# Patient Record
Sex: MALE | Race: WHITE | NOT HISPANIC OR LATINO | Employment: FULL TIME | URBAN - METROPOLITAN AREA
[De-identification: names, ages, dates, MRNs, and addresses within clinical notes are randomized per-mention and may not be internally consistent; named-entity substitution may affect disease eponyms.]

---

## 2018-01-11 DIAGNOSIS — Z00.00 ANNUAL PHYSICAL EXAM: Primary | ICD-10-CM

## 2018-01-22 ENCOUNTER — LAB VISIT (OUTPATIENT)
Dept: LAB | Facility: HOSPITAL | Age: 53
End: 2018-01-22
Attending: INTERNAL MEDICINE
Payer: COMMERCIAL

## 2018-01-22 ENCOUNTER — CLINICAL SUPPORT (OUTPATIENT)
Dept: INTERNAL MEDICINE | Facility: CLINIC | Age: 53
End: 2018-01-22
Payer: COMMERCIAL

## 2018-01-22 ENCOUNTER — CLINICAL SUPPORT (OUTPATIENT)
Dept: CARDIOLOGY | Facility: CLINIC | Age: 53
End: 2018-01-22
Payer: COMMERCIAL

## 2018-01-22 ENCOUNTER — OFFICE VISIT (OUTPATIENT)
Dept: FAMILY MEDICINE | Facility: CLINIC | Age: 53
End: 2018-01-22
Payer: COMMERCIAL

## 2018-01-22 VITALS
TEMPERATURE: 98 F | SYSTOLIC BLOOD PRESSURE: 118 MMHG | DIASTOLIC BLOOD PRESSURE: 72 MMHG | OXYGEN SATURATION: 98 % | BODY MASS INDEX: 35.42 KG/M2 | RESPIRATION RATE: 18 BRPM | HEIGHT: 71 IN | WEIGHT: 253 LBS | HEART RATE: 84 BPM

## 2018-01-22 DIAGNOSIS — R74.01 ELEVATED ALT MEASUREMENT: ICD-10-CM

## 2018-01-22 DIAGNOSIS — Z00.00 ANNUAL PHYSICAL EXAM: Primary | ICD-10-CM

## 2018-01-22 DIAGNOSIS — Z00.00 ROUTINE PHYSICAL EXAMINATION: Primary | ICD-10-CM

## 2018-01-22 DIAGNOSIS — M25.512 ACUTE PAIN OF LEFT SHOULDER: ICD-10-CM

## 2018-01-22 DIAGNOSIS — Z12.11 SCREEN FOR COLON CANCER: ICD-10-CM

## 2018-01-22 DIAGNOSIS — Z00.00 ANNUAL PHYSICAL EXAM: ICD-10-CM

## 2018-01-22 LAB
ALBUMIN SERPL BCP-MCNC: 4 G/DL
ALP SERPL-CCNC: 72 U/L
ALT SERPL W/O P-5'-P-CCNC: 67 U/L
ANION GAP SERPL CALC-SCNC: 7 MMOL/L
AST SERPL-CCNC: 31 U/L
BILIRUB SERPL-MCNC: 0.4 MG/DL
BUN SERPL-MCNC: 15 MG/DL
CALCIUM SERPL-MCNC: 9.1 MG/DL
CHLORIDE SERPL-SCNC: 105 MMOL/L
CHOLEST SERPL-MCNC: 151 MG/DL
CHOLEST/HDLC SERPL: 4.1 {RATIO}
CO2 SERPL-SCNC: 25 MMOL/L
COMPLEXED PSA SERPL-MCNC: 1.2 NG/ML
CREAT SERPL-MCNC: 0.8 MG/DL
ERYTHROCYTE [DISTWIDTH] IN BLOOD BY AUTOMATED COUNT: 14 %
EST. GFR  (AFRICAN AMERICAN): >60 ML/MIN/1.73 M^2
EST. GFR  (NON AFRICAN AMERICAN): >60 ML/MIN/1.73 M^2
GGT SERPL-CCNC: 28 U/L
GLUCOSE SERPL-MCNC: 102 MG/DL
HCT VFR BLD AUTO: 43.1 %
HDLC SERPL-MCNC: 37 MG/DL
HDLC SERPL: 24.5 %
HGB BLD-MCNC: 14.9 G/DL
LDLC SERPL CALC-MCNC: 82.2 MG/DL
MCH RBC QN AUTO: 30.1 PG
MCHC RBC AUTO-ENTMCNC: 34.6 G/DL
MCV RBC AUTO: 87 FL
NONHDLC SERPL-MCNC: 114 MG/DL
PLATELET # BLD AUTO: 224 K/UL
PMV BLD AUTO: 11.6 FL
POTASSIUM SERPL-SCNC: 4.5 MMOL/L
PROT SERPL-MCNC: 7.3 G/DL
RBC # BLD AUTO: 4.95 M/UL
SODIUM SERPL-SCNC: 137 MMOL/L
TRIGL SERPL-MCNC: 159 MG/DL
WBC # BLD AUTO: 6.19 K/UL

## 2018-01-22 PROCEDURE — 82977 ASSAY OF GGT: CPT

## 2018-01-22 PROCEDURE — 99396 PREV VISIT EST AGE 40-64: CPT | Mod: S$GLB,,, | Performed by: INTERNAL MEDICINE

## 2018-01-22 PROCEDURE — 99999 PR PBB SHADOW E&M-EST. PATIENT-LVL IV: CPT | Mod: PBBFAC,,, | Performed by: INTERNAL MEDICINE

## 2018-01-22 PROCEDURE — 36415 COLL VENOUS BLD VENIPUNCTURE: CPT | Mod: PO

## 2018-01-22 PROCEDURE — 85027 COMPLETE CBC AUTOMATED: CPT | Mod: PO

## 2018-01-22 PROCEDURE — 84153 ASSAY OF PSA TOTAL: CPT

## 2018-01-22 PROCEDURE — 99999 PR PBB SHADOW E&M-EST. PATIENT-LVL I: CPT | Mod: PBBFAC,,,

## 2018-01-22 PROCEDURE — 80053 COMPREHEN METABOLIC PANEL: CPT | Mod: PO

## 2018-01-22 PROCEDURE — 93000 ELECTROCARDIOGRAM COMPLETE: CPT | Mod: S$GLB,,, | Performed by: INTERNAL MEDICINE

## 2018-01-22 PROCEDURE — 80061 LIPID PANEL: CPT

## 2018-01-22 NOTE — PROGRESS NOTES
January 22, 2018                                                                                                                                                                                                                                                                                      Lorenzo Cross  [address] [place] [address]                                                                                                                                                                                                                                                                                                RE: Lorenzo Cross                                                        Clinic #:[id number]                                                                                                                                   Dear  Lorenzo Cross,                                                                                                                                           Thank you for allowing me to serve you and perform your Executive Health exam on January 22, 2018.   This letter will serve a brief summary of the history, physical findings, and laboratory/studies performed and recommendations at that time.                                                                                         REASON FOR VISIT: Executive Health Preventive Physical Examination    History reviewed. No pertinent past medical history.    Past Surgical History:   Procedure Laterality Date    HAND SURGERY      LEG SURGERY      RT    THIGH BIOSPY         Family History   Problem Relation Age of Onset    Hypertension Mother     Heart disease Mother     Diabetes Mother     Heart disease Father     Cancer Father        Social History     Social History    Marital status:      Spouse name: YOBANI    Number of children: 4    Years of education: N/A     Occupational History     [employer]     Social History Main Topics    Smoking status: Former Smoker     Quit date: 3/9/2013    Smokeless tobacco: Former User    Alcohol use No    Drug use: No    Sexual activity: Not on file     Other Topics Concern    Not on file     Social History Narrative    No narrative on file       Allergies: Latex, natural rubber    No current outpatient prescriptions on file.     No current facility-administered medications for this visit.        REVIEW OF SYSTEMS:  No recent changes in weight, or complaints of fatigue. No recent changes in vision, or hearing. Denies frequent headaches.No recent changes in voice. No new or changing skin lesions. Denies abnormal bruising or bleeding.  Denies chest pain or sensation of skipped beats. No new onset of shortness of breath, or dyspnea on exertion. Denies abdominal discomfort, constipation, diarrhea,or blood in stool. Denies difficulty with urination.   No recent joint swelling or muscle discomfort. Denies pain or weakness in extremities. No recent loss of balance. Denies problems with sleep or depression.        Remainder of the review of systems without pertinent positives at this time.                                                                              PHYSICAL EXAM:   VITAL SIGNS: /72 (BP Location: Right arm, Patient Position: Sitting)   Pulse 84   Temp 98 °F (36.7 °C) (Oral)   Resp 18   Ht 5' 11" (1.803 m)   Wt 114.8 kg (253 lb)   SpO2 98%   BMI 35.29 kg/m²   GENERAL APPEARANCE:  Well nourished and normally developed,  pleasant 52 y.o. male, in good spirits.  SKIN: Without rashes or overt lesions.  HEENT: Head normacephalic. There was no scleral icterus. Mucous membranes were moist. Dentition. Neck is supple, no thyromegaly, or carotid bruits.  LUNGS: Clear to auscultation bilaterally. Normal respiratory effort.  HEART: Exam reveals regular rate and rhythm. First and second heart sounds normal. No murmurs, rubs or gallops.   ABDOMEN: " Soft, non-tender, non-distended. Exam reveals normal bowl sounds, no masses, no organomegaly and no aortic enlargement.    EXTREMITIES:  Non edematous, both femoral and pedal pulses are normal. No joint stiffness or tenderness. Full range of motion and strength, upper and lower bilaterally.    LAB DATA/STUDIES REVIEWED:  LABS:  Elevated liver enzyme (ALT)  EKG:  Normal      ASSESSMENT/RECOMMENDATIONS :    At this time,  you appear to be in good medical condition.    For your shoulder pain, we sent your to our sports medicine specialist.  For your history of mildly elevated liver enzyme, your work up was unrevealing.  That plus your liver ultrasound being normal in 2016, we can just monitor this.     I have reviewed your cholesterol profile, which is composed of your:   total cholesterol,   LDL- lousy cholesterol that causes plaques,  HDL - healthy cholesterol that removes LDL, and  Triglycerides - when elevated, a risk factor of heart disease and associated with high LDL levels)   Your triglycerides are mildly high.  Eating less processed foods(crackers, potato chips, and cookies) and/or less foods containing high amounts of simple sugars(candy, doughnuts, cakes, milk chocolate or sweets ) will lower your triglyceride levels.  Also, eating foods high in omega 3 fatty acids(fatty fish such as salmon) will help to lower your triglyceride levels.    Continue to work on regular exercise, maintenance of a healthy weight, balanced diet rich in fruits/vegetables and lean protein, and avoidance of unhealthy habits like smoking and excessive alcohol intake.  I look forward to seeing you again next year.    Please contact me should you have any questions or concerns regarding physical findings, or my recommendations.       Sincerely yours,          Clifford Sotelo M.D.  Department of Internal Medicine  [facility]     (0) independent

## 2018-01-30 ENCOUNTER — INITIAL CONSULT (OUTPATIENT)
Dept: PHYSICAL MEDICINE AND REHAB | Facility: CLINIC | Age: 53
End: 2018-01-30
Payer: COMMERCIAL

## 2018-01-30 VITALS
DIASTOLIC BLOOD PRESSURE: 91 MMHG | WEIGHT: 253 LBS | SYSTOLIC BLOOD PRESSURE: 150 MMHG | HEIGHT: 71 IN | HEART RATE: 94 BPM | BODY MASS INDEX: 35.42 KG/M2

## 2018-01-30 DIAGNOSIS — M75.122 COMPLETE RUPTURE OF LEFT ROTATOR CUFF: ICD-10-CM

## 2018-01-30 DIAGNOSIS — G89.29 CHRONIC LEFT SHOULDER PAIN: Primary | ICD-10-CM

## 2018-01-30 DIAGNOSIS — M25.512 CHRONIC LEFT SHOULDER PAIN: Primary | ICD-10-CM

## 2018-01-30 PROCEDURE — 99999 PR PBB SHADOW E&M-EST. PATIENT-LVL III: CPT | Mod: PBBFAC,,, | Performed by: PHYSICAL MEDICINE & REHABILITATION

## 2018-01-30 PROCEDURE — 76881 US COMPL JOINT R-T W/IMG: CPT | Mod: 59,S$GLB,, | Performed by: PHYSICAL MEDICINE & REHABILITATION

## 2018-01-30 PROCEDURE — 99244 OFF/OP CNSLTJ NEW/EST MOD 40: CPT | Mod: 25,S$GLB,, | Performed by: PHYSICAL MEDICINE & REHABILITATION

## 2018-01-30 PROCEDURE — 20611 DRAIN/INJ JOINT/BURSA W/US: CPT | Mod: LT,S$GLB,, | Performed by: PHYSICAL MEDICINE & REHABILITATION

## 2018-01-30 RX ORDER — BETAMETHASONE SODIUM PHOSPHATE AND BETAMETHASONE ACETATE 3; 3 MG/ML; MG/ML
6 INJECTION, SUSPENSION INTRA-ARTICULAR; INTRALESIONAL; INTRAMUSCULAR; SOFT TISSUE
Status: DISCONTINUED | OUTPATIENT
Start: 2018-01-30 | End: 2018-01-30 | Stop reason: HOSPADM

## 2018-01-30 RX ADMIN — BETAMETHASONE SODIUM PHOSPHATE AND BETAMETHASONE ACETATE 6 MG: 3; 3 INJECTION, SUSPENSION INTRA-ARTICULAR; INTRALESIONAL; INTRAMUSCULAR; SOFT TISSUE at 10:01

## 2018-01-30 NOTE — PROCEDURES
Large Joint Aspiration/Injection  Date/Time: 1/30/2018 10:47 AM  Performed by: TATUM SIDHU  Authorized by: TATUM SIDHU     Consent Done?:  Yes (Verbal)  Indications:  Pain  Procedure site marked: Yes    Timeout: Prior to procedure the correct patient, procedure, and site was verified      Location:  Shoulder  Site:  L subacromial bursa  Prep: Patient was prepped and draped in usual sterile fashion    Ultrasonic Guidance for needle placement: Yes  Images are saved and documented.  Needle size:  25 G  Approach: Needle in plane, lateral to medial.  Medications:  6 mg betamethasone acetate-betamethasone sodium phosphate 6 mg/mL  Patient tolerance:  Patient tolerated the procedure well with no immediate complications    Additional Comments: Ultrasound guidance was used for correct needle placement, the images were saved will be uploaded to EMR.

## 2018-01-30 NOTE — LETTER
January 30, 2018      Clifford Sotelo MD  [address] [facility] [address] [place] [address]           [facility]  [address] [facility] [address] [place] [address]  Phone: [phone]  Fax: [phone]          Patient: Lorenzo Cross   MR Number: [id number]   YOB: 1965   Date of Visit: 1/30/2018       Dear Dr. Clifford Sotelo:    Thank you for referring Lorenzo Cross to me for evaluation. Attached you will find relevant portions of my assessment and plan of care.    If you have questions, please do not hesitate to call me. I look forward to following Lorenzo Cross along with you.    Sincerely,    Usama Moore MD    Enclosure  CC:  No Recipients    If you would like to receive this communication electronically, please contact [email or web address] or [phone] to request more information on [employer] access.    For providers and/or their staff who would like to refer a patient to [facility], please contact us through our one-stop-shop provider referral line, [facility], at [phone].    If you feel you have received this communication in error or would no longer like to receive these types of communications, please e-mail [email or web address]

## 2018-01-30 NOTE — PROGRESS NOTES
[facility]    Consulting Provider: Clifford Sotelo MD    CHIEF COMPLAINT:   Chief Complaint   Patient presents with    Shoulder Pain     left shoulder pain     HISTORY OF PRESENT ILLNESS: Lorenzo Cross is a 52 y.o. right-handed male who presents to me for the first time for evaluation of left shoulder pain. He reports pain for several years which came on gradually without injury/trauma. He was recently reaching for something with his left arm when he felt popping and worsening left shoulder pain. He localizes pain to anterior left shoulder. He denies numbness/tingling. Pain is exacerbated with overhead activities and lying on left side at night. Pain is relieved with physical therapy, massage and rest. He attended physical therapy a few years ago which provided some relief. He has never received shoulder injections in the past.  He rates his pain as a 5 on a scale of 1-10.  He expresses some popping of the shoulder.  He denies any numbness or tingling of the left upper.    Review of Systems   Constitutional: Negative for fever.   HENT: Negative for drooling.    Eyes: Negative for discharge.   Respiratory: Negative for choking.    Cardiovascular: Negative for chest pain.   Genitourinary: Negative for flank pain.   Skin: Negative for wound.   Allergic/Immunologic: Negative for immunocompromised state.   Neurological: Negative for tremors and syncope.   Psychiatric/Behavioral: Negative for behavioral problems.     Past Medical History: History reviewed. No pertinent past medical history.    Past Surgical History:   Past Surgical History:   Procedure Laterality Date    HAND SURGERY      LEG SURGERY      RT    THIGH BIOSPY         Family History:   Family History   Problem Relation Age of Onset    Hypertension Mother     Heart disease Mother     Diabetes Mother     Heart disease Father     Cancer Father        Medications:   No current outpatient prescriptions on file prior to visit.  "    No current facility-administered medications on file prior to visit.        Allergies:   Review of patient's allergies indicates:   Allergen Reactions    Latex, natural rubber Rash     With prolonged exposure, he will develop rash - raised, red, itchy       Social History:   Social History     Social History    Marital status:      Spouse name: YOBANI    Number of children: 4    Years of education: N/A     Occupational History     [employer]     Social History Main Topics    Smoking status: Former Smoker     Quit date: 3/9/2013    Smokeless tobacco: Former User    Alcohol use No    Drug use: No    Sexual activity: Not Asked     Other Topics Concern    None     Social History Narrative    None     PHYSICAL EXAMINATION:   General    Vitals:    01/30/18 1005   BP: (!) 150/91   Pulse: 94   Weight: 114.8 kg (253 lb)   Height: 5' 11" (1.803 m)     Constitutional: Oriented to person, place, and time. No apparent distress. Appears well-developed and well-nourished. Pleasant.  HENT:   Head: Normocephalic and atraumatic.   Eyes: Right eye exhibits no discharge. Left eye exhibits no discharge. No scleral icterus.   Pulmonary/Chest: Effort normal. No respiratory distress.   Abdominal: There is no guarding.   Neurological: Alert and oriented to person, place, and time.   Psychiatric: Behavior is normal.     Right Shoulder Exam     Tenderness   The patient is experiencing no tenderness.        Range of Motion   Active Abduction: normal   Passive Abduction: normal   Extension: normal   Forward Flexion: normal   External Rotation: normal     Muscle Strength   Abduction: 5/5   Internal Rotation: 5/5   External Rotation: 5/5   Supraspinatus: 5/5   Subscapularis: 5/5   Biceps: 5/5     Tests   Apprehension: negative  Cross Arm: negative  Drop Arm: negative  Hawkin's test: negative  Impingement: negative  Sulcus: absent    Other   Erythema: absent  Scars: absent  Sensation: normal  Pulse: " present      Left Shoulder Exam     Tenderness   Left shoulder tenderness location: greater tuberosity.    Range of Motion   Active Abduction: 170   Passive Abduction: 170   Extension: 60   External Rotation: 90     Muscle Strength   Abduction: 5/5   Internal Rotation: 5/5   External Rotation: 5/5   Supraspinatus: 4/5   Subscapularis: 5/5   Biceps: 5/5     Tests   Apprehension: negative  Cross Arm: negative  Drop Arm: negative  Hawkin's test: positive  Impingement: positive  Sulcus: absent    Other   Erythema: absent  Scars: absent  Sensation: normal  Pulse: present     Comments:  +Empty can        INSPECTION: There is no swelling, ecchymoses, erythema or gross deformity about the left shoulder.    Imaging  X-ray left shoulder (9/18/16) - There is hypertrophic degenerative change of the left acromioclavicular joint.  No acute fracture, dislocation, or osseous destructive process appreciated radiographically.  No accessory os acromiale ossicle.  There is mild degenerative change of the left shoulder joint.  No rotator cuff calcific tendinitis.  The visualized left chest is clear.    Diagnostic Ultrasound Exam:  FINDINGS: Real time examination was performed. Selected static and dynamic images were obtained, and correlated with prior x-ray and other available imaging.     The left shoulder was examined and findings were as follows: the long head of the biceps tendon was observed to have a normal fibular appearance and was positioned appropriately in the bicipital groove. The biceps tendon did not reveal subluxation on dynamic imaging. There is no visible evidence for coracohumeral impingement. The acromioclavicular joint has some cortical irregularity as well as hypoechoic swelling. There is no evidence for abnormal translation of the acromioclavicular joint on cross-body adduction.     The rotator cuff was examined in detail. The subscapularis is heterogenous in echotexture.  There was a moderate hypoechoic fluid  "collection within the subacromial bursa. The supraspinatus was abnormal with a full-thickness tear at the distal anterior portion of the tendon. There was cortical irregularity of the greater tuberosity present. The infraspinatus was heterogenous in internal echotexture consistent with tendonopathy. The teres minor was not assessed. The subacromial/subdeltoid bursa was thickened and inflated with a moderate degree of hypoechoic fluid.     There does not appear to be any effusion within the posterior glenohumeral recess. The posterosuperior glenoid labrum is normal. The spinoglenoid notch is normal, without a suprascapular ganglion cyst.    Data Reviewed: X-ray, ultrasound    Supportive Actions: Independent visualization of images or test specimens    ASSESSMENT:   1. Chronic left shoulder pain      PLAN:     1. Time was spent reviewing the above diagnosis in depth with Lorenzo hughes, including acute management and rehabilitation.     2.  He has signs, symptoms, and diagnostic ultrasound exam findings all consistent with diagnosis of a full-thickness left rotator cuff tear.  We discussed that surgical revision is the only means for complete repair.  He feels that he is not ready to undergo such procedure and wishes to explore more conservative options.  We discussed injection under ultrasound of corticosteroid to the left shoulder.  He does wish to proceed with this option, see separate procedure note.    3.  I recommended we combine the injection with formal physical therapy.  He should focus on rotator cuff and scapular stabilization and transition to a home exercise program.     4. RTC in 6 weeks if not improved.    This is a consult from Dr. Clifford Sotelo. Please see the "Communications" section of Epic to see how the consulting physician received the report of today's findings and recommendations. If it's an [facility] physician, it will be forwarded to his/her "in basket".    The above note was completed, in part, " with the aid of Dragon dictation software/hardware. Translation errors may be present.

## 2018-04-18 ENCOUNTER — TELEPHONE (OUTPATIENT)
Dept: ENDOSCOPY | Facility: HOSPITAL | Age: 53
End: 2018-04-18

## 2018-04-18 NOTE — TELEPHONE ENCOUNTER
Mr. Cross needs to reschedule please. He wasn't aware his colonoscopy was scheduled for Friday and can't take off of work short notice. Thank-you.

## 2018-04-19 NOTE — TELEPHONE ENCOUNTER
Called pt to reschedule colonoscopy  Pt states he is going to have surgery on his shoulder     Advised pt to call after his shoulder surgery.  When he is able to lay on his shoulder

## 2019-07-03 ENCOUNTER — TELEPHONE (OUTPATIENT)
Dept: FAMILY MEDICINE | Facility: CLINIC | Age: 54
End: 2019-07-03

## 2019-07-03 NOTE — TELEPHONE ENCOUNTER
Called patient from call list  To schedule apt     No answer left message to return call to schedule annual apt.

## 2019-07-03 NOTE — TELEPHONE ENCOUNTER
----- Message from Linda Hook sent at 7/3/2019  3:13 PM CDT -----  Contact: self  Type:  Patient Returning Call    Who Called:  self  Who Left Message for Patient:  Aileen  Does the patient know what this is regarding?:  no  Best Call Back Number:  [phone] (home)   Additional Information:  Patient received a call but is not sure what it is regarding. Thanks!

## 2019-07-24 ENCOUNTER — PATIENT OUTREACH (OUTPATIENT)
Dept: ADMINISTRATIVE | Facility: HOSPITAL | Age: 54
End: 2019-07-24

## 2019-07-24 NOTE — LETTER
July 24, 2019    Lorenzo Cross  [address] [place] [address]             [facility]  [address]  [facility] [address]  Phone: [phone] Dear Mr. Cross:    [facility] is committed to your overall health.  To help you get the most out of each of your visits, we will review your information to make sure you are up to date on all of your recommended tests and/or procedures.      Clifford Sotelo MD  has found that your chart shows you may be due for the following:    Health Maintenance Due   Topic    Colonoscopy        If you have had any of the above done at another facility, please bring the records with you or FAX them to [phone] so that your record at [facility] will be complete.  If you have not had any of these tests or procedures done recently and would like to complete this testing, please call [phone] or send a message through your [facility] portal to your provider's office.    If you have an upcoming scheduled appointment for the above test and/or procedures, please disregard this letter.        If you have any questions or concerns, please don't hesitate to call.    Sincerely,    Marisabel VALENCIA, Panel Care Coordinator, -[phone]   [facility]  [address] [place] [address] [phone] (f)

## 2019-08-07 ENCOUNTER — OFFICE VISIT (OUTPATIENT)
Dept: FAMILY MEDICINE | Facility: CLINIC | Age: 54
End: 2019-08-07
Payer: COMMERCIAL

## 2019-08-07 VITALS
DIASTOLIC BLOOD PRESSURE: 84 MMHG | HEIGHT: 71 IN | WEIGHT: 259.69 LBS | BODY MASS INDEX: 36.35 KG/M2 | HEART RATE: 90 BPM | SYSTOLIC BLOOD PRESSURE: 130 MMHG | OXYGEN SATURATION: 96 %

## 2019-08-07 DIAGNOSIS — Z00.00 ROUTINE PHYSICAL EXAMINATION: Primary | ICD-10-CM

## 2019-08-07 DIAGNOSIS — E66.9 OBESITY (BMI 35.0-39.9 WITHOUT COMORBIDITY): ICD-10-CM

## 2019-08-07 DIAGNOSIS — Z00.00 ANNUAL PHYSICAL EXAM: Primary | ICD-10-CM

## 2019-08-07 PROCEDURE — 99999 PR PBB SHADOW E&M-EST. PATIENT-LVL III: ICD-10-PCS | Mod: PBBFAC,,, | Performed by: INTERNAL MEDICINE

## 2019-08-07 PROCEDURE — 99999 PR PBB SHADOW E&M-EST. PATIENT-LVL III: CPT | Mod: PBBFAC,,, | Performed by: INTERNAL MEDICINE

## 2019-08-07 PROCEDURE — 99396 PREV VISIT EST AGE 40-64: CPT | Mod: S$GLB,,, | Performed by: INTERNAL MEDICINE

## 2019-08-07 PROCEDURE — 99396 PR PREVENTIVE VISIT,EST,40-64: ICD-10-PCS | Mod: S$GLB,,, | Performed by: INTERNAL MEDICINE

## 2019-08-07 RX ORDER — PHENTERMINE HYDROCHLORIDE 37.5 MG/1
37.5 TABLET ORAL
Qty: 30 TABLET | Refills: 0 | Status: SHIPPED | OUTPATIENT
Start: 2019-08-07 | End: 2019-09-03 | Stop reason: SDUPTHER

## 2019-08-07 NOTE — PROGRESS NOTES
August 7, 2019                                                                                                                                                                                                                                                                                      Lorenzo Cross  [address] [place] [address]                                                                                                                                                                                                                                                                                                RE: Lorenzo Cross                                                        Clinic #:[id number]                                                                                                                                   Dear  Lorenzo Cross,                                                                                                                                           Thank you for allowing me to serve you and perform your Executive Health exam on August 7, 2019.   This letter will serve a brief summary of the history, physical findings, and laboratory/studies performed and recommendations at that time.                                                                                         REASON FOR VISIT: Executive Health Preventive Physical Examination    History reviewed. No pertinent past medical history.    Past Surgical History:   Procedure Laterality Date    HAND SURGERY      LEG SURGERY      RT    THIGH BIOSPY         Family History   Problem Relation Age of Onset    Hypertension Mother     Heart disease Mother     Diabetes Mother     Heart disease Father     Cancer Father        Social History     Socioeconomic History    Marital status:      Spouse name: YOBANI    Number of children: 4    Years of education: Not on file    Highest education level:  Not on file   Occupational History     Employer: [employer]   Social Needs    Financial resource strain: Not on file    Food insecurity:     Worry: Not on file     Inability: Not on file    Transportation needs:     Medical: Not on file     Non-medical: Not on file   Tobacco Use    Smoking status: Former Smoker     Last attempt to quit: 3/9/2013     Years since quittin.4    Smokeless tobacco: Former User   Substance and Sexual Activity    Alcohol use: No    Drug use: No    Sexual activity: Not on file   Lifestyle    Physical activity:     Days per week: Not on file     Minutes per session: Not on file    Stress: Not on file   Relationships    Social connections:     Talks on phone: Not on file     Gets together: Not on file     Attends Mandaeism service: Not on file     Active member of club or organization: Not on file     Attends meetings of clubs or organizations: Not on file     Relationship status: Not on file   Other Topics Concern    Not on file   Social History Narrative    Not on file       Allergies: Latex, natural rubber    Current Outpatient Medications   Medication Sig Dispense Refill    phentermine (ADIPEX-P) 37.5 mg tablet Take 1 tablet (37.5 mg total) by mouth before breakfast. 30 tablet 0     No current facility-administered medications for this visit.        REVIEW OF SYSTEMS:  No recent changes in weight, or complaints of fatigue. No recent changes in vision, or hearing. Denies frequent headaches.No recent changes in voice. No new or changing skin lesions. Denies abnormal bruising or bleeding.  Denies chest pain or sensation of skipped beats. No new onset of shortness of breath, or dyspnea on exertion. Denies abdominal discomfort, constipation, diarrhea,or blood in stool. Denies difficulty with urination.   No recent joint swelling or muscle discomfort. Denies pain or weakness in extremities. No recent loss of balance. Denies problems with sleep or depression.       "  Remainder of the review of systems without pertinent positives at this time.                                                                              PHYSICAL EXAM:   VITAL SIGNS: /84 (BP Location: Left arm, Patient Position: Sitting, BP Method: Medium (Manual))   Pulse 90   Ht 5' 11" (1.803 m)   Wt 117.8 kg (259 lb 11.2 oz)   SpO2 96%   BMI 36.22 kg/m²   GENERAL APPEARANCE:  Well nourished and normally developed,  pleasant 54 y.o. male, in good spirits.  SKIN: Without rashes or overt lesions.  HEENT: Head normacephalic. There was no scleral icterus. Mucous membranes were moist. Dentition. Neck is supple, no thyromegaly, or carotid bruits.  LUNGS: Clear to auscultation bilaterally. Normal respiratory effort.  HEART: Exam reveals regular rate and rhythm. First and second heart sounds normal. No murmurs, rubs or gallops.   ABDOMEN: Soft, non-tender, non-distended. Exam reveals normal bowl sounds, no masses, no organomegaly and no aortic enlargement.    EXTREMITIES:  Non edematous, both femoral and pedal pulses are normal. No joint stiffness or tenderness. Full range of motion and strength, upper and lower bilaterally.    LAB DATA/STUDIES REVIEWED:      ASSESSMENT/RECOMMENDATIONS :    At this time,  you appear to be in good medical condition.    For your weight, we are going to try Adipex   Continue to work on regular exercise, maintenance of a healthy weight, balanced diet rich in fruits/vegetables and lean protein, and avoidance of unhealthy habits like smoking and excessive alcohol intake.  I look forward to seeing you again next year.    Please contact me should you have any questions or concerns regarding physical findings, or my recommendations.       Sincerely yours,          Clifford Sotelo M.D.  Department of Internal Medicine  [facility]    "

## 2019-08-23 ENCOUNTER — CLINICAL SUPPORT (OUTPATIENT)
Dept: CARDIOLOGY | Facility: CLINIC | Age: 54
End: 2019-08-23
Payer: COMMERCIAL

## 2019-08-23 ENCOUNTER — CLINICAL SUPPORT (OUTPATIENT)
Dept: INTERNAL MEDICINE | Facility: CLINIC | Age: 54
End: 2019-08-23
Payer: COMMERCIAL

## 2019-08-23 DIAGNOSIS — Z00.00 ANNUAL PHYSICAL EXAM: ICD-10-CM

## 2019-08-23 DIAGNOSIS — Z00.00 ANNUAL PHYSICAL EXAM: Primary | ICD-10-CM

## 2019-08-23 LAB
ALBUMIN SERPL BCP-MCNC: 4.3 G/DL (ref 3.5–5.2)
ALP SERPL-CCNC: 88 U/L (ref 55–135)
ALT SERPL W/O P-5'-P-CCNC: 48 U/L (ref 10–44)
ANION GAP SERPL CALC-SCNC: 9 MMOL/L (ref 8–16)
AST SERPL-CCNC: 32 U/L (ref 10–40)
BILIRUB SERPL-MCNC: 0.5 MG/DL (ref 0.1–1)
BUN SERPL-MCNC: 18 MG/DL (ref 6–20)
CALCIUM SERPL-MCNC: 9.4 MG/DL (ref 8.7–10.5)
CHLORIDE SERPL-SCNC: 103 MMOL/L (ref 95–110)
CHOLEST SERPL-MCNC: 139 MG/DL (ref 120–199)
CHOLEST/HDLC SERPL: 4.1 {RATIO} (ref 2–5)
CO2 SERPL-SCNC: 26 MMOL/L (ref 23–29)
COMPLEXED PSA SERPL-MCNC: 1.7 NG/ML (ref 0–4)
CREAT SERPL-MCNC: 0.9 MG/DL (ref 0.5–1.4)
ERYTHROCYTE [DISTWIDTH] IN BLOOD BY AUTOMATED COUNT: 13.2 % (ref 11.5–14.5)
EST. GFR  (AFRICAN AMERICAN): >60 ML/MIN/1.73 M^2
EST. GFR  (NON AFRICAN AMERICAN): >60 ML/MIN/1.73 M^2
GLUCOSE SERPL-MCNC: 83 MG/DL (ref 70–110)
HCT VFR BLD AUTO: 45.4 % (ref 40–54)
HDLC SERPL-MCNC: 34 MG/DL (ref 40–75)
HDLC SERPL: 24.5 % (ref 20–50)
HGB BLD-MCNC: 15 G/DL (ref 14–18)
LDLC SERPL CALC-MCNC: 83.4 MG/DL (ref 63–159)
MCH RBC QN AUTO: 29.6 PG (ref 27–31)
MCHC RBC AUTO-ENTMCNC: 33 G/DL (ref 32–36)
MCV RBC AUTO: 90 FL (ref 82–98)
NONHDLC SERPL-MCNC: 105 MG/DL
PLATELET # BLD AUTO: 211 K/UL (ref 150–350)
PMV BLD AUTO: 12.5 FL (ref 9.2–12.9)
POTASSIUM SERPL-SCNC: 4.2 MMOL/L (ref 3.5–5.1)
PROT SERPL-MCNC: 7.7 G/DL (ref 6–8.4)
RBC # BLD AUTO: 5.07 M/UL (ref 4.6–6.2)
SODIUM SERPL-SCNC: 138 MMOL/L (ref 136–145)
TRIGL SERPL-MCNC: 108 MG/DL (ref 30–150)
WBC # BLD AUTO: 5.98 K/UL (ref 3.9–12.7)

## 2019-08-23 PROCEDURE — 93005 ELECTROCARDIOGRAM TRACING: CPT | Mod: PBBFAC,PO | Performed by: INTERNAL MEDICINE

## 2019-08-23 PROCEDURE — 84153 ASSAY OF PSA TOTAL: CPT

## 2019-08-23 PROCEDURE — 80053 COMPREHEN METABOLIC PANEL: CPT

## 2019-08-23 PROCEDURE — 80061 LIPID PANEL: CPT

## 2019-08-23 PROCEDURE — 85027 COMPLETE CBC AUTOMATED: CPT

## 2019-08-23 PROCEDURE — 93010 EKG 12-LEAD: ICD-10-PCS | Mod: S$PBB,,, | Performed by: INTERNAL MEDICINE

## 2019-08-23 PROCEDURE — 93010 ELECTROCARDIOGRAM REPORT: CPT | Mod: S$PBB,,, | Performed by: INTERNAL MEDICINE

## 2019-09-03 ENCOUNTER — OFFICE VISIT (OUTPATIENT)
Dept: FAMILY MEDICINE | Facility: CLINIC | Age: 54
End: 2019-09-03
Payer: COMMERCIAL

## 2019-09-03 VITALS
SYSTOLIC BLOOD PRESSURE: 126 MMHG | OXYGEN SATURATION: 95 % | WEIGHT: 251.75 LBS | HEIGHT: 71 IN | HEART RATE: 87 BPM | DIASTOLIC BLOOD PRESSURE: 82 MMHG | BODY MASS INDEX: 35.24 KG/M2

## 2019-09-03 DIAGNOSIS — E66.9 OBESITY (BMI 30.0-34.9): ICD-10-CM

## 2019-09-03 DIAGNOSIS — M76.892 LEFT KNEE TENDONITIS: Primary | ICD-10-CM

## 2019-09-03 DIAGNOSIS — E66.9 OBESITY (BMI 35.0-39.9 WITHOUT COMORBIDITY): ICD-10-CM

## 2019-09-03 DIAGNOSIS — R74.01 ELEVATED ALT MEASUREMENT: ICD-10-CM

## 2019-09-03 PROCEDURE — 99214 OFFICE O/P EST MOD 30 MIN: CPT | Mod: S$GLB,,, | Performed by: INTERNAL MEDICINE

## 2019-09-03 PROCEDURE — 99999 PR PBB SHADOW E&M-EST. PATIENT-LVL III: CPT | Mod: PBBFAC,,, | Performed by: INTERNAL MEDICINE

## 2019-09-03 PROCEDURE — 99999 PR PBB SHADOW E&M-EST. PATIENT-LVL III: ICD-10-PCS | Mod: PBBFAC,,, | Performed by: INTERNAL MEDICINE

## 2019-09-03 PROCEDURE — 99214 PR OFFICE/OUTPT VISIT, EST, LEVL IV, 30-39 MIN: ICD-10-PCS | Mod: S$GLB,,, | Performed by: INTERNAL MEDICINE

## 2019-09-03 RX ORDER — PHENTERMINE HYDROCHLORIDE 37.5 MG/1
37.5 TABLET ORAL
Qty: 30 TABLET | Refills: 0 | Status: SHIPPED | OUTPATIENT
Start: 2019-09-03 | End: 2019-10-03

## 2019-09-03 NOTE — PROGRESS NOTES
Subjective:       Patient ID: Lorenzo Cross is a 54 y.o. male.    Chief Complaint: Results    Complains of right knee pain for 1 month.  Worse after work.  Ice and otc tx help  Obesity - lost 8 lb w Adipex and has life style   Elevated ALT - mild persistant.  Neg ultrasound 2014.  Neg hepatitis panel 2013.     Review of Systems   Constitutional: Negative for appetite change and fever.   HENT: Negative for nosebleeds and trouble swallowing.    Eyes: Negative for discharge and visual disturbance.   Respiratory: Negative for choking and shortness of breath.    Cardiovascular: Negative for chest pain and palpitations.   Gastrointestinal: Negative for abdominal pain, nausea and vomiting.   Musculoskeletal: Positive for arthralgias. Negative for joint swelling.   Skin: Negative for rash and wound.   Neurological: Negative for dizziness and syncope.   Psychiatric/Behavioral: Negative for confusion and dysphoric mood.       Objective:      Vitals:    09/03/19 1733   BP: 126/82   Pulse: 87     Physical Exam   Constitutional: He appears well-nourished.   Eyes: Conjunctivae and EOM are normal.   Neck: Normal range of motion.   Cardiovascular: Normal rate and regular rhythm.   Pulmonary/Chest: Effort normal and breath sounds normal.   Musculoskeletal:   Normal ROM bilateral   Left posterior knee tendon TTP    Neurological: No cranial nerve deficit (grossly intact).   Skin: Skin is warm and dry.   Psychiatric: He has a normal mood and affect.   Alert and orientated   Vitals reviewed.        Assessment:       1. Left knee tendonitis    2. Obesity (BMI 30.0-34.9)    3. Elevated ALT measurement    4. Obesity (BMI 35.0-39.9 without comorbidity)        Plan:       Left knee tendonitis    Obesity (BMI 30.0-34.9)    Elevated ALT measurement  -     Ambulatory Referral to Hepatology    Obesity (BMI 35.0-39.9 without comorbidity)  -     phentermine (ADIPEX-P) 37.5 mg tablet; Take 1 tablet (37.5 mg total) by mouth before breakfast.   Dispense: 30 tablet; Refill: 0            Medication List with Changes/Refills   Changed and/or Refilled Medications    Modified Medication Previous Medication    PHENTERMINE (ADIPEX-P) 37.5 MG TABLET phentermine (ADIPEX-P) 37.5 mg tablet       Take 1 tablet (37.5 mg total) by mouth before breakfast.    Take 1 tablet (37.5 mg total) by mouth before breakfast.     Rest, ice  Refer to hepatology  2/3  Continue current management and monitor.    Counseled on regular exercise, maintenance of a healthy weight, balanced diet rich in fruits/vegetables and lean protein, and avoidance of unhealthy habits like smoking and excessive alcohol intake.   Also, counseled on importance of being compliant with medication, health appointments, diet and exercise.     Follow up in about 4 weeks (around 10/1/2019).

## 2019-09-04 ENCOUNTER — DOCUMENTATION ONLY (OUTPATIENT)
Dept: TRANSPLANT | Facility: CLINIC | Age: 54
End: 2019-09-04

## 2019-09-04 NOTE — LETTER
September 4, 2019    Lorenzo Cross  [address] [place] [id number]      Dear Lorenzo Cross:    Your doctor has referred you to the [facility]. We are sending this letter to remind you to make an appointment with us to complete the referral process.     Please call us at [phone] to schedule an appointment. We look forward to seeing you soon.     If you received a call and have been scheduled, please disregard this letter.       Sincerely,        [facility]   [address] [place] [address] [phone]

## 2019-09-04 NOTE — NURSING
Pt records reviewed.   Pt will be referred to Hepatology.  Elevated ALT measurement  Initial referral received  from the workque.   Referring Provider/diagnosis  Clifford Sotelo MD      Referral letter sent to patient.

## 2019-09-05 ENCOUNTER — TELEPHONE (OUTPATIENT)
Dept: HEPATOLOGY | Facility: CLINIC | Age: 54
End: 2019-09-05

## 2019-09-05 NOTE — TELEPHONE ENCOUNTER
----- Message from Maribel Morrison MA sent at 9/4/2019  3:20 PM CDT -----  Contact: Wife Christina [phone]      ----- Message -----  From: Chanelle Stubbs  Sent: 9/4/2019   1:54 PM  To: Dagoberto Jacobo Staff    Patient has been referred to hepatology by Dr Sotelo.  Please call her to schedule.  Thank you!

## 2019-09-17 ENCOUNTER — PATIENT OUTREACH (OUTPATIENT)
Dept: ADMINISTRATIVE | Facility: HOSPITAL | Age: 54
End: 2019-09-17

## 2019-10-01 ENCOUNTER — OFFICE VISIT (OUTPATIENT)
Dept: FAMILY MEDICINE | Facility: CLINIC | Age: 54
End: 2019-10-01
Payer: COMMERCIAL

## 2019-10-01 VITALS
SYSTOLIC BLOOD PRESSURE: 130 MMHG | DIASTOLIC BLOOD PRESSURE: 88 MMHG | WEIGHT: 248.69 LBS | HEART RATE: 84 BPM | OXYGEN SATURATION: 96 % | HEIGHT: 71 IN | BODY MASS INDEX: 34.81 KG/M2

## 2019-10-01 DIAGNOSIS — E66.9 OBESITY (BMI 30.0-34.9): ICD-10-CM

## 2019-10-01 DIAGNOSIS — M25.521 RIGHT ELBOW PAIN: Primary | ICD-10-CM

## 2019-10-01 DIAGNOSIS — M76.892 LEFT KNEE TENDONITIS: ICD-10-CM

## 2019-10-01 PROCEDURE — 99999 PR PBB SHADOW E&M-EST. PATIENT-LVL III: CPT | Mod: PBBFAC,,, | Performed by: INTERNAL MEDICINE

## 2019-10-01 PROCEDURE — 99214 PR OFFICE/OUTPT VISIT, EST, LEVL IV, 30-39 MIN: ICD-10-PCS | Mod: S$GLB,,, | Performed by: INTERNAL MEDICINE

## 2019-10-01 PROCEDURE — 99214 OFFICE O/P EST MOD 30 MIN: CPT | Mod: S$GLB,,, | Performed by: INTERNAL MEDICINE

## 2019-10-01 PROCEDURE — 99999 PR PBB SHADOW E&M-EST. PATIENT-LVL III: ICD-10-PCS | Mod: PBBFAC,,, | Performed by: INTERNAL MEDICINE

## 2019-10-01 RX ORDER — PHENTERMINE HYDROCHLORIDE 37.5 MG/1
37.5 TABLET ORAL
Qty: 30 TABLET | Refills: 0 | Status: SHIPPED | OUTPATIENT
Start: 2019-10-01 | End: 2019-10-31

## 2019-10-01 NOTE — PROGRESS NOTES
Subjective:       Patient ID: Lorenzo Cross is a 54 y.o. male.    Chief Complaint: Elbow Pain    Complains of right knee pain for 1 month.  Worse after work.  Ice and otc tx help; has not done home PT yet  Complains of right elbow pain after hitting it.   Obesity - lost 3 lb w Adipex and has life style   Elevated ALT - mild persistant.  Neg ultrasound 2014.  Neg hepatitis panel 2013.     Review of Systems   Constitutional: Negative for appetite change and fever.   HENT: Negative for nosebleeds and trouble swallowing.    Eyes: Negative for discharge and visual disturbance.   Respiratory: Negative for choking and shortness of breath.    Cardiovascular: Negative for chest pain and palpitations.   Gastrointestinal: Negative for abdominal pain, nausea and vomiting.   Musculoskeletal: Positive for arthralgias. Negative for joint swelling.   Skin: Negative for rash and wound.   Neurological: Negative for dizziness and syncope.   Psychiatric/Behavioral: Negative for confusion and dysphoric mood.       Objective:      Vitals:    10/01/19 1755   BP: 130/88   Pulse: 84     Physical Exam   Constitutional: He appears well-nourished.   Eyes: Conjunctivae and EOM are normal.   Neck: Normal range of motion.   Cardiovascular: Normal rate and regular rhythm.   Pulmonary/Chest: Effort normal and breath sounds normal.   Musculoskeletal:   Normal ROM bilateral   Right elbow mildly TTP; no swelling or ecchymosis  Right knee calf tendon + TTP    Neurological: No cranial nerve deficit (grossly intact).   Skin: Skin is warm and dry.   Psychiatric: He has a normal mood and affect.   Alert and orientated   Vitals reviewed.        Assessment:       1. Right elbow pain    2. Left knee tendonitis    3. Obesity (BMI 30.0-34.9)        Plan:       Right elbow pain    Left knee tendonitis    Obesity (BMI 30.0-34.9)    Other orders  -     phentermine (ADIPEX-P) 37.5 mg tablet; Take 1 tablet (37.5 mg total) by mouth before breakfast.   Dispense: 30 tablet; Refill: 0            Medication List with Changes/Refills   New Medications    PHENTERMINE (ADIPEX-P) 37.5 MG TABLET    Take 1 tablet (37.5 mg total) by mouth before breakfast.   Current Medications    PHENTERMINE (ADIPEX-P) 37.5 MG TABLET    Take 1 tablet (37.5 mg total) by mouth before breakfast.     3/3  [facility]  Home PT  Continue current management and monitor.    Counseled on regular exercise, maintenance of a healthy weight, balanced diet rich in fruits/vegetables and lean protein, and avoidance of unhealthy habits like smoking and excessive alcohol intake.   Also, counseled on importance of being compliant with medication, health appointments, diet and exercise.     Follow up in about 10 months (around 8/10/2020).

## 2019-10-16 ENCOUNTER — PATIENT OUTREACH (OUTPATIENT)
Dept: ADMINISTRATIVE | Facility: OTHER | Age: 54
End: 2019-10-16

## 2019-10-21 ENCOUNTER — PROCEDURE VISIT (OUTPATIENT)
Dept: HEPATOLOGY | Facility: CLINIC | Age: 54
End: 2019-10-21
Attending: NURSE PRACTITIONER
Payer: COMMERCIAL

## 2019-10-21 ENCOUNTER — OFFICE VISIT (OUTPATIENT)
Dept: HEPATOLOGY | Facility: CLINIC | Age: 54
End: 2019-10-21
Payer: COMMERCIAL

## 2019-10-21 VITALS
DIASTOLIC BLOOD PRESSURE: 89 MMHG | RESPIRATION RATE: 16 BRPM | HEIGHT: 71 IN | TEMPERATURE: 97 F | HEART RATE: 86 BPM | WEIGHT: 246.94 LBS | SYSTOLIC BLOOD PRESSURE: 145 MMHG | BODY MASS INDEX: 34.57 KG/M2

## 2019-10-21 DIAGNOSIS — K76.0 FATTY LIVER: ICD-10-CM

## 2019-10-21 DIAGNOSIS — R74.8 ELEVATED LIVER ENZYMES: ICD-10-CM

## 2019-10-21 DIAGNOSIS — R74.8 ELEVATED LIVER ENZYMES: Primary | ICD-10-CM

## 2019-10-21 PROCEDURE — 99204 PR OFFICE/OUTPT VISIT, NEW, LEVL IV, 45-59 MIN: ICD-10-PCS | Mod: S$GLB,,, | Performed by: NURSE PRACTITIONER

## 2019-10-21 PROCEDURE — 99999 PR PBB SHADOW E&M-EST. PATIENT-LVL IV: CPT | Mod: PBBFAC,,, | Performed by: NURSE PRACTITIONER

## 2019-10-21 PROCEDURE — 91200 PR LIVER ELASTOGRAPHY W/OUT IMAG W/INTERP & REPORT: ICD-10-PCS | Mod: S$GLB,,, | Performed by: NURSE PRACTITIONER

## 2019-10-21 PROCEDURE — 99999 PR PBB SHADOW E&M-EST. PATIENT-LVL IV: ICD-10-PCS | Mod: PBBFAC,,, | Performed by: NURSE PRACTITIONER

## 2019-10-21 PROCEDURE — 91200 LIVER ELASTOGRAPHY: CPT | Mod: S$GLB,,, | Performed by: NURSE PRACTITIONER

## 2019-10-21 PROCEDURE — 99204 OFFICE O/P NEW MOD 45 MIN: CPT | Mod: S$GLB,,, | Performed by: NURSE PRACTITIONER

## 2019-10-21 NOTE — PROGRESS NOTES
I have personally performed a face to face diagnostic evaluation on this patient. I have reviewed and agree with today's findings and the care plan outlined by Saumya Mcdonough NP      My findings are as follows:  Patient presents with mild isolated ALT elevation since 2013.  40-60. Other LFTs normal, viral serologies neg. Lipid panel normal.      Has risk factors for fatty liver, BMI 34 (higher previously). Consumed alcohol until 6 yrs ago, abstinent since.      U/s 2016: normal liver. Spleen not examined.        Recommend: low carb diet, regular exercise.     he will return to Saumya Mcdonough NP for follow-up.

## 2019-10-21 NOTE — PROGRESS NOTES
[facility] - New Patient    Referring Provider: Dr. Clifford Sotelo    CHIEF COMPLAINT: Elevated liver enzymes    HPI: This is a 54 y.o. White male referred for evaluation of elevated liver enzymes.    Review of labs:  - ALT mildly elevated since 2013 (40-60)   - ALP normal  - Synthetic liver function normal  - Platelets normal    Workup thus far:  Acute viral hepatitis panel negative    Had abd US in 2016 with normal liver. No comment on spleen size. No recent abdominal imaging.     He does have risk factors for fatty liver including:   · Obesity/overweight -- Body mass index is 34.44 kg/m². On adipex for weight loss. Has lost ~15 lb.                         · Dyslipidemia -- no                               · Insulin resistance / diabetes -- no           · Hypertension -- no  · Alcohol use -- 6 years abstinent since March. Prior to that drank more heavily (~6 pack day)  · Family history -- mother and grandmother with DM; parents with heart disease     No concerning medications (Rx, OTC) or herbal supplements.  No recent changes in meds.  Denies excessive Tylenol / NSAID use.    No family history of liver disease.     He feels well, no complaints. Denies symptoms of hepatic decompensation including jaundice, ascites, cognitive problems to suggest hepatic encephalopathy, or GI bleeding.          Review of patient's allergies indicates:   Allergen Reactions    Latex, natural rubber Rash     With prolonged exposure, he will develop rash - raised, red, itchy        Current Outpatient Medications on File Prior to Visit   Medication Sig Dispense Refill    phentermine (ADIPEX-P) 37.5 mg tablet Take 1 tablet (37.5 mg total) by mouth before breakfast. 30 tablet 0     No current facility-administered medications on file prior to visit.        PMHX:  has no past medical history on file.    PSHX:  has a past surgical history that includes Hand surgery; THIGH BIOSPY; and Leg Surgery.    FAMILY HISTORY:   Family  History   Problem Relation Age of Onset    Hypertension Mother     Heart disease Mother     Diabetes Mother     Heart disease Father     Cancer Father     Cirrhosis Neg Hx        SOCIAL HISTORY:   Social History     Tobacco Use   Smoking Status Former Smoker    Last attempt to quit: 3/9/2013    Years since quittin.6   Smokeless Tobacco Former User       Social History     Substance and Sexual Activity   Alcohol Use No    Frequency: Never    Comment: none in 6 years       Social History     Substance and Sexual Activity   Drug Use No         Review of Systems   Constitutional: Negative for appetite change, chills, fatigue, fever and unexpected weight change.   Eyes: Negative for visual disturbance.   Respiratory: Negative for cough and shortness of breath.    Cardiovascular: Negative for chest pain, palpitations and leg swelling.   Gastrointestinal: Negative for abdominal distention, abdominal pain, blood in stool, constipation, diarrhea, nausea and vomiting. No change in stool color.  Genitourinary: Negative for dysuria. Denies dark urine. Reports microscopic hematuria (on testing with PCP)  Musculoskeletal: Negative for arthralgias, gait problem, joint swelling and myalgias.   Skin: Negative for color change, rash and wound. Denies itching.   Neurological: Negative for dizziness, tremors, speech difficulty, and headaches.   Hematological: Does not bruise/bleed easily.   Psychiatric/Behavioral: Negative for confusion, decreased concentration and sleep disturbance. Denies memory loss. Denies depression. The patient is not nervous/anxious.       Physical Exam   Constitutional: Well-nourished. No distress. Alert and oriented to person, place, and time.  Eyes: No scleral icterus.   Cardiovascular: Normal rate, regular rhythm and normal heart sounds. No murmur heard.  Pulmonary/Chest: Respiratory effort and breath sounds normal. No respiratory distress.   Abdominal: Soft, non-tender. No distension; no  "ascites appreciated. There is no palpable hepatosplenomegaly. No hernia or mass.   Musculoskeletal: No edema.   Neurological: No tremor. Coordination and gait normal. No asterixis.    Skin: Skin is warm and dry. No rash or erythema. No jaundice. No telangiectasias or palmar erythema noted.  Psychiatric: Normal mood and affect. Speech, behavior, and thought content normal. No depression or anxiety noted.       BP (!) 145/89 (BP Location: Left arm, Patient Position: Sitting, BP Method: Medium (Automatic))   Pulse 86   Temp 97 °F (36.1 °C) (Oral)   Resp 16   Ht 5' 11" (1.803 m)   Wt 112 kg (246 lb 14.6 oz)   BMI 34.44 kg/m²       LABS:  Lab Results   Component Value Date    WBC 5.98 08/23/2019    HGB 15.0 08/23/2019    HCT 45.4 08/23/2019     08/23/2019     08/23/2019    K 4.2 08/23/2019    CREATININE 0.9 08/23/2019    ALT 48 (H) 08/23/2019    AST 32 08/23/2019    ALKPHOS 88 08/23/2019    BILITOT 0.5 08/23/2019    ALBUMIN 4.3 08/23/2019    CHOL 139 08/23/2019    TRIG 108 08/23/2019    LDLCALC 83.4 08/23/2019       No results found for: HEPAIGG    Hepatitis B Surface Ag   Date Value Ref Range Status   09/26/2013 Negative  Final     No results found for: HEPBCAB  No results found for: HEPBSAB  Hepatitis C Ab   Date Value Ref Range Status   09/26/2013 Negative  Final     Immunization History   Administered Date(s) Administered    Influenza - Quadrivalent 11/04/2014, 12/07/2015    Tdap 07/09/2013          DIAGNOSTIC STUDIES:  ABD. U/S   Results for orders placed during the hospital encounter of 10/07/16   US Abdomen Limited    Narrative The liver and pancreas are normal in size and appearance.  The gallbladder and bile ducts are normal.  Maximum common duct diameter is 3.5 mm.  Doppler of the main portal vein confirms a normal waveform and direction of flow.  The right kidney normal in size and appearance.  The right kidney measures 11.9 cm in length.  No ascites is identified.    Impression    Normal " right upper quadrant abdomen ultrasound.      Electronically signed by: Hossein Palacios  Date:     10/07/16  Time:    09:59          ASSESSMENT / PLAN:  54 y.o. White male with:      1. Elevated ALT - May be due to fatty liver    -- Serologic workup to r/o other disorders that could be causing elevated liver enzymes  -- Fibroscan today for fibrosis and steatosis staging  -- Abdominal US  -- Discussed that if determined to have fatty liver, the only management includes weight loss with diet/exercise and controlling risk factors (blood pressure, cholesterol, blood sugar)       Orders Placed This Encounter   Procedures    US Elastography Liver    US Abdomen Complete    ALPHA-1-ANTITRYPSIN    RADHA Screen w/Reflex    Antimitochondrial antibody    Anti-smooth muscle antibody    IgG    Ceruloplasmin    Ferritin    Iron and TIBC    Hepatic function panel    Hepatitis A antibody, IgG    Hepatitis C antibody    Hepatitis B surface antigen    Hepatitis B surface antibody    Hepatitis B core antibody, total         Return to clinic pending above results.         Thank you for allowing me to participate in the care of Lorenzo Mcdonough, FNP-C  Hepatology and Liver Transplant     Patient was seen in clinic with Dr. Maldonado ; Case discussed, plan reviewed.       Addendum: Fibroscan kPa = 5.1, F0-F1, no-mild fibrosis. CAP = 398, S3 or >67% steatosis. Results reviewed with patient in clinic. Anticipate liver enzyme normalization as he continues to have success with weight loss. Will await serologic workup. Likely plan for follow-up in 1 year.

## 2019-10-21 NOTE — PROCEDURES
Fibroscan Procedure     Name: Lorenzo Cross  Date of Procedure : 10/21/2019   :: Saumya Mcdonough NP  Diagnosis: elevated liver enzymes    Probe: XL    Findings  Median liver stiffness score: 5.1 KPa  CAP readin dB/m    IQR/med: 20 %    Interpretation  Fibrosis interpretation is based on medial liver stiffness - Kilopascal (kPa).     Fibrosis stage: F 0-1     Steatosis interpretation is based on controlled attenuation parameter - (dB/m).    Steatosis grade: S3

## 2019-10-21 NOTE — LETTER
October 21, 2019      Clifford Sotelo MD  [address] [facility] [address] [place] [address]           [facility] - Hepatology  [address]  [facility] [address]  Phone: [phone]  Fax: [phone]          Patient: Lorenzo Cross   MR Number: [id number]   YOB: 1965   Date of Visit: 10/21/2019       Dear Dr. Clifford Sotelo:    Thank you for referring Lorenzo Cross to me for evaluation. Attached you will find relevant portions of my assessment and plan of care.    If you have questions, please do not hesitate to call me. I look forward to following Lorenzo Cross along with you.    Sincerely,    Saumya Mcdonough, NIDIA    Enclosure  CC:  No Recipients    If you would like to receive this communication electronically, please contact [email or web address] or [phone] to request more information on [employer] access.    For providers and/or their staff who would like to refer a patient to [facility], please contact us through our one-stop-shop provider referral line, [facility], at [phone].    If you feel you have received this communication in error or would no longer like to receive these types of communications, please e-mail [email or web address]

## 2019-10-23 ENCOUNTER — TELEPHONE (OUTPATIENT)
Dept: HEPATOLOGY | Facility: CLINIC | Age: 54
End: 2019-10-23

## 2019-10-23 DIAGNOSIS — R74.8 ELEVATED LIVER ENZYMES: Primary | ICD-10-CM

## 2019-10-23 NOTE — TELEPHONE ENCOUNTER
----- Message from Saumya Mcdonough NP sent at 10/23/2019  2:42 PM CDT -----  Please inform patient- Lab workup was negative for causes of liver disease. No evidence of genetic or autoimmune liver disease, no hepatitis B or C, no iron/copper overload.   Liver enzyme (ALT) remains mildly elevated. Continue to work on weight loss for fatty liver. Liver enzyme should improve as he has success with this.  Labs also show no immunity/protection to hepatitis A or B. I recommend vaccination to protect the liver from this (3 shots over 6 months). Please let me know if he would like the vaccines so I can order.     Keep ultrasound apt as scheduled.   Recommend repeat labs in 6 months to monitor the liver enzymes, please schedule.

## 2019-11-01 ENCOUNTER — HOSPITAL ENCOUNTER (OUTPATIENT)
Dept: RADIOLOGY | Facility: HOSPITAL | Age: 54
Discharge: HOME | End: 2019-11-01
Attending: NURSE PRACTITIONER
Payer: COMMERCIAL

## 2019-11-01 DIAGNOSIS — R74.8 ELEVATED LIVER ENZYMES: ICD-10-CM

## 2019-11-01 PROCEDURE — 76700 US EXAM ABDOM COMPLETE: CPT | Mod: 26,,, | Performed by: RADIOLOGY

## 2019-11-01 PROCEDURE — 76700 US ABDOMEN COMPLETE: ICD-10-PCS | Mod: 26,,, | Performed by: RADIOLOGY

## 2019-11-01 PROCEDURE — 76700 US EXAM ABDOM COMPLETE: CPT | Mod: TC,PO

## 2020-02-18 ENCOUNTER — TELEPHONE (OUTPATIENT)
Dept: FAMILY MEDICINE | Facility: CLINIC | Age: 55
End: 2020-02-18

## 2020-02-18 NOTE — TELEPHONE ENCOUNTER
Call placed to patient, who would like to restart Adipex, but his last visit states to follow up in August. And he believes he needs an appointment before then. If so, please advise on appropriate slot to use?

## 2020-02-18 NOTE — TELEPHONE ENCOUNTER
----- Message from Jeovany Pierce sent at 2/18/2020  9:29 AM CST -----  Contact: Patient  Patient called in and wanted to speak with the office regarding an appointment and would like a call back from the office.  can be reached at    [phone]

## 2020-03-03 ENCOUNTER — OFFICE VISIT (OUTPATIENT)
Dept: FAMILY MEDICINE | Facility: CLINIC | Age: 55
End: 2020-03-03
Payer: COMMERCIAL

## 2020-03-03 VITALS
OXYGEN SATURATION: 96 % | WEIGHT: 254.88 LBS | HEIGHT: 71 IN | SYSTOLIC BLOOD PRESSURE: 132 MMHG | DIASTOLIC BLOOD PRESSURE: 88 MMHG | HEART RATE: 87 BPM | BODY MASS INDEX: 35.68 KG/M2

## 2020-03-03 DIAGNOSIS — E66.9 OBESITY (BMI 35.0-39.9 WITHOUT COMORBIDITY): ICD-10-CM

## 2020-03-03 DIAGNOSIS — R09.82 PND (POST-NASAL DRIP): Primary | ICD-10-CM

## 2020-03-03 PROCEDURE — 99999 PR PBB SHADOW E&M-EST. PATIENT-LVL III: CPT | Mod: PBBFAC,,, | Performed by: INTERNAL MEDICINE

## 2020-03-03 PROCEDURE — 99999 PR PBB SHADOW E&M-EST. PATIENT-LVL III: ICD-10-PCS | Mod: PBBFAC,,, | Performed by: INTERNAL MEDICINE

## 2020-03-03 PROCEDURE — 99213 PR OFFICE/OUTPT VISIT, EST, LEVL III, 20-29 MIN: ICD-10-PCS | Mod: S$GLB,,, | Performed by: INTERNAL MEDICINE

## 2020-03-03 PROCEDURE — 99213 OFFICE O/P EST LOW 20 MIN: CPT | Mod: S$GLB,,, | Performed by: INTERNAL MEDICINE

## 2020-03-03 RX ORDER — PHENTERMINE HYDROCHLORIDE 37.5 MG/1
37.5 TABLET ORAL
Qty: 30 TABLET | Refills: 0 | Status: SHIPPED | OUTPATIENT
Start: 2020-03-03 | End: 2020-03-31 | Stop reason: SDUPTHER

## 2020-03-03 RX ORDER — FLUTICASONE PROPIONATE 50 MCG
2 SPRAY, SUSPENSION (ML) NASAL DAILY
Qty: 16 G | Refills: 11 | Status: SHIPPED | OUTPATIENT
Start: 2020-03-03 | End: 2021-03-17 | Stop reason: SDUPTHER

## 2020-03-03 RX ORDER — AZELASTINE 1 MG/ML
SPRAY, METERED NASAL
COMMUNITY
Start: 2020-01-20 | End: 2021-03-17

## 2020-03-04 NOTE — PROGRESS NOTES
Subjective:       Patient ID: Lorenzo Cross is a 54 y.o. male.    Chief Complaint: Allergies    Complains of uncontrolled allergies.  Only takes benadryl occasionally  Obesity - would like help to lose weight     Review of Systems   Constitutional: Negative for appetite change and fever.   HENT: Positive for postnasal drip and rhinorrhea. Negative for nosebleeds and trouble swallowing.    Eyes: Negative for discharge and visual disturbance.   Respiratory: Negative for choking and shortness of breath.    Cardiovascular: Negative for chest pain and palpitations.   Gastrointestinal: Negative for abdominal pain, nausea and vomiting.   Musculoskeletal: Negative for arthralgias and joint swelling.   Skin: Negative for rash and wound.   Neurological: Negative for dizziness and syncope.   Psychiatric/Behavioral: Negative for confusion and dysphoric mood.       Objective:      Vitals:    03/03/20 1801   BP: 132/88   Pulse: 87     Physical Exam   Constitutional: He appears well-nourished.   Eyes: Conjunctivae and EOM are normal.   Neck: Normal range of motion.   Cardiovascular: Normal rate and regular rhythm.   Pulmonary/Chest: Effort normal and breath sounds normal.   Musculoskeletal:   Normal ROM bilateral    Neurological: No cranial nerve deficit (grossly intact).   Skin: Skin is warm and dry.   Psychiatric: He has a normal mood and affect.   Alert and orientated   Vitals reviewed.        Assessment:       1. PND (post-nasal drip)    2. Obesity (BMI 35.0-39.9 without comorbidity)        Plan:       PND (post-nasal drip)  -     fluticasone propionate (FLONASE) 50 mcg/actuation nasal spray; 2 sprays (100 mcg total) by Each Nostril route once daily.  Dispense: 16 g; Refill: 11    Obesity (BMI 35.0-39.9 without comorbidity)  -     phentermine (ADIPEX-P) 37.5 mg tablet; Take 1 tablet (37.5 mg total) by mouth before breakfast.  Dispense: 30 tablet; Refill: 0            Medication List with Changes/Refills   New Medications     FLUTICASONE PROPIONATE (FLONASE) 50 MCG/ACTUATION NASAL SPRAY    2 sprays (100 mcg total) by Each Nostril route once daily.    PHENTERMINE (ADIPEX-P) 37.5 MG TABLET    Take 1 tablet (37.5 mg total) by mouth before breakfast.   Current Medications    AZELASTINE (ASTELIN) 137 MCG (0.1 %) NASAL SPRAY           Continue current management and monitor.    Counseled on regular exercise, maintenance of a healthy weight, balanced diet rich in fruits/vegetables and lean protein, and avoidance of unhealthy habits like smoking and excessive alcohol intake.   Also, counseled on importance of being compliant with medication, health appointments, diet and exercise.     Follow up in about 4 weeks (around 3/31/2020).

## 2020-03-04 NOTE — PATIENT INSTRUCTIONS
Patient advised to use:  - intranasal steroid - flonase   - Over the counter 24h Allegra (no drowsiness); generic ok.   - Benadryl at night

## 2020-03-27 ENCOUNTER — PATIENT OUTREACH (OUTPATIENT)
Dept: ADMINISTRATIVE | Facility: HOSPITAL | Age: 55
End: 2020-03-27

## 2020-03-27 NOTE — PROGRESS NOTES
Chart review completed 03/27/2020.  Care Everywhere updates requested and reviewed.  Immunizations reconciled. Media reviewed.     Health Maintenance Due   Topic Date Due    HIV Screening  04/28/1982    Pneumococcal Vaccine (Medium Risk) (1 of 1 - PPSV23) 04/28/1986    Shingles Vaccine (1 of 2) 04/28/2017    Colonoscopy  04/28/2017    Influenza Vaccine (1) 09/01/2019

## 2020-03-31 ENCOUNTER — OFFICE VISIT (OUTPATIENT)
Dept: FAMILY MEDICINE | Facility: CLINIC | Age: 55
End: 2020-03-31
Payer: COMMERCIAL

## 2020-03-31 DIAGNOSIS — E66.9 OBESITY (BMI 35.0-39.9 WITHOUT COMORBIDITY): ICD-10-CM

## 2020-03-31 PROCEDURE — 99213 OFFICE O/P EST LOW 20 MIN: CPT | Mod: 95,,, | Performed by: FAMILY MEDICINE

## 2020-03-31 PROCEDURE — 99213 PR OFFICE/OUTPT VISIT, EST, LEVL III, 20-29 MIN: ICD-10-PCS | Mod: 95,,, | Performed by: FAMILY MEDICINE

## 2020-03-31 RX ORDER — PHENTERMINE HYDROCHLORIDE 37.5 MG/1
37.5 TABLET ORAL
Qty: 30 TABLET | Refills: 0 | Status: SHIPPED | OUTPATIENT
Start: 2020-03-31 | End: 2020-04-30

## 2020-03-31 NOTE — PROGRESS NOTES
Subjective:       Patient ID: Lorenzo Cross is a 54 y.o. male.    Chief Complaint: No chief complaint on file.    HPI     The patient location is: home  The chief complaint leading to consultation is: obesity  Visit type: Virtual visit with synchronous audio and video  Total time spent with patient: 15 minutes     Each patient to whom he or she provides medical services by telemedicine is:  (1) informed of the relationship between the physician and patient and the respective role of any other health care provider with respect to management of the patient; and (2) notified that he or she may decline to receive medical services by telemedicine and may withdraw from such care at any time.      Started taking adipex 1 month ago. Has lost approx 10 lbs over the past 1 month. Weight at home is 244 lbs.     Review of Systems    Objective:      Physical Exam    Assessment:       1. Obesity (BMI 35.0-39.9 without comorbidity)        Plan:       Obesity (BMI 35.0-39.9 without comorbidity)  -     phentermine (ADIPEX-P) 37.5 mg tablet; Take 1 tablet (37.5 mg total) by mouth before breakfast.  Dispense: 30 tablet; Refill: 0            Plan:  Refill adipex  Video visit in 1 month    Medication List with Changes/Refills   Current Medications    AZELASTINE (ASTELIN) 137 MCG (0.1 %) NASAL SPRAY        FLUTICASONE PROPIONATE (FLONASE) 50 MCG/ACTUATION NASAL SPRAY    2 sprays (100 mcg total) by Each Nostril route once daily.   Changed and/or Refilled Medications    Modified Medication Previous Medication    PHENTERMINE (ADIPEX-P) 37.5 MG TABLET phentermine (ADIPEX-P) 37.5 mg tablet       Take 1 tablet (37.5 mg total) by mouth before breakfast.    Take 1 tablet (37.5 mg total) by mouth before breakfast.

## 2020-04-30 DIAGNOSIS — Z12.11 COLON CANCER SCREENING: ICD-10-CM

## 2020-07-01 ENCOUNTER — LAB VISIT (OUTPATIENT)
Dept: LAB | Facility: HOSPITAL | Age: 55
End: 2020-07-01
Attending: NURSE PRACTITIONER
Payer: COMMERCIAL

## 2020-07-01 DIAGNOSIS — R74.8 ELEVATED LIVER ENZYMES: ICD-10-CM

## 2020-07-01 LAB
ALBUMIN SERPL BCP-MCNC: 4 G/DL (ref 3.5–5.2)
ALP SERPL-CCNC: 82 U/L (ref 55–135)
ALT SERPL W/O P-5'-P-CCNC: 50 U/L (ref 10–44)
AST SERPL-CCNC: 31 U/L (ref 10–40)
BILIRUB DIRECT SERPL-MCNC: 0.2 MG/DL (ref 0.1–0.3)
BILIRUB SERPL-MCNC: 0.5 MG/DL (ref 0.1–1)
PROT SERPL-MCNC: 7.6 G/DL (ref 6–8.4)

## 2020-07-01 PROCEDURE — 36415 COLL VENOUS BLD VENIPUNCTURE: CPT | Mod: PO

## 2020-07-01 PROCEDURE — 80076 HEPATIC FUNCTION PANEL: CPT

## 2020-07-06 ENCOUNTER — TELEPHONE (OUTPATIENT)
Dept: HEPATOLOGY | Facility: CLINIC | Age: 55
End: 2020-07-06

## 2020-07-06 DIAGNOSIS — R74.8 ELEVATED LIVER ENZYMES: Primary | ICD-10-CM

## 2020-07-06 NOTE — TELEPHONE ENCOUNTER
----- Message from Saumya Mcdonough NP sent at 7/6/2020  1:56 PM CDT -----  Please notify patient:  Liver enzymes remain mildly elevated but stable.   Recommend continuing weight loss efforts and maintaining good control of blood pressure, cholesterol, and blood sugar for management of fatty liver.     Please schedule hepatic panel and f/u visit in 6 months. Thanks!

## 2020-07-21 DIAGNOSIS — Z00.00 ANNUAL PHYSICAL EXAM: Primary | ICD-10-CM

## 2020-07-23 ENCOUNTER — PATIENT OUTREACH (OUTPATIENT)
Dept: ADMINISTRATIVE | Facility: HOSPITAL | Age: 55
End: 2020-07-23

## 2020-08-03 ENCOUNTER — OFFICE VISIT (OUTPATIENT)
Dept: FAMILY MEDICINE | Facility: CLINIC | Age: 55
End: 2020-08-03
Payer: COMMERCIAL

## 2020-08-03 VITALS
BODY MASS INDEX: 34.68 KG/M2 | SYSTOLIC BLOOD PRESSURE: 152 MMHG | OXYGEN SATURATION: 98 % | WEIGHT: 248.69 LBS | DIASTOLIC BLOOD PRESSURE: 90 MMHG | TEMPERATURE: 99 F | HEART RATE: 86 BPM

## 2020-08-03 DIAGNOSIS — G44.209 TENSION HEADACHE: ICD-10-CM

## 2020-08-03 DIAGNOSIS — I10 ESSENTIAL HYPERTENSION: Primary | ICD-10-CM

## 2020-08-03 PROCEDURE — 99214 PR OFFICE/OUTPT VISIT, EST, LEVL IV, 30-39 MIN: ICD-10-PCS | Mod: S$GLB,,, | Performed by: INTERNAL MEDICINE

## 2020-08-03 PROCEDURE — 99999 PR PBB SHADOW E&M-EST. PATIENT-LVL III: ICD-10-PCS | Mod: PBBFAC,,, | Performed by: INTERNAL MEDICINE

## 2020-08-03 PROCEDURE — 99214 OFFICE O/P EST MOD 30 MIN: CPT | Mod: S$GLB,,, | Performed by: INTERNAL MEDICINE

## 2020-08-03 PROCEDURE — 99999 PR PBB SHADOW E&M-EST. PATIENT-LVL III: CPT | Mod: PBBFAC,,, | Performed by: INTERNAL MEDICINE

## 2020-08-03 RX ORDER — NAPROXEN 500 MG/1
500 TABLET ORAL 2 TIMES DAILY PRN
Qty: 45 TABLET | Refills: 1 | Status: SHIPPED | OUTPATIENT
Start: 2020-08-03 | End: 2021-03-17

## 2020-08-03 RX ORDER — VALSARTAN 40 MG/1
40 TABLET ORAL DAILY
Qty: 30 TABLET | Refills: 6 | Status: SHIPPED | OUTPATIENT
Start: 2020-08-03 | End: 2020-08-25

## 2020-08-03 NOTE — PROGRESS NOTES
Subjective:       Patient ID: Lorenzo Cross is a 55 y.o. male.    Chief Complaint: Headache (onset 5 days ago) and Dizziness (BP checked thursday morning 148 94, HR 99, Sunday night 131/89)    HTN- new diagnoses.  Multiple     Review of Systems   Constitutional: Negative for appetite change and fever.   HENT: Negative for nosebleeds and trouble swallowing.    Eyes: Negative for discharge and visual disturbance.   Respiratory: Negative for choking and shortness of breath.    Cardiovascular: Negative for chest pain and palpitations.   Gastrointestinal: Negative for abdominal pain, nausea and vomiting.   Musculoskeletal: Negative for arthralgias and joint swelling.   Skin: Negative for rash and wound.   Neurological: Negative for dizziness and syncope.   Psychiatric/Behavioral: Negative for confusion and dysphoric mood.       Objective:      Vitals:    08/03/20 0850   BP: (!) 152/90   Pulse: 86   Temp: 98.6 °F (37 °C)     Physical Exam  Vitals signs reviewed.   Eyes:      Conjunctiva/sclera: Conjunctivae normal.   Neck:      Musculoskeletal: Normal range of motion.   Cardiovascular:      Rate and Rhythm: Normal rate and regular rhythm.   Pulmonary:      Effort: Pulmonary effort is normal.      Breath sounds: Normal breath sounds.   Musculoskeletal:      Comments: Normal ROM bilateral    Skin:     General: Skin is warm and dry.   Neurological:      Cranial Nerves: No cranial nerve deficit (grossly intact).   Psychiatric:      Comments: Alert and orientated           Assessment:       1. Essential hypertension    2. Tension headache        Plan:       Essential hypertension  -     Hypertension Digital Medicine (HDMP) Enrollment Order  -     Hypertension Digital Medicine (HDMP): Assign Onboarding Questionnaires  -     valsartan (DIOVAN) 40 MG tablet; Take 1 tablet (40 mg total) by mouth once daily.  Dispense: 30 tablet; Refill: 6    Tension headache  -     naproxen (NAPROSYN) 500 MG tablet; Take 1 tablet (500 mg total)  by mouth 2 (two) times daily as needed.  Dispense: 45 tablet; Refill: 1            Medication List with Changes/Refills   New Medications    NAPROXEN (NAPROSYN) 500 MG TABLET    Take 1 tablet (500 mg total) by mouth 2 (two) times daily as needed.    VALSARTAN (DIOVAN) 40 MG TABLET    Take 1 tablet (40 mg total) by mouth once daily.   Current Medications    AZELASTINE (ASTELIN) 137 MCG (0.1 %) NASAL SPRAY        FLUTICASONE PROPIONATE (FLONASE) 50 MCG/ACTUATION NASAL SPRAY    2 sprays (100 mcg total) by Each Nostril route once daily.       Continue current management and monitor.    Counseled on regular exercise, maintenance of a healthy weight, balanced diet rich in fruits/vegetables and lean protein, and avoidance of unhealthy habits like smoking and excessive alcohol intake.   Also, counseled on importance of being compliant with medication, health appointments, diet and exercise.     September

## 2020-08-04 ENCOUNTER — TELEPHONE (OUTPATIENT)
Dept: FAMILY MEDICINE | Facility: CLINIC | Age: 55
End: 2020-08-04

## 2020-08-04 ENCOUNTER — PATIENT OUTREACH (OUTPATIENT)
Dept: OTHER | Facility: OTHER | Age: 55
End: 2020-08-04

## 2020-08-04 NOTE — PROGRESS NOTES
Digital Medicine: Health  Introduction    Introduced Lorenzo Cross to Digital Medicine. Discussed health  role and recommended lifestyle modifications.    Called patient and introduced myself as his health .  Patient was at work and had to go,  so I did not complete lifestyle assessment.     The history is provided by the patient.               HYPERTENSION  Explained hypertension digital medicine goals including BP goal less than or equal to 130/80mmHg, improved convenience of BP management and reduced risk of heart attack, kidney failure, stroke, eye disease, dementia, and death.      Explained non-pharmacologic therapies like low salt diet and physical activity can reduce blood pressure. .      Explained that we expect patient to submit several blood pressure readings per week at random times of the day, but at least 30 minutes after taking blood pressure medications. Instructed patient not to allow anyone else to use their blood pressure monitor and phone as data submitted is directly entered into medical record. Reviewed and confirmed appropriate blood pressure monitoring technique.         Patient's BP goal is less than or equal to 130/80.Patient's BP average is 139/93 mmHg, which is above goal, per 2017 ACC/AHA Hypertension Guidelines.            Diet-Assessed    Patient reports eating or drinking the following: Patient states he and his wife just started talking about improving diet and reducing sodium.    Additional diet details:    Physical Activity-Assessed      Additional physical activity details: Patient states he and his wife have just started talking about increasing physical activitiy.      Medication Adherence-Medication adherence was assessed.  Patient continue taking medication as prescribed.        Substance, Sleep, Stress-Not assessed    PLAN  Reviewed Device Techniques  Patient verbalizes understanding.         There are no preventive care reminders to display for this  patient.    Last 5 Patient Entered Readings                                      Current 30 Day Average: 139/93     Recent Readings 8/4/2020 8/3/2020 8/3/2020    SBP (mmHg) 140 138 136    DBP (mmHg) 99 87 93    Pulse 88 97 79

## 2020-08-04 NOTE — LETTER
August 4, 2020     Lorenzo Cross  [address] [place] [id number]       Dear Lorenzo,    Welcome to [facility]! Our goal is to make care effective, proactive and convenient by using data you send us from home to better treat your chronic conditions.              My name is Kimberly Baca, and I am your dedicated Digital Medicine clinician. As an expert in medication management, I will help ensure that the medications you are taking continue to provide the intended benefits and help you reach your goals. You can reach me directly at [phone] or by sending me a message directly through your [facility] account.      I am Priti Myers and I will be your health . My job is to help you identify lifestyle changes to improve your disease control. We will talk about nutrition, exercise, and other ways you may be able to adjust your current habits to better your health. Additionally, we will help ensure you are completing the tests and screenings that are necessary to help manage your conditions. You can reach me directly at [phone] or by sending me a message directly through your [facility] account.    Most importantly, YOU are at the center of this team. Together, we will work to improve your overall health and encourage you to meet your goals for a healthier lifestyle.     What we expect from YOU:  · Please take frequent home blood pressure measurements. We ask that you take at least 1 blood pressure reading per week, but more information will better help us get you know you. Be sure you rest for a few minutes before taking the reading in a quiet, comfortable place.     Be available to receive phone calls or [employer] messages, when appropriate, from your care team. Please let us know if there are any specific days or times that work best for us to reach you via phone.     Complete routine tests and screenings. Dont worry, we will help keep you on track!           What you should  expect from your Digital Medicine Care Team:   We will work with you to create a personalized plan of care and provide you with encouragement and education, including regarding lifestyle changes, that could help you manage your disease states.     We will adjust your current medications, if needed, and continue to monitor your long-term progress.     We will provide you and your physician with monthly progress reports after you have been in the program for more than 30 days.     We will send you reminders through [email or web address] and text messages to help ensure you do not miss any testing deadlines to help manage your disease states.    You will be able to reach us by phone or through your [employer] account by clicking our names under Care Team on the right side of the home screen.    I look forward to working with you to achieve your blood pressure goals!    We look forward to working with you to help manage your health,    Sincerely,    Your Digital Medicine Team    Please visit our websites to learn more:   · Hypertension: [email or web address]      Remember, we are not available for emergencies. If you have an emergency, please contact your doctors office directly or call [facility] on-call ([phone] or [phone]) or [phone].

## 2020-08-04 NOTE — TELEPHONE ENCOUNTER
----- Message from Colette Vázquez sent at 8/4/2020  7:53 AM CDT -----  Contact: call pt [phone]   Type: Needs Medical Advice  Who Called:  pt  Best Call Back Numbercall pt [phone]  Additional Information:  ot  has  question about his  back to  work  note  // please call  to discuss

## 2020-08-04 NOTE — TELEPHONE ENCOUNTER
----- Message from Mary Silverio sent at 8/4/2020  8:05 AM CDT -----  Contact: Sandra Laughlin with [employer] [phone]  Wanting to speak with Nurse to Validate the day and time patient was under doctor care

## 2020-08-05 ENCOUNTER — PATIENT OUTREACH (OUTPATIENT)
Dept: OTHER | Facility: OTHER | Age: 55
End: 2020-08-05

## 2020-08-05 DIAGNOSIS — I10 ESSENTIAL HYPERTENSION: Primary | ICD-10-CM

## 2020-08-05 NOTE — PROGRESS NOTES
"Digital Medicine: Clinician Introduction    Lorenzo Cross is a 55 y.o. male who is newly enrolled in the Digital Medicine Clinic.    Called patient regarding HDMP (Hypertension Digital Medicine Program)    HPI  Patients BP average is currently 132/90 mmHg.    Patient denies s/s of hypotension (lightheadedness, dizziness, nausea, fatigue) associated with low readings. Instructed patient to inform me if this occurs, patient confirms understanding.    Patient denies s/s of hypertension (SOB, CP, severe headaches, changes in vision) associated with high readings. Instructed patient to go to the ED if BP >180/110 and accompanied by hypertensive s/s, patient confirms understanding.    Patient states he was originally checking his BP inaccurately. He has recently watched the [facility] video and states it was extremely helpful. He has checked once accurately and his BP was 126/82.     Mr. Cross states that he eats out at restaurants often. His wife cooks at night for dinner and they are working on her cooking lunch for him to take to work. He will generally have an oatmeal and a granola bar in the morning, eat out at lunch, and then have dinner at home. He states his wife does not add "a lot" of additional salt to her meals.     The history is provided by the patient.      Review of patient's allergies indicates:   -- Latex, natural rubber -- Rash    --  With prolonged exposure, he will develop rash -             raised, red, itchy  Completed Medication Reconciliation  Verified pharmacy information.    HYPERTENSION  Explained hypertension digital medicine goals including BP goal less than or equal to 130/80mmHg, improved convenience of BP management and reduced risk of heart attack, kidney failure, stroke, eye disease, dementia, and death.     Explained non-pharmacologic therapies like low salt diet and physical activity can reduce blood pressure.       Explained that we expect patient to submit several blood pressure " readings per week at random times of the day, but at least 30 minutes after taking blood pressure medications. Instructed patient not to allow anyone else to use their blood pressure monitor and phone as data submitted is directly entered into medical record. Reviewed and confirmed appropriate blood pressure monitoring technique.         Reviewed signs/symptoms of hypertension (headache, changes in vision, chest pain, shortness of breath)   Reviewed signs/symptoms of hypotension (lightheaded, dizziness, weakness)     Patient's BP goal is less than or equal to 130/80. Patients BP average is 132/90 mmHg, which is above goal, per 2017 ACC/AHA Hypertension Guidelines. Patient attributes current blood pressure to: inaccurate technique        Last 5 Patient Entered Readings                                      Current 30 Day Average: 132/90     Recent Readings 8/4/2020 8/4/2020 8/3/2020 8/3/2020    SBP (mmHg) 126 140 138 136    DBP (mmHg) 82 99 87 93    Pulse 91 88 97 79              Depression Screening  Lorenzo Cross did not answer the depression screening. He is not in treatment for depression and is not interested in a referral. Patient feels that depression symptoms are currently controlled.     Sleep Apnea Screening  Patient not previously diagnosed with TOYIN       Medication Affordability Screening  Patient did not answer the medication affordability questionnaires. Patient is currently not having problems affording medications    Medication Adherence-Medication adherence was assessed.  Patient continue taking medication as prescribed.            ASSESSMENT(S)  Patients BP average is 132/90 mmHg, which is above goal. Patient's BP goal is less than or equal to 130/80 per 2017 ACC/AHA Hypertension Guidelines.       Hypertension Plan  Labs ordered. Scheduled BMP Expected Lab Date: 9/1/2020  Continue current therapy. Continue Valsartan 40 mg every morning  Provided patient education. Counseled patient regarding  sodium intake goal of < 1500 mg/day  Will follow up in a month, sooner if needed. If BP is still elevated, will increase Valsartan dose to 80 or 160 mg. Patient verbalized understanding.      Addressed any questions or concerns and patient has my contact information if needed prior to next outreach. Patient verbalizes understanding.      Explained the importance of self-monitoring and medication adherence. Encouraged the patient to communicate with their health  for lifestyle modifications to help improve or maintain a healthy lifestyle.        Sent link to [facility] webpages and my contact information via [employer] for future questions.        Explained to the patient that the Digital Medicine team is not available for emergencies. Advised patient call [facility] ([phone] or [phone]) or [phone] if needed.         There are no preventive care reminders to display for this patient.    Current Medication Regimen:  Hypertension Medications             valsartan (DIOVAN) 40 MG tablet Take 1 tablet (40 mg total) by mouth once daily.      Kimberly Baca, PharmD  Digital Medicine Clinical Pharmacist  [phone]

## 2020-08-13 ENCOUNTER — LAB VISIT (OUTPATIENT)
Dept: LAB | Facility: HOSPITAL | Age: 55
End: 2020-08-13
Attending: INTERNAL MEDICINE
Payer: COMMERCIAL

## 2020-08-13 ENCOUNTER — TELEPHONE (OUTPATIENT)
Dept: FAMILY MEDICINE | Facility: CLINIC | Age: 55
End: 2020-08-13

## 2020-08-13 DIAGNOSIS — Z12.11 COLON CANCER SCREENING: ICD-10-CM

## 2020-08-13 PROCEDURE — 82274 ASSAY TEST FOR BLOOD FECAL: CPT

## 2020-08-13 NOTE — LETTER
Endocrine/General Surgery  [address] [place] [address]  Phone: [phone]  Fax: [phone] August 13, 2020     Patient: Lorenzo Cross   YOB: 1965   Date of Visit: 8/13/2020       To whom it may concern,    I saw Lorenzo Cross today in clinic. Lorenzo Barreto has been under my care since 7/29 and 7/30.    He are clear to return to school/work on 8/4/2020.  He can perform all duties without restriction.     If you have any questions or concerns, please don't hesitate to contact my office. Thank you for accomodating Lorenzo Barreto during this time.   Sincerely,        Clifford Sotelo MD

## 2020-08-13 NOTE — TELEPHONE ENCOUNTER
----- Message from Kaiden Fairchild sent at 8/13/2020 11:52 AM CDT -----  Type: Needs Medical Advice    Who Called:  Patient  Best Call Back Number: [phone]  Additional Information: Patient would like to request a copy of previous letter for work. Please call to advise. Thanks!

## 2020-08-17 ENCOUNTER — PATIENT MESSAGE (OUTPATIENT)
Dept: FAMILY MEDICINE | Facility: CLINIC | Age: 55
End: 2020-08-17

## 2020-08-19 LAB — HEMOCCULT STL QL IA: POSITIVE

## 2020-08-20 ENCOUNTER — PATIENT MESSAGE (OUTPATIENT)
Dept: FAMILY MEDICINE | Facility: CLINIC | Age: 55
End: 2020-08-20

## 2020-08-20 DIAGNOSIS — R19.5 POSITIVE FIT (FECAL IMMUNOCHEMICAL TEST): Primary | ICD-10-CM

## 2020-08-24 ENCOUNTER — TELEPHONE (OUTPATIENT)
Dept: GASTROENTEROLOGY | Facility: CLINIC | Age: 55
End: 2020-08-24

## 2020-08-24 DIAGNOSIS — Z01.818 PREOP TESTING: ICD-10-CM

## 2020-08-25 ENCOUNTER — OFFICE VISIT (OUTPATIENT)
Dept: FAMILY MEDICINE | Facility: CLINIC | Age: 55
End: 2020-08-25
Payer: COMMERCIAL

## 2020-08-25 VITALS — RESPIRATION RATE: 14 BRPM | DIASTOLIC BLOOD PRESSURE: 89 MMHG | SYSTOLIC BLOOD PRESSURE: 139 MMHG | HEART RATE: 80 BPM

## 2020-08-25 DIAGNOSIS — I10 ESSENTIAL HYPERTENSION: Primary | ICD-10-CM

## 2020-08-25 DIAGNOSIS — G44.209 TENSION HEADACHE: ICD-10-CM

## 2020-08-25 DIAGNOSIS — R19.5 FECAL OCCULT BLOOD TEST POSITIVE: ICD-10-CM

## 2020-08-25 PROCEDURE — 99214 OFFICE O/P EST MOD 30 MIN: CPT | Mod: 95,,, | Performed by: INTERNAL MEDICINE

## 2020-08-25 PROCEDURE — 99214 PR OFFICE/OUTPT VISIT, EST, LEVL IV, 30-39 MIN: ICD-10-PCS | Mod: 95,,, | Performed by: INTERNAL MEDICINE

## 2020-08-25 RX ORDER — VALSARTAN 80 MG/1
80 TABLET ORAL DAILY
Qty: 30 TABLET | Refills: 6 | Status: SHIPPED | OUTPATIENT
Start: 2020-08-25 | End: 2020-09-22

## 2020-08-25 NOTE — PROGRESS NOTES
The patient location is: [place]  The chief complaint leading to consultation is: HTN  Visit type: Virtual visit with synchronous audio and video  Total time spent with patient: 18 min  Each patient to whom he or she provides medical services by telemedicine is:  (1) informed of the relationship between the physician and patient and the respective role of any other health care provider with respect to management of the patient; and (2) notified that he or she may decline to receive medical services by telemedicine and may withdraw from such care at any time.    Notes:     HTN - home log = 140s  Headache - improved  + FIT kit - wanted to discuss this.  Colonoscopy ordered.      Review of Systems   Constitutional: Negative for fatigue and fever.   Respiratory: Negative for cough and shortness of breath.    Cardiovascular: Negative for chest pain and palpitations.   Musculoskeletal: Negative for neck pain.   Neurological: Positive for headaches.         Physical Exam  Constitutional:       General: He is not in acute distress.     Appearance: He is well-developed.   Neurological:      Mental Status: He is alert.   Psychiatric:         Speech: Speech normal.         Behavior: Behavior normal. Behavior is cooperative.         Thought Content: Thought content normal.           Assessment:       1. Essential hypertension    2. Fecal occult blood test positive    3. Tension headache        Plan:       Essential hypertension  -     valsartan (DIOVAN) 80 MG tablet; Take 1 tablet (80 mg total) by mouth once daily.  Dispense: 30 tablet; Refill: 6    Fecal occult blood test positive    Tension headache            Medication List with Changes/Refills   Current Medications    AZELASTINE (ASTELIN) 137 MCG (0.1 %) NASAL SPRAY        FLUTICASONE PROPIONATE (FLONASE) 50 MCG/ACTUATION NASAL SPRAY    2 sprays (100 mcg total) by Each Nostril route once daily.    NAPROXEN (NAPROSYN) 500 MG TABLET    Take 1 tablet (500 mg total) by mouth 2  (two) times daily as needed.   Changed and/or Refilled Medications    Modified Medication Previous Medication    VALSARTAN (DIOVAN) 80 MG TABLET valsartan (DIOVAN) 40 MG tablet       Take 1 tablet (80 mg total) by mouth once daily.    Take 1 tablet (40 mg total) by mouth once daily.         Questions answered regarding abnormal FIT kit.     Counseled on regular exercise, maintenance of a healthy weight, balanced diet rich in fruits/vegetables and lean protein, and avoidance of unhealthy habits like smoking and excessive alcohol intake.   Also, counseled on importance of being compliant with medication, health appointments, diet and exercise.     Keep September

## 2020-08-27 ENCOUNTER — PATIENT OUTREACH (OUTPATIENT)
Dept: OTHER | Facility: OTHER | Age: 55
End: 2020-08-27

## 2020-08-27 NOTE — PROGRESS NOTES
Digital Medicine: Health  Follow-Up    The history is provided by the patient.             Reason for review: Blood pressure not at goal        Topics Covered on Call: physical activity and Diet    Additional Follow-up details: Patient states he is feeling well.  He reports that Dr. Sotelo increased BP medication a couple of days ago and so far he is feeling fine.          Diet-no change to diet    No change to diet.  Patient reports eating or drinking the following: Patient states he has not made any dietary changes yet. I offered my assistance when he is ready to make dietary changes.      Physical Activity-no change to routine  No change to exercise routine.       Intervention(s): provided exercise ideas         Additional physical activity details: Patient states he has not started exercising yet. I educated patient on how exercise can lower BP.      Medication Adherence-Medication adherence was assessed.          Provided patient education. Benefits of physical activity       Addressed any questions or concerns and patient has my contact information if needed prior to next outreach. Patient verbalizes understanding.      Explained the importance of self-monitoring and medication adherence. Encouraged the patient to communicate with their health  for lifestyle modifications to help improve or maintain a healthy lifestyle.            There are no preventive care reminders to display for this patient.    Last 5 Patient Entered Readings                                      Current 30 Day Average: 132/88     Recent Readings 8/25/2020 8/24/2020 8/24/2020 8/23/2020 8/21/2020    SBP (mmHg) 121 143 144 140 142    DBP (mmHg) 85 94 94 92 91    Pulse 88 83 87 86 82

## 2020-09-06 ENCOUNTER — LAB VISIT (OUTPATIENT)
Dept: FAMILY MEDICINE | Facility: CLINIC | Age: 55
End: 2020-09-06
Payer: COMMERCIAL

## 2020-09-06 DIAGNOSIS — Z01.818 PREOP TESTING: ICD-10-CM

## 2020-09-06 PROCEDURE — U0003 INFECTIOUS AGENT DETECTION BY NUCLEIC ACID (DNA OR RNA); SEVERE ACUTE RESPIRATORY SYNDROME CORONAVIRUS 2 (SARS-COV-2) (CORONAVIRUS DISEASE [COVID-19]), AMPLIFIED PROBE TECHNIQUE, MAKING USE OF HIGH THROUGHPUT TECHNOLOGIES AS DESCRIBED BY CMS-2020-01-R: HCPCS

## 2020-09-07 LAB — SARS-COV-2 RNA RESP QL NAA+PROBE: NOT DETECTED

## 2020-09-08 ENCOUNTER — TELEPHONE (OUTPATIENT)
Dept: GASTROENTEROLOGY | Facility: CLINIC | Age: 55
End: 2020-09-08

## 2020-09-08 NOTE — TELEPHONE ENCOUNTER
----- Message from Nancy Blanco MA sent at 9/8/2020 12:52 PM CDT -----  Type: Needs Medical Advice  Who Called:  Lorenzo Oglesby Call Back Number: [phone]  Additional Information: patient is calling about his time for his colonoscopy for tomorrow.

## 2020-09-09 ENCOUNTER — ANESTHESIA (OUTPATIENT)
Dept: ENDOSCOPY | Facility: HOSPITAL | Age: 55
End: 2020-09-09
Payer: COMMERCIAL

## 2020-09-09 ENCOUNTER — HOSPITAL ENCOUNTER (OUTPATIENT)
Facility: HOSPITAL | Age: 55
Discharge: HOME | End: 2020-09-09
Attending: INTERNAL MEDICINE | Admitting: INTERNAL MEDICINE
Payer: COMMERCIAL

## 2020-09-09 ENCOUNTER — ANESTHESIA EVENT (OUTPATIENT)
Dept: ENDOSCOPY | Facility: HOSPITAL | Age: 55
End: 2020-09-09
Payer: COMMERCIAL

## 2020-09-09 DIAGNOSIS — R19.5 POSITIVE FIT (FECAL IMMUNOCHEMICAL TEST): ICD-10-CM

## 2020-09-09 PROCEDURE — 45378 PR COLONOSCOPY,DIAGNOSTIC: ICD-10-PCS | Mod: ,,, | Performed by: INTERNAL MEDICINE

## 2020-09-09 PROCEDURE — 37000009 HC ANESTHESIA EA ADD 15 MINS: Mod: PO | Performed by: INTERNAL MEDICINE

## 2020-09-09 PROCEDURE — 45378 DIAGNOSTIC COLONOSCOPY: CPT | Mod: ,,, | Performed by: INTERNAL MEDICINE

## 2020-09-09 PROCEDURE — 63600175 PHARM REV CODE 636 W HCPCS: Mod: PO | Performed by: INTERNAL MEDICINE

## 2020-09-09 PROCEDURE — D9220A PRA ANESTHESIA: ICD-10-PCS | Mod: CRNA,,, | Performed by: NURSE ANESTHETIST, CERTIFIED REGISTERED

## 2020-09-09 PROCEDURE — D9220A PRA ANESTHESIA: Mod: CRNA,,, | Performed by: NURSE ANESTHETIST, CERTIFIED REGISTERED

## 2020-09-09 PROCEDURE — D9220A PRA ANESTHESIA: ICD-10-PCS | Mod: ANES,,, | Performed by: ANESTHESIOLOGY

## 2020-09-09 PROCEDURE — 63600175 PHARM REV CODE 636 W HCPCS: Mod: PO | Performed by: NURSE ANESTHETIST, CERTIFIED REGISTERED

## 2020-09-09 PROCEDURE — 25000003 PHARM REV CODE 250: Mod: PO | Performed by: NURSE ANESTHETIST, CERTIFIED REGISTERED

## 2020-09-09 PROCEDURE — 45378 DIAGNOSTIC COLONOSCOPY: CPT | Mod: PO | Performed by: INTERNAL MEDICINE

## 2020-09-09 PROCEDURE — 37000008 HC ANESTHESIA 1ST 15 MINUTES: Mod: PO | Performed by: INTERNAL MEDICINE

## 2020-09-09 PROCEDURE — D9220A PRA ANESTHESIA: Mod: ANES,,, | Performed by: ANESTHESIOLOGY

## 2020-09-09 RX ORDER — LIDOCAINE HCL/PF 100 MG/5ML
SYRINGE (ML) INTRAVENOUS
Status: DISCONTINUED | OUTPATIENT
Start: 2020-09-09 | End: 2020-09-09

## 2020-09-09 RX ORDER — SODIUM CHLORIDE 0.9 % (FLUSH) 0.9 %
10 SYRINGE (ML) INJECTION
Status: DISCONTINUED | OUTPATIENT
Start: 2020-09-09 | End: 2020-09-09 | Stop reason: HOSPADM

## 2020-09-09 RX ORDER — PROPOFOL 10 MG/ML
VIAL (ML) INTRAVENOUS
Status: DISCONTINUED | OUTPATIENT
Start: 2020-09-09 | End: 2020-09-09

## 2020-09-09 RX ORDER — SODIUM CHLORIDE, SODIUM LACTATE, POTASSIUM CHLORIDE, CALCIUM CHLORIDE 600; 310; 30; 20 MG/100ML; MG/100ML; MG/100ML; MG/100ML
INJECTION, SOLUTION INTRAVENOUS CONTINUOUS
Status: DISCONTINUED | OUTPATIENT
Start: 2020-09-09 | End: 2020-09-09 | Stop reason: HOSPADM

## 2020-09-09 RX ADMIN — LIDOCAINE HYDROCHLORIDE 100 MG: 20 INJECTION, SOLUTION INTRAVENOUS at 10:09

## 2020-09-09 RX ADMIN — PROPOFOL 50 MG: 10 INJECTION, EMULSION INTRAVENOUS at 10:09

## 2020-09-09 RX ADMIN — PROPOFOL 100 MG: 10 INJECTION, EMULSION INTRAVENOUS at 10:09

## 2020-09-09 RX ADMIN — SODIUM CHLORIDE, SODIUM LACTATE, POTASSIUM CHLORIDE, AND CALCIUM CHLORIDE: .6; .31; .03; .02 INJECTION, SOLUTION INTRAVENOUS at 09:09

## 2020-09-09 NOTE — DISCHARGE SUMMARY
Discharge Note  Short Stay      SUMMARY     Admit Date: 9/9/2020    Attending Physician: Perry Rush MD     Discharge Physician: Perry Rush MD    Discharge Date: 9/9/2020 10:37 AM    Final Diagnosis: Positive FIT (fecal immunochemical test) [R19.5]    Disposition: HOME OR SELF CARE    Patient Instructions:   Current Discharge Medication List      CONTINUE these medications which have NOT CHANGED    Details   fluticasone propionate (FLONASE) 50 mcg/actuation nasal spray 2 sprays (100 mcg total) by Each Nostril route once daily.  Qty: 16 g, Refills: 11    Associated Diagnoses: PND (post-nasal drip)      naproxen (NAPROSYN) 500 MG tablet Take 1 tablet (500 mg total) by mouth 2 (two) times daily as needed.  Qty: 45 tablet, Refills: 1    Associated Diagnoses: Tension headache      valsartan (DIOVAN) 80 MG tablet Take 1 tablet (80 mg total) by mouth once daily.  Qty: 30 tablet, Refills: 6    Comments: .  Associated Diagnoses: Essential hypertension      azelastine (ASTELIN) 137 mcg (0.1 %) nasal spray              Discharge Procedure Orders (must include Diet, Follow-up, Activity)    Follow Up:  Follow up with PCP as previously scheduled  Resume routine diet.  Activity as tolerated.    No driving day of procedure.

## 2020-09-09 NOTE — TRANSFER OF CARE
"Anesthesia Transfer of Care Note    Patient: Lorenzo Cross    Procedure(s) Performed: Procedure(s) (LRB):  COLONOSCOPY (N/A)    Patient location: PACU    Anesthesia Type: general    Transport from OR: Transported from OR on 2-3 L/min O2 by NC with adequate spontaneous ventilation    Post pain: adequate analgesia    Post assessment: no apparent anesthetic complications and tolerated procedure well    Post vital signs: stable    Level of consciousness: sedated    Nausea/Vomiting: no nausea/vomiting    Complications: none    Transfer of care protocol was followed      Last vitals:   Visit Vitals  /78 (BP Location: Right arm, Patient Position: Lying)   Pulse (P) 73   Temp 36.5 °C (97.7 °F) (Skin)   Resp (P) 16   Ht 5' 11" (1.803 m)   Wt 108.9 kg (240 lb)   SpO2 (P) 99%   BMI 33.47 kg/m²     "

## 2020-09-09 NOTE — ANESTHESIA POSTPROCEDURE EVALUATION
Anesthesia Post Evaluation    Patient: Lorenzo Cross    Procedure(s) Performed: Procedure(s) (LRB):  COLONOSCOPY (N/A)    Final Anesthesia Type: general    Patient location during evaluation: PACU  Patient participation: Yes- Able to Participate  Level of consciousness: awake and alert  Post-procedure vital signs: reviewed and stable  Pain management: adequate  Airway patency: patent    PONV status at discharge: No PONV  Anesthetic complications: no      Cardiovascular status: hemodynamically stable  Respiratory status: unassisted and room air  Hydration status: euvolemic  Follow-up not needed.          Vitals Value Taken Time   /74 09/09/20 1054   Temp 36.6 °C (97.8 °F) 09/09/20 1042   Pulse 68 09/09/20 1054   Resp 16 09/09/20 1054   SpO2 98 % 09/09/20 1054         Event Time   Out of Recovery 11:03:01         Pain/Guicho Score: Guicho Score: 10 (9/9/2020 10:42 AM)

## 2020-09-09 NOTE — PROVATION PATIENT INSTRUCTIONS
Discharge Summary/Instructions after an Endoscopic Procedure  Patient Name: Lorenzo Cross  Patient MRN: [id number]  Patient YOB: 1965 Wednesday, September 9, 2020  Perry Rush MD  RESTRICTIONS:  During your procedure today, you received medications for sedation.  These   medications may affect your judgment, balance and coordination.  Therefore,   for 24 hours, you have the following restrictions:   - DO NOT drive a car, operate machinery, make legal/financial decisions,   sign important papers or drink alcohol.    ACTIVITY:  Today: no heavy lifting, straining or running due to procedural   sedation/anesthesia.  The following day: return to full activity including work.  DIET:  Eat and drink normally unless instructed otherwise.     TREATMENT FOR COMMON SIDE EFFECTS:  - Mild abdominal pain, nausea, belching, bloating or excessive gas:  rest,   eat lightly and use a heating pad.  - Sore Throat: treat with throat lozenges and/or gargle with warm salt   water.  - Because air was used during the procedure, expelling large amounts of air   from your rectum or belching is normal.  - If a bowel prep was taken, you may not have a bowel movement for 1-3 days.    This is normal.  SYMPTOMS TO WATCH FOR AND REPORT TO YOUR PHYSICIAN:  1. Abdominal pain or bloating, other than gas cramps.  2. Chest pain.  3. Back pain.  4. Signs of infection such as: chills or fever occurring within 24 hours   after the procedure.  5. Rectal bleeding, which would show as bright red, maroon, or black stools.   (A tablespoon of blood from the rectum is not serious, especially if   hemorrhoids are present.)  6. Vomiting.  7. Weakness or dizziness.  GO DIRECTLY TO THE NEAREST EMERGENCY ROOM IF YOU HAVE ANY OF THE FOLLOWING:      Difficulty breathing              Chills and/or fever over 101 F   Persistent vomiting and/or vomiting blood   Severe abdominal pain   Severe chest pain   Black, tarry stools   Bleeding- more than one  tablespoon   Any other symptom or condition that you feel may need urgent attention  Your doctor recommends these additional instructions:  If any biopsies were taken, your doctors clinic will contact you in 1 to 2   weeks with any results.  Your physician has recommended a repeat colonoscopy in 10 years for   screening purposes.   You are being discharged to home.  For questions, problems or results please call your physician - Perry Rush MD at Work:  [phone].  EMERGENCY PHONE NUMBER: [phone], LAB RESULTS: [phone]  IF A COMPLICATION OR EMERGENCY SITUATION ARISES AND YOU ARE UNABLE TO REACH   YOUR PHYSICIAN - GO DIRECTLY TO THE EMERGENCY ROOM.  ___________________________________________  Nurse Signature  ___________________________________________  Patient/Designated Responsible Party Signature  Perry Rush MD  9/9/2020 10:36:52 AM  This report has been verified and signed electronically.  [employer]

## 2020-09-09 NOTE — ANESTHESIA PREPROCEDURE EVALUATION
09/09/2020  Lorenzo Cross is a 55 y.o., male.    Anesthesia Evaluation    I have reviewed the Patient Summary Reports.    I have reviewed the Nursing Notes. I have reviewed the NPO Status.   I have reviewed the Medications.     Review of Systems  Anesthesia Hx:  No problems with previous Anesthesia    Social:  Former Smoker    Hematology/Oncology:  Hematology Normal   Oncology Normal     EENT/Dental:EENT/Dental Normal   Cardiovascular:   Hypertension    Pulmonary:  Pulmonary Normal    Renal/:  Renal/ Normal     Hepatic/GI:   Liver Disease,    Musculoskeletal:  Musculoskeletal Normal    Neurological:  Neurology Normal    Endocrine:  Endocrine Normal    Dermatological:  Skin Normal    Psych:  Psychiatric Normal           Physical Exam  General:  Obesity    Airway/Jaw/Neck:  Airway Findings: Mouth Opening: Normal Tongue: Normal  General Airway Assessment: Adult  Mallampati: II        Eyes/Ears/Nose:  EYES/EARS/NOSE FINDINGS: Normal   Dental:  DENTAL FINDINGS: Normal   Chest/Lungs:  Chest/Lungs Findings: Clear to auscultation, Normal Respiratory Rate     Heart/Vascular:  Heart Findings: Rate: Normal  Rhythm: Regular Rhythm        Mental Status:  Mental Status Findings:  Cooperative, Alert and Oriented         Anesthesia Plan  Type of Anesthesia, risks & benefits discussed:  Anesthesia Type:  general  Patient's Preference:   Intra-op Monitoring Plan: standard ASA monitors  Intra-op Monitoring Plan Comments:   Post Op Pain Control Plan:   Post Op Pain Control Plan Comments:   Induction:   IV  Beta Blocker:  Patient is not currently on a Beta-Blocker (No further documentation required).       Informed Consent: Patient understands risks and agrees with Anesthesia plan.  Questions answered. Anesthesia consent signed with patient.  ASA Score: 2     Day of Surgery Review of History & Physical: I have interviewed  and examined the patient. I have reviewed the patient's H&P dated:    H&P update referred to the provider.         Ready For Surgery From Anesthesia Perspective.

## 2020-09-09 NOTE — H&P
History & Physical - Short Stay  Gastroenterology      SUBJECTIVE:     Procedure: Colonoscopy    Chief Complaint/Indication for Procedure: Rectal Bleeding    PTA Medications   Medication Sig    fluticasone propionate (FLONASE) 50 mcg/actuation nasal spray 2 sprays (100 mcg total) by Each Nostril route once daily.    naproxen (NAPROSYN) 500 MG tablet Take 1 tablet (500 mg total) by mouth 2 (two) times daily as needed.    valsartan (DIOVAN) 80 MG tablet Take 1 tablet (80 mg total) by mouth once daily.    azelastine (ASTELIN) 137 mcg (0.1 %) nasal spray        Review of patient's allergies indicates:   Allergen Reactions    Latex, natural rubber Rash     With prolonged exposure, he will develop rash - raised, red, itchy        Past Medical History:   Diagnosis Date    Hypertension      Past Surgical History:   Procedure Laterality Date    HAND SURGERY      LEG SURGERY      RT    THIGH BIOSPY       Family History   Problem Relation Age of Onset    Hypertension Mother     Heart disease Mother     Diabetes Mother     Heart disease Father     Cancer Father     Cirrhosis Neg Hx      Social History     Tobacco Use    Smoking status: Former Smoker     Quit date: 3/9/2013     Years since quittin.5    Smokeless tobacco: Former User   Substance Use Topics    Alcohol use: No     Frequency: Never     Binge frequency: Never     Comment: none in 6 years    Drug use: No         OBJECTIVE:     Vital Signs (Most Recent)  Temp: 97.7 °F (36.5 °C) (20)  Pulse: 70 (20)  Resp: 17 (20)  BP: 129/78 (20)  SpO2: 99 % (20)    Physical Exam:                                                       GENERAL:  Comfortable, in no acute distress.                                 HEENT EXAM:  Nonicteric.  No adenopathy.  Oropharynx is clear.               NECK:  Supple.                                                               LUNGS:  Clear.                                                                CARDIAC:  Regular rate and rhythm.  S1, S2.  No murmur.                      ABDOMEN:  Soft, positive bowel sounds, nontender.  No hepatosplenomegaly or masses.  No rebound or guarding.                                             EXTREMITIES:  No edema.     MENTAL STATUS:  Normal, alert and oriented.      ASSESSMENT/PLAN:     Assessment: Rectal Bleeding    Plan: Colonoscopy    Anesthesia Plan: General    ASA Grade: ASA 2 - Patient with mild systemic disease with no functional limitations    MALLAMPATI SCORE:  I (soft palate, uvula, fauces, and tonsillar pillars visible)     In my medical opinion and judgment, this medical or surgical procedure was not able to be safely postponed in accordance with [place] Department of Health, Healthcare Facility Notice #2020-COVID19-ALL-007.

## 2020-09-10 VITALS
TEMPERATURE: 98 F | BODY MASS INDEX: 33.6 KG/M2 | OXYGEN SATURATION: 98 % | RESPIRATION RATE: 16 BRPM | HEIGHT: 71 IN | HEART RATE: 68 BPM | WEIGHT: 240 LBS | SYSTOLIC BLOOD PRESSURE: 140 MMHG | DIASTOLIC BLOOD PRESSURE: 74 MMHG

## 2020-09-16 ENCOUNTER — OFFICE VISIT (OUTPATIENT)
Dept: FAMILY MEDICINE | Facility: CLINIC | Age: 55
End: 2020-09-16
Payer: COMMERCIAL

## 2020-09-16 ENCOUNTER — CLINICAL SUPPORT (OUTPATIENT)
Dept: CARDIOLOGY | Facility: CLINIC | Age: 55
End: 2020-09-16
Payer: COMMERCIAL

## 2020-09-16 ENCOUNTER — CLINICAL SUPPORT (OUTPATIENT)
Dept: INTERNAL MEDICINE | Facility: CLINIC | Age: 55
End: 2020-09-16
Payer: COMMERCIAL

## 2020-09-16 VITALS
BODY MASS INDEX: 34.6 KG/M2 | OXYGEN SATURATION: 97 % | SYSTOLIC BLOOD PRESSURE: 124 MMHG | WEIGHT: 247.13 LBS | HEART RATE: 80 BPM | HEIGHT: 71 IN | DIASTOLIC BLOOD PRESSURE: 76 MMHG

## 2020-09-16 DIAGNOSIS — Z00.00 ANNUAL PHYSICAL EXAM: Primary | ICD-10-CM

## 2020-09-16 DIAGNOSIS — Z00.00 ANNUAL PHYSICAL EXAM: ICD-10-CM

## 2020-09-16 DIAGNOSIS — Z00.00 WELLNESS EXAMINATION: Primary | ICD-10-CM

## 2020-09-16 LAB
ALBUMIN SERPL BCP-MCNC: 4.2 G/DL (ref 3.5–5.2)
ALP SERPL-CCNC: 83 U/L (ref 55–135)
ALT SERPL W/O P-5'-P-CCNC: 44 U/L (ref 10–44)
ANION GAP SERPL CALC-SCNC: 10 MMOL/L (ref 8–16)
AST SERPL-CCNC: 27 U/L (ref 10–40)
BILIRUB SERPL-MCNC: 0.5 MG/DL (ref 0.1–1)
BUN SERPL-MCNC: 14 MG/DL (ref 6–20)
CALCIUM SERPL-MCNC: 8.6 MG/DL (ref 8.7–10.5)
CHLORIDE SERPL-SCNC: 105 MMOL/L (ref 95–110)
CHOLEST SERPL-MCNC: 138 MG/DL (ref 120–199)
CHOLEST/HDLC SERPL: 4.1 {RATIO} (ref 2–5)
CO2 SERPL-SCNC: 25 MMOL/L (ref 23–29)
COMPLEXED PSA SERPL-MCNC: 1.2 NG/ML (ref 0–4)
CREAT SERPL-MCNC: 0.8 MG/DL (ref 0.5–1.4)
ERYTHROCYTE [DISTWIDTH] IN BLOOD BY AUTOMATED COUNT: 13.2 % (ref 11.5–14.5)
EST. GFR  (AFRICAN AMERICAN): >60 ML/MIN/1.73 M^2
EST. GFR  (NON AFRICAN AMERICAN): >60 ML/MIN/1.73 M^2
GLUCOSE SERPL-MCNC: 103 MG/DL (ref 70–110)
HCT VFR BLD AUTO: 42.3 % (ref 40–54)
HDLC SERPL-MCNC: 34 MG/DL (ref 40–75)
HDLC SERPL: 24.6 % (ref 20–50)
HGB BLD-MCNC: 14.2 G/DL (ref 14–18)
LDLC SERPL CALC-MCNC: 75.4 MG/DL (ref 63–159)
MCH RBC QN AUTO: 30.3 PG (ref 27–31)
MCHC RBC AUTO-ENTMCNC: 33.6 G/DL (ref 32–36)
MCV RBC AUTO: 90 FL (ref 82–98)
NONHDLC SERPL-MCNC: 104 MG/DL
PLATELET # BLD AUTO: 196 K/UL (ref 150–350)
PMV BLD AUTO: 11.2 FL (ref 9.2–12.9)
POTASSIUM SERPL-SCNC: 4.1 MMOL/L (ref 3.5–5.1)
PROT SERPL-MCNC: 7 G/DL (ref 6–8.4)
RBC # BLD AUTO: 4.69 M/UL (ref 4.6–6.2)
SODIUM SERPL-SCNC: 140 MMOL/L (ref 136–145)
TRIGL SERPL-MCNC: 143 MG/DL (ref 30–150)
WBC # BLD AUTO: 6.14 K/UL (ref 3.9–12.7)

## 2020-09-16 PROCEDURE — 93000 ELECTROCARDIOGRAM COMPLETE: CPT | Mod: S$GLB,,, | Performed by: INTERNAL MEDICINE

## 2020-09-16 PROCEDURE — 99396 PREV VISIT EST AGE 40-64: CPT | Mod: 25,S$GLB,, | Performed by: INTERNAL MEDICINE

## 2020-09-16 PROCEDURE — 99999 PR PBB SHADOW E&M-EST. PATIENT-LVL III: CPT | Mod: PBBFAC,,, | Performed by: INTERNAL MEDICINE

## 2020-09-16 PROCEDURE — 84153 ASSAY OF PSA TOTAL: CPT

## 2020-09-16 PROCEDURE — 99999 PR PBB SHADOW E&M-EST. PATIENT-LVL I: ICD-10-PCS | Mod: PBBFAC,,,

## 2020-09-16 PROCEDURE — 99999 PR PBB SHADOW E&M-EST. PATIENT-LVL III: ICD-10-PCS | Mod: PBBFAC,,, | Performed by: INTERNAL MEDICINE

## 2020-09-16 PROCEDURE — 85027 COMPLETE CBC AUTOMATED: CPT | Mod: PO

## 2020-09-16 PROCEDURE — 90471 IMMUNIZATION ADMIN: CPT | Mod: S$GLB,,, | Performed by: INTERNAL MEDICINE

## 2020-09-16 PROCEDURE — 90686 FLU VACCINE (QUAD) GREATER THAN OR EQUAL TO 3YO PRESERVATIVE FREE IM: ICD-10-PCS | Mod: S$GLB,,, | Performed by: INTERNAL MEDICINE

## 2020-09-16 PROCEDURE — 99999 PR PBB SHADOW E&M-EST. PATIENT-LVL I: CPT | Mod: PBBFAC,,,

## 2020-09-16 PROCEDURE — 93000 EKG 12-LEAD: ICD-10-PCS | Mod: S$GLB,,, | Performed by: INTERNAL MEDICINE

## 2020-09-16 PROCEDURE — 86703 HIV-1/HIV-2 1 RESULT ANTBDY: CPT

## 2020-09-16 PROCEDURE — 90471 FLU VACCINE (QUAD) GREATER THAN OR EQUAL TO 3YO PRESERVATIVE FREE IM: ICD-10-PCS | Mod: S$GLB,,, | Performed by: INTERNAL MEDICINE

## 2020-09-16 PROCEDURE — 80061 LIPID PANEL: CPT

## 2020-09-16 PROCEDURE — 80053 COMPREHEN METABOLIC PANEL: CPT | Mod: PO

## 2020-09-16 PROCEDURE — 99396 PR PREVENTIVE VISIT,EST,40-64: ICD-10-PCS | Mod: 25,S$GLB,, | Performed by: INTERNAL MEDICINE

## 2020-09-16 PROCEDURE — 90686 IIV4 VACC NO PRSV 0.5 ML IM: CPT | Mod: S$GLB,,, | Performed by: INTERNAL MEDICINE

## 2020-09-16 NOTE — PROGRESS NOTES
September 16, 2020                                                                                                                                                                                                                                                                                      Lorenzo Cross  [address] [place] [id number]                                                                                                                                                                                                                                                                                                RE: Lorenzo Cross                                                        Clinic #:[id number]                                                                                                                                   Dear  Lorenzo Cross,                                                                                                                                           Thank you for allowing me to serve you and perform your Executive Health exam on September 16, 2020.   This letter will serve a brief summary of the history, physical findings, and laboratory/studies performed and recommendations at that time.                                                                                         REASON FOR VISIT: Executive Health Preventive Physical Examination    Past Medical History:   Diagnosis Date    Hypertension        Past Surgical History:   Procedure Laterality Date    COLONOSCOPY N/A 9/9/2020    Procedure: COLONOSCOPY;  Surgeon: Perry Rush MD;  Location: [facility];  Service: Endoscopy;  Laterality: N/A;    HAND SURGERY      LEG SURGERY      RT    THIGH BIOSPY         Family History   Problem Relation Age of Onset    Hypertension Mother     Heart disease Mother     Diabetes Mother     Heart disease Father     Cancer Father     Cirrhosis Neg Hx         Social History     Socioeconomic History    Marital status:      Spouse name: YOBANI    Number of children: 4    Years of education: Not on file    Highest education level: Not on file   Occupational History     Employer: [employer]   Social Needs    Financial resource strain: Not hard at all    Food insecurity     Worry: Never true     Inability: Never true    Transportation needs     Medical: No     Non-medical: No   Tobacco Use    Smoking status: Former Smoker     Quit date: 3/9/2013     Years since quittin.5    Smokeless tobacco: Former User   Substance and Sexual Activity    Alcohol use: No     Frequency: Never     Binge frequency: Never     Comment: none in 6 years    Drug use: No    Sexual activity: Not on file   Lifestyle    Physical activity     Days per week: 5 days     Minutes per session: 20 min    Stress: Not at all   Relationships    Social connections     Talks on phone: More than three times a week     Gets together: Once a week     Attends Quaker service: Not on file     Active member of club or organization: No     Attends meetings of clubs or organizations: Never     Relationship status:    Other Topics Concern    Not on file   Social History Narrative    Not on file       Allergies: Latex, natural rubber    Current Outpatient Medications   Medication Sig Dispense Refill    fluticasone propionate (FLONASE) 50 mcg/actuation nasal spray 2 sprays (100 mcg total) by Each Nostril route once daily. 16 g 11    naproxen (NAPROSYN) 500 MG tablet Take 1 tablet (500 mg total) by mouth 2 (two) times daily as needed. 45 tablet 1    valsartan (DIOVAN) 80 MG tablet Take 1 tablet (80 mg total) by mouth once daily. 30 tablet 6    azelastine (ASTELIN) 137 mcg (0.1 %) nasal spray        No current facility-administered medications for this visit.        REVIEW OF SYSTEMS:  No recent changes in weight, or complaints of fatigue. No recent changes in vision, or  "hearing. Denies frequent headaches.No recent changes in voice. No new or changing skin lesions. Denies abnormal bruising or bleeding.  Denies chest pain or sensation of skipped beats. No new onset of shortness of breath, or dyspnea on exertion. Denies abdominal discomfort, constipation, diarrhea,or blood in stool. Denies difficulty with urination.   No recent joint swelling or muscle discomfort. Denies pain or weakness in extremities. No recent loss of balance. Denies problems with sleep or depression.        Remainder of the review of systems without pertinent positives at this time.                                                                              PHYSICAL EXAM:   VITAL SIGNS: /76   Pulse 80   Ht 5' 11" (1.803 m)   Wt 112.1 kg (247 lb 2.2 oz)   SpO2 97%   BMI 34.47 kg/m²   GENERAL APPEARANCE:  Well nourished and normally developed,  pleasant 55 y.o. male, in good spirits.  SKIN: Without rashes or overt lesions.  HEENT: Head normacephalic. There was no scleral icterus. Mucous membranes were moist. Dentition. Neck is supple, no thyromegaly, or carotid bruits.  LUNGS: Clear to auscultation bilaterally. Normal respiratory effort.  HEART: Exam reveals regular rate and rhythm. First and second heart sounds normal. No murmurs, rubs or gallops.   ABDOMEN: Soft, non-tender, non-distended. Exam reveals normal bowl sounds, no masses, no organomegaly and no aortic enlargement.    EXTREMITIES:  Non edematous, both femoral and pedal pulses are normal. No joint stiffness or tenderness. Full range of motion and strength, upper and lower bilaterally.    LAB DATA/STUDIES REVIEWED:  LABS:  Acceptable   EKG:  Normal       ASSESSMENT/RECOMMENDATIONS :    At this time,  you appear to be in good medical condition.    Continue to work on regular exercise, maintenance of a healthy weight, balanced diet rich in fruits/vegetables and lean protein, and avoidance of unhealthy habits like smoking and excessive alcohol " intake.  I look forward to seeing you again next year.    Please contact me should you have any questions or concerns regarding physical findings, or my recommendations.       Sincerely yours,          Clifford Sotelo M.D.  Department of Internal Medicine  [facility]

## 2020-09-17 ENCOUNTER — TELEPHONE (OUTPATIENT)
Dept: FAMILY MEDICINE | Facility: CLINIC | Age: 55
End: 2020-09-17

## 2020-09-17 LAB — HIV 1+2 AB+HIV1 P24 AG SERPL QL IA: NEGATIVE

## 2020-09-17 NOTE — TELEPHONE ENCOUNTER
BP cuff compare: his runs high and controlled (his sbp 145-155), start adipex w f/u; his accurate, inc valsartan to 80 bid, then f/u for adipex; telem

## 2020-09-22 ENCOUNTER — CLINICAL SUPPORT (OUTPATIENT)
Dept: FAMILY MEDICINE | Facility: CLINIC | Age: 55
End: 2020-09-22
Payer: COMMERCIAL

## 2020-09-22 ENCOUNTER — TELEPHONE (OUTPATIENT)
Dept: FAMILY MEDICINE | Facility: CLINIC | Age: 55
End: 2020-09-22

## 2020-09-22 VITALS — DIASTOLIC BLOOD PRESSURE: 84 MMHG | SYSTOLIC BLOOD PRESSURE: 138 MMHG | HEART RATE: 81 BPM

## 2020-09-22 DIAGNOSIS — I10 ESSENTIAL HYPERTENSION: Primary | ICD-10-CM

## 2020-09-22 DIAGNOSIS — Z01.30 BP CHECK: Primary | ICD-10-CM

## 2020-09-22 DIAGNOSIS — E66.9 OBESITY (BMI 30-39.9): ICD-10-CM

## 2020-09-22 PROCEDURE — 99499 NO LOS: ICD-10-PCS | Mod: S$GLB,,, | Performed by: INTERNAL MEDICINE

## 2020-09-22 PROCEDURE — 99999 PR PBB SHADOW E&M-EST. PATIENT-LVL III: CPT | Mod: PBBFAC,,,

## 2020-09-22 PROCEDURE — 99999 PR PBB SHADOW E&M-EST. PATIENT-LVL III: ICD-10-PCS | Mod: PBBFAC,,,

## 2020-09-22 PROCEDURE — 99499 UNLISTED E&M SERVICE: CPT | Mod: S$GLB,,, | Performed by: INTERNAL MEDICINE

## 2020-09-22 RX ORDER — VALSARTAN 80 MG/1
80 TABLET ORAL 2 TIMES DAILY
Qty: 60 TABLET | Refills: 6 | Status: SHIPPED | OUTPATIENT
Start: 2020-09-22 | End: 2020-10-13

## 2020-09-22 RX ORDER — PHENTERMINE HYDROCHLORIDE 37.5 MG/1
37.5 TABLET ORAL
Qty: 30 TABLET | Refills: 0 | Status: SHIPPED | OUTPATIENT
Start: 2020-09-22 | End: 2020-10-21 | Stop reason: SDUPTHER

## 2020-09-22 NOTE — TELEPHONE ENCOUNTER
Compared cuff:  His runs sbp 8 higher and 6 higher dbp.  High normal today here.    Increase Valsartan  Send in Adipex with 1 mo f/u.

## 2020-09-22 NOTE — PROGRESS NOTES
Lorenzo Cross 55 y.o. male is here today for Blood Pressure check.   History of HTN yes.    Review of patient's allergies indicates:   Allergen Reactions    Latex, natural rubber Rash     With prolonged exposure, he will develop rash - raised, red, itchy     Creatinine   Date Value Ref Range Status   09/16/2020 0.8 0.5 - 1.4 mg/dL Final     Sodium   Date Value Ref Range Status   09/16/2020 140 136 - 145 mmol/L Final     Potassium   Date Value Ref Range Status   09/16/2020 4.1 3.5 - 5.1 mmol/L Final   ]  Patient verifies taking blood pressure medications on a regular basis at the same time of the day.     Current Outpatient Medications:     azelastine (ASTELIN) 137 mcg (0.1 %) nasal spray, , Disp: , Rfl:     fluticasone propionate (FLONASE) 50 mcg/actuation nasal spray, 2 sprays (100 mcg total) by Each Nostril route once daily., Disp: 16 g, Rfl: 11    naproxen (NAPROSYN) 500 MG tablet, Take 1 tablet (500 mg total) by mouth 2 (two) times daily as needed., Disp: 45 tablet, Rfl: 1    phentermine (ADIPEX-P) 37.5 mg tablet, Take 1 tablet (37.5 mg total) by mouth before breakfast., Disp: 30 tablet, Rfl: 0    valsartan (DIOVAN) 80 MG tablet, Take 1 tablet (80 mg total) by mouth 2 (two) times daily., Disp: 60 tablet, Rfl: 6  Does patient have record of home blood pressure readings yes. Readings have been averaging 140/88.   Last dose of blood pressure medication was taken at today.  Patient is asymptomatic.   Complains of nothing.    BP: 138/84 , Pulse: 81 .    Blood pressure reading after 15 minutes was n/a, Pulse n/a.  Dr. Sotelo notified.

## 2020-09-23 DIAGNOSIS — M25.562 LEFT KNEE PAIN, UNSPECIFIED CHRONICITY: Primary | ICD-10-CM

## 2020-09-24 ENCOUNTER — PATIENT OUTREACH (OUTPATIENT)
Dept: ADMINISTRATIVE | Facility: OTHER | Age: 55
End: 2020-09-24

## 2020-09-24 NOTE — PROGRESS NOTES
LINKS immunization registry not responding  Care Everywhere updated  Health Maintenance updated  Chart reviewed for overdue Proactive [facility] (PREM) health maintenance testing (CRS, Breast Ca, Diabetic Eye Exam)   Orders entered:N/A

## 2020-09-25 ENCOUNTER — OFFICE VISIT (OUTPATIENT)
Dept: ORTHOPEDICS | Facility: CLINIC | Age: 55
End: 2020-09-25
Payer: COMMERCIAL

## 2020-09-25 ENCOUNTER — HOSPITAL ENCOUNTER (OUTPATIENT)
Dept: RADIOLOGY | Facility: HOSPITAL | Age: 55
Discharge: HOME | End: 2020-09-25
Attending: ORTHOPAEDIC SURGERY
Payer: COMMERCIAL

## 2020-09-25 VITALS — BODY MASS INDEX: 34.6 KG/M2 | HEIGHT: 71 IN | WEIGHT: 247.13 LBS

## 2020-09-25 DIAGNOSIS — M25.561 CHRONIC PAIN OF RIGHT KNEE: Primary | ICD-10-CM

## 2020-09-25 DIAGNOSIS — G89.29 CHRONIC PAIN OF RIGHT KNEE: Primary | ICD-10-CM

## 2020-09-25 DIAGNOSIS — M25.561 RIGHT KNEE PAIN, UNSPECIFIED CHRONICITY: ICD-10-CM

## 2020-09-25 DIAGNOSIS — M25.562 LEFT KNEE PAIN, UNSPECIFIED CHRONICITY: ICD-10-CM

## 2020-09-25 PROCEDURE — 73560 X-RAY EXAM OF KNEE 1 OR 2: CPT | Mod: TC,PO,LT

## 2020-09-25 PROCEDURE — 99243 PR OFFICE CONSULTATION,LEVEL III: ICD-10-PCS | Mod: S$GLB,,, | Performed by: ORTHOPAEDIC SURGERY

## 2020-09-25 PROCEDURE — 99243 OFF/OP CNSLTJ NEW/EST LOW 30: CPT | Mod: S$GLB,,, | Performed by: ORTHOPAEDIC SURGERY

## 2020-09-25 PROCEDURE — 99999 PR PBB SHADOW E&M-EST. PATIENT-LVL III: CPT | Mod: PBBFAC,,, | Performed by: ORTHOPAEDIC SURGERY

## 2020-09-25 PROCEDURE — 73562 X-RAY EXAM OF KNEE 3: CPT | Mod: TC,PO,RT

## 2020-09-25 PROCEDURE — 73562 XR KNEE ORTHO RIGHT: ICD-10-PCS | Mod: 26,RT,, | Performed by: RADIOLOGY

## 2020-09-25 PROCEDURE — 73560 XR KNEE ORTHO RIGHT: ICD-10-PCS | Mod: 26,LT,, | Performed by: RADIOLOGY

## 2020-09-25 PROCEDURE — 73560 X-RAY EXAM OF KNEE 1 OR 2: CPT | Mod: 26,LT,, | Performed by: RADIOLOGY

## 2020-09-25 PROCEDURE — 99999 PR PBB SHADOW E&M-EST. PATIENT-LVL III: ICD-10-PCS | Mod: PBBFAC,,, | Performed by: ORTHOPAEDIC SURGERY

## 2020-09-25 PROCEDURE — 73562 X-RAY EXAM OF KNEE 3: CPT | Mod: 26,RT,, | Performed by: RADIOLOGY

## 2020-09-25 NOTE — PROGRESS NOTES
55 years old complaining of pain in the right knee which has had now for several years time.  Feels that all started wants playing football about 30 years ago had a valgus injury to the knee seem to gotten somewhat better and then about 6 years goes coming down a ladder awkwardly landed on his leg felt pain in his knee bothersome since then 6 on the pain scale.  Ice and stretching seems to help worse being on his feet all day seems to make it worse.  Patient reports has been in therapy with partial relief    Exam shows tenderness the joint line without signs infection or instability, positive Ana testing, skin is intact as well as extensor mechanism    X-rays show look like mild arthrosis    Assessment:  Right knee arthrosis possible meniscal tear, right knee    Plan:  We will get an MRI of his knee in follow-up after that to discuss different treatment options    Patient seen as a consult from Dr. Clifford Sotelo, communication via epic    Further History  Aching pain  Worse with activity  Relieved with rest  No other associated symptoms  No other radiation    Further Exam  Alert and oriented  Pleasant  Contralateral limb has appropriate range of motion for age and condition  Contralateral limb has appropriate strength for age and condition  Contralateral limb has appropriate stability  for age and condition  No adenopathy  Pulses are appropriate for current condition  Skin is intact        Chief Complaint    Chief Complaint   Patient presents with    Left Knee - Pain       HPI  Lorenzo Cross is a 55 y.o.  male who presents with       Past Medical History  Past Medical History:   Diagnosis Date    Hypertension        Past Surgical History  Past Surgical History:   Procedure Laterality Date    COLONOSCOPY N/A 9/9/2020    Procedure: COLONOSCOPY;  Surgeon: Perry Rush MD;  [facility];  Service: Endoscopy;  Laterality: N/A;    HAND SURGERY      LEG SURGERY      RT    THIGH BIOSPY          Medications  Current Outpatient Medications   Medication Sig    azelastine (ASTELIN) 137 mcg (0.1 %) nasal spray     fluticasone propionate (FLONASE) 50 mcg/actuation nasal spray 2 sprays (100 mcg total) by Each Nostril route once daily.    naproxen (NAPROSYN) 500 MG tablet Take 1 tablet (500 mg total) by mouth 2 (two) times daily as needed.    phentermine (ADIPEX-P) 37.5 mg tablet Take 1 tablet (37.5 mg total) by mouth before breakfast.    valsartan (DIOVAN) 80 MG tablet Take 1 tablet (80 mg total) by mouth 2 (two) times daily.     No current facility-administered medications for this visit.        Allergies  Review of patient's allergies indicates:   Allergen Reactions    Latex, natural rubber Rash     With prolonged exposure, he will develop rash - raised, red, itchy       Family History  Family History   Problem Relation Age of Onset    Hypertension Mother     Heart disease Mother     Diabetes Mother     Heart disease Father     Cancer Father     Cirrhosis Neg Hx        Social History  Social History     Socioeconomic History    Marital status:      Spouse name: YOBANI    Number of children: 4    Years of education: Not on file    Highest education level: Not on file   Occupational History     Employer: [employer]   Social Needs    Financial resource strain: Not hard at all    Food insecurity     Worry: Never true     Inability: Never true    Transportation needs     Medical: No     Non-medical: No   Tobacco Use    Smoking status: Former Smoker     Quit date: 3/9/2013     Years since quittin.5    Smokeless tobacco: Former User   Substance and Sexual Activity    Alcohol use: No     Frequency: Never     Binge frequency: Never     Comment: none in 6 years    Drug use: No    Sexual activity: Not on file   Lifestyle    Physical activity     Days per week: 5 days     Minutes per session: 20 min    Stress: Not at all   Relationships    Social connections     Talks  on phone: More than three times a week     Gets together: Once a week     Attends Sikh service: Not on file     Active member of club or organization: No     Attends meetings of clubs or organizations: Never     Relationship status:    Other Topics Concern    Not on file   Social History Narrative    Not on file               Review of Systems     Constitutional: Negative    HENT: Negative  Eyes: Negative  Respiratory: Negative  Cardiovascular: Negative  Musculoskeletal: HPI  Skin: Negative  Neurological: Negative  Hematological: Negative  Endocrine: Negative                 Physical Exam    There were no vitals filed for this visit.  Body mass index is 34.47 kg/m².  Physical Examination:     General appearance -  well appearing, and in no distress  Mental status - awake  Neck - supple  Chest -  symmetric air entry  Heart - normal rate   Abdomen - soft      Assessment     1. Chronic pain of right knee          Plan

## 2020-09-25 NOTE — LETTER
September 25, 2020      Clifford Sotelo MD  [address] [facility] [address] [place] [address]           [facility] [place]  [address] [place] [address]  Phone: [phone]          Patient: Lorenzo Cross   MR Number: [id number]   YOB: 1965   Date of Visit: 9/25/2020       Dear Dr. Clifford Sotelo:    Thank you for referring Lorenzo Cross to me for evaluation. Attached you will find relevant portions of my assessment and plan of care.    If you have questions, please do not hesitate to call me. I look forward to following Lorenzo Cross along with you.    Sincerely,    Flavio Drew MD    Enclosure  CC:  No Recipients    If you would like to receive this communication electronically, please contact [email or web address] or [phone] to request more information on [email or web address] Link access.    For providers and/or their staff who would like to refer a patient to [facility], please contact us through our one-stop-shop provider referral line, [facility] , at [phone].    If you feel you have received this communication in error or would no longer like to receive these types of communications, please e-mail [email or web address]

## 2020-09-29 ENCOUNTER — PATIENT OUTREACH (OUTPATIENT)
Dept: OTHER | Facility: OTHER | Age: 55
End: 2020-09-29

## 2020-09-29 NOTE — PROGRESS NOTES
Digital Medicine: Health  Follow-Up    The history is provided by the patient.             Reason for review: Blood pressure not at goal        Topics Covered on Call: physical activity and Diet    Additional Follow-up details: Patient states he is feeling well and denies any symptoms of hypertension.  He says he took his cuff to a dr appt and the digital cuff is reading 10 pt higher than the office cuff.          Diet-Change  No 24 hour dietary recall  Patient reports eating or drinking the following: Patient says he has been eating healthier. He states he has cut out fried foods and is watching his portions. We discussed using [employer]wan to help track calories.  Dietary Improvements:Decreased portion sizes, cut back on fried food.              Physical Activity-Change      Additional physical activity details: Patient says he has been helping his wife in her restaurant and has been doing a lot of walking.      Medication Adherence-Medication adherence was assessed.      Substance, Sleep, Stress-Not assessed      Continue current diet/physical activity routine.  Provided patient education. Diet       Addressed patient questions and patient has my contact information if needed prior to next outreach. Patient verbalizes understanding.      Explained the importance of self-monitoring and medication adherence. Encouraged the patient to communicate with their health  for lifestyle modifications to help improve or maintain a healthy lifestyle.            There are no preventive care reminders to display for this patient.    Last 5 Patient Entered Readings                                      Current 30 Day Average: 136/89     Recent Readings 9/28/2020 9/26/2020 9/22/2020 9/22/2020 9/22/2020    SBP (mmHg) 141 118 140 152 146    DBP (mmHg) 90 80 90 94 90    Pulse 88 96 75 78 80

## 2020-10-06 ENCOUNTER — HOSPITAL ENCOUNTER (OUTPATIENT)
Dept: RADIOLOGY | Facility: HOSPITAL | Age: 55
Discharge: HOME | End: 2020-10-06
Attending: ORTHOPAEDIC SURGERY
Payer: COMMERCIAL

## 2020-10-06 DIAGNOSIS — G89.29 CHRONIC PAIN OF RIGHT KNEE: ICD-10-CM

## 2020-10-06 DIAGNOSIS — M25.561 CHRONIC PAIN OF RIGHT KNEE: ICD-10-CM

## 2020-10-06 PROCEDURE — 73721 MRI KNEE WITHOUT CONTRAST RIGHT: ICD-10-PCS | Mod: 26,RT,, | Performed by: RADIOLOGY

## 2020-10-06 PROCEDURE — 73721 MRI JNT OF LWR EXTRE W/O DYE: CPT | Mod: TC,PO,RT

## 2020-10-06 PROCEDURE — 73721 MRI JNT OF LWR EXTRE W/O DYE: CPT | Mod: 26,RT,, | Performed by: RADIOLOGY

## 2020-10-07 ENCOUNTER — PATIENT OUTREACH (OUTPATIENT)
Dept: ADMINISTRATIVE | Facility: HOSPITAL | Age: 55
End: 2020-10-07

## 2020-10-07 NOTE — PROGRESS NOTES
Chart review completed 10/07/2020.  Care Everywhere updates requested and reviewed.  Immunizations reconciled. Media reports reviewed.  Duplicate HM overrides and  orders removed.  Overdue HM topic chart audit and/or requested.  Overdue lab testing linked to upcoming lab appointments if applies.    Recently outreached/reviewed  Links down 10/07/2020

## 2020-10-09 ENCOUNTER — OFFICE VISIT (OUTPATIENT)
Dept: ORTHOPEDICS | Facility: CLINIC | Age: 55
End: 2020-10-09
Payer: COMMERCIAL

## 2020-10-09 VITALS — HEIGHT: 71 IN | BODY MASS INDEX: 34.6 KG/M2 | WEIGHT: 247.13 LBS

## 2020-10-09 DIAGNOSIS — G89.29 CHRONIC PAIN OF RIGHT KNEE: Primary | ICD-10-CM

## 2020-10-09 DIAGNOSIS — M25.561 CHRONIC PAIN OF RIGHT KNEE: Primary | ICD-10-CM

## 2020-10-09 PROCEDURE — 99213 PR OFFICE/OUTPT VISIT, EST, LEVL III, 20-29 MIN: ICD-10-PCS | Mod: S$GLB,,, | Performed by: ORTHOPAEDIC SURGERY

## 2020-10-09 PROCEDURE — 99999 PR PBB SHADOW E&M-EST. PATIENT-LVL III: ICD-10-PCS | Mod: PBBFAC,,, | Performed by: ORTHOPAEDIC SURGERY

## 2020-10-09 PROCEDURE — 99213 OFFICE O/P EST LOW 20 MIN: CPT | Mod: S$GLB,,, | Performed by: ORTHOPAEDIC SURGERY

## 2020-10-09 PROCEDURE — 99999 PR PBB SHADOW E&M-EST. PATIENT-LVL III: CPT | Mod: PBBFAC,,, | Performed by: ORTHOPAEDIC SURGERY

## 2020-10-09 NOTE — PROGRESS NOTES
55 years old senior today in follow-up of the MRI of his knee which showed a posterior horn medial meniscal tear as well as what looks like an old ACL tear and some mild arthritis of all compartments    At this point he is not complaining of knee instability with knee pain diffusely throughout the knee    We are going to schedule a cortisone shot into the knee when he can rest it for 2 days and recommend strengthening and a knee brace and physical therapy

## 2020-10-13 ENCOUNTER — PATIENT OUTREACH (OUTPATIENT)
Dept: OTHER | Facility: OTHER | Age: 55
End: 2020-10-13

## 2020-10-13 DIAGNOSIS — I10 ESSENTIAL HYPERTENSION: ICD-10-CM

## 2020-10-13 RX ORDER — VALSARTAN 80 MG/1
160 TABLET ORAL DAILY
Qty: 60 TABLET | Refills: 6
Start: 2020-10-13 | End: 2020-10-21

## 2020-10-13 NOTE — PROGRESS NOTES
Digital Medicine: Clinician Follow-Up    Called patient for routine follow up regarding HDMP (Hypertension Digital Medicine Program)    HPI  Patients BP average is currently 137/90 mmHg.    Patient states he took his machine to his pcp's office to compare BP readings. He states his [employer] machine was 10 mmHg higher than his provider's BP machine.       The history is provided by the patient.      Review of patient's allergies indicates:   -- Latex, natural rubber -- Rash    --  With prolonged exposure, he will develop rash -             raised, red, itchy  Follow-up reason(s): medication change follow-up and routine follow up.     Hypertension    Patient's blood pressure is stable.   Patient is not experiencing signs/symptoms of hypotension.  Patient is not experiencing signs/symptoms of hypertension.      Additional Follow-up details: Pcp increased Valsartan to 80 mg BID on 9/17. Patient reports he often forgets to take the evening dose because he is not used to taking medications at night. He states his provider told him to do twice daily dosing because the medication might wear off by the evening time since it is a generic medication. He reports no side effects.    Patient did make medication change.    Is patient tolerating med change? yes            Last 5 Patient Entered Readings                                      Current 30 Day Average: 137/90     Recent Readings 10/12/2020 10/6/2020 10/5/2020 10/3/2020 10/2/2020    SBP (mmHg) 142 147 137 132 140    DBP (mmHg) 93 96 86 85 92    Pulse 88 96 84 96 93                 Depression Screening  Did not address depression screening.    Sleep Apnea Screening    Did not address sleep apnea screening.     Medication Affordability Screening  Did not address medication affordability screening.     Medication Adherence-Medication adherence was asssessed.    Patient reported missing medication: more than once a week.    Patient identified the following reasons for  non-adherence:  Patient forgets.            ASSESSMENT(S)  Patients BP average is 137/90 mmHg, which is above goal. Patient's BP goal is less than or equal to 130/80.     Hypertension Plan  Additional monitoring needed.  Continue current therapy. Encouraged patient to take 160 mg of valsartan in the morning to prevent nonadherence  Continue current diet/physical activity routine.  Provided patient education. Discussed with patient the acceptable variety between different BP machines  If patient's BP is elevated in a few weeks, will increase valsartan to 320 mg. Will call patient in a few weeks, sooner if needed. Patient knows to reach out at any time for any questions or concerns.        Addressed patient questions and patient has my contact information if needed prior to next outreach. Patient verbalizes understanding.             There are no preventive care reminders to display for this patient.  There are no preventive care reminders to display for this patient.      Hypertension Medications             valsartan (DIOVAN) 80 MG tablet Take 2 tablets (160 mg total) by mouth once daily.        Kimberly Baca, PharmD  Digital Medicine Clinical Pharmacist  [phone]

## 2020-10-21 ENCOUNTER — OFFICE VISIT (OUTPATIENT)
Dept: FAMILY MEDICINE | Facility: CLINIC | Age: 55
End: 2020-10-21
Payer: COMMERCIAL

## 2020-10-21 VITALS
DIASTOLIC BLOOD PRESSURE: 88 MMHG | WEIGHT: 242 LBS | BODY MASS INDEX: 33.75 KG/M2 | SYSTOLIC BLOOD PRESSURE: 138 MMHG | RESPIRATION RATE: 12 BRPM | HEART RATE: 86 BPM

## 2020-10-21 DIAGNOSIS — I10 ESSENTIAL HYPERTENSION: Primary | ICD-10-CM

## 2020-10-21 DIAGNOSIS — E66.9 OBESITY (BMI 30.0-34.9): ICD-10-CM

## 2020-10-21 DIAGNOSIS — N52.9 ERECTILE DYSFUNCTION, UNSPECIFIED ERECTILE DYSFUNCTION TYPE: ICD-10-CM

## 2020-10-21 DIAGNOSIS — E66.9 OBESITY (BMI 30-39.9): ICD-10-CM

## 2020-10-21 PROCEDURE — 99214 OFFICE O/P EST MOD 30 MIN: CPT | Mod: 95,,, | Performed by: INTERNAL MEDICINE

## 2020-10-21 PROCEDURE — 99214 PR OFFICE/OUTPT VISIT, EST, LEVL IV, 30-39 MIN: ICD-10-PCS | Mod: 95,,, | Performed by: INTERNAL MEDICINE

## 2020-10-21 RX ORDER — PHENTERMINE HYDROCHLORIDE 37.5 MG/1
37.5 TABLET ORAL
Qty: 30 TABLET | Refills: 0 | Status: SHIPPED | OUTPATIENT
Start: 2020-10-21 | End: 2020-11-17 | Stop reason: SDUPTHER

## 2020-10-21 RX ORDER — TADALAFIL 20 MG/1
TABLET ORAL
Qty: 6 TABLET | Refills: 11 | Status: SHIPPED | OUTPATIENT
Start: 2020-10-21 | End: 2020-11-17 | Stop reason: SDUPTHER

## 2020-10-21 RX ORDER — VALSARTAN 160 MG/1
160 TABLET ORAL DAILY
Qty: 90 TABLET | Refills: 3
Start: 2020-10-21 | End: 2020-11-24

## 2020-10-21 NOTE — PROGRESS NOTES
The patient location is: [place]  The chief complaint leading to consultation is: HTN  Visit type: Virtual visit with synchronous audio and video  Total time spent with patient: 20 min  Each patient to whom he or she provides medical services by telemedicine is:  (1) informed of the relationship between the physician and patient and the respective role of any other health care provider with respect to management of the patient; and (2) notified that he or she may decline to receive medical services by telemedicine and may withdraw from such care at any time.    Notes:     HTN - mildly uncontrolled; forgets night time dose.  Better if takes all in am.  ED - new complaint.  Obesity - lost 5 lb and several inches on belt with Adipex help.      Review of Systems   Constitutional: Negative for activity change, appetite change, fever and unexpected weight change.   HENT: Negative for hearing loss, nosebleeds, rhinorrhea and trouble swallowing.    Eyes: Negative for discharge and visual disturbance.   Respiratory: Negative for choking, chest tightness, shortness of breath and wheezing.    Cardiovascular: Negative for chest pain and palpitations.   Gastrointestinal: Negative for abdominal pain, blood in stool, constipation, diarrhea, nausea and vomiting.   Endocrine: Negative for polydipsia and polyuria.   Genitourinary: Negative for difficulty urinating, hematuria and urgency.   Musculoskeletal: Positive for neck pain. Negative for arthralgias and joint swelling.   Skin: Negative for rash and wound.   Neurological: Negative for dizziness, syncope, weakness and headaches.   Psychiatric/Behavioral: Negative for confusion and dysphoric mood.         Physical Exam  Constitutional:       General: He is not in acute distress.     Appearance: He is well-developed.   Neurological:      Mental Status: He is alert.   Psychiatric:         Speech: Speech normal.         Behavior: Behavior normal. Behavior is cooperative.         Thought  Content: Thought content normal.           Assessment:       1. Essential hypertension    2. Erectile dysfunction, unspecified erectile dysfunction type    3. Obesity (BMI 30.0-34.9)    4. Obesity (BMI 30-39.9)        Plan:       Essential hypertension  -     valsartan (DIOVAN) 160 MG tablet; Take 1 tablet (160 mg total) by mouth once daily.  Dispense: 90 tablet; Refill: 3    Erectile dysfunction, unspecified erectile dysfunction type  -     tadalafiL (CIALIS) 20 MG Tab; Use one tablet every 36 hours prn  Dispense: 6 tablet; Refill: 11    Obesity (BMI 30.0-34.9)    Obesity (BMI 30-39.9)  -     phentermine (ADIPEX-P) 37.5 mg tablet; Take 1 tablet (37.5 mg total) by mouth before breakfast.  Dispense: 30 tablet; Refill: 0            Medication List with Changes/Refills   New Medications    TADALAFIL (CIALIS) 20 MG TAB    Use one tablet every 36 hours prn   Current Medications    AZELASTINE (ASTELIN) 137 MCG (0.1 %) NASAL SPRAY        FLUTICASONE PROPIONATE (FLONASE) 50 MCG/ACTUATION NASAL SPRAY    2 sprays (100 mcg total) by Each Nostril route once daily.    NAPROXEN (NAPROSYN) 500 MG TABLET    Take 1 tablet (500 mg total) by mouth 2 (two) times daily as needed.   Changed and/or Refilled Medications    Modified Medication Previous Medication    PHENTERMINE (ADIPEX-P) 37.5 MG TABLET phentermine (ADIPEX-P) 37.5 mg tablet       Take 1 tablet (37.5 mg total) by mouth before breakfast.    Take 1 tablet (37.5 mg total) by mouth before breakfast.    VALSARTAN (DIOVAN) 160 MG TABLET valsartan (DIOVAN) 80 MG tablet       Take 1 tablet (160 mg total) by mouth once daily.    Take 2 tablets (160 mg total) by mouth once daily.     2/3   Continue current management and monitor.    Counseled on regular exercise, maintenance of a healthy weight, balanced diet rich in fruits/vegetables and lean protein, and avoidance of unhealthy habits like smoking and excessive alcohol intake.   Also, counseled on importance of being compliant with  medication, health appointments, diet and exercise.     Follow up in about 27 days (around 11/17/2020).

## 2020-11-03 ENCOUNTER — PATIENT OUTREACH (OUTPATIENT)
Dept: OTHER | Facility: OTHER | Age: 55
End: 2020-11-03

## 2020-11-03 NOTE — PROGRESS NOTES
Digital Medicine: Clinician Follow-Up    Called patient for routine follow up regarding HDMP (Hypertension Digital Medicine Program)    HPI  Patients BP average is currently 138/90 mmHg.    Patient states he takes 80 mg of valsartan in the morning and 80 mg a few hours later but will sometimes forget to take the second 80 mg dose.      The history is provided by the patient.   Follow-up reason(s): routine follow up.     Hypertension    Readings are trending down   Patient is not experiencing signs/symptoms of hypotension.  Patient is not experiencing signs/symptoms of hypertension.            Last 5 Patient Entered Readings                                      Current 30 Day Average: 138/90     Recent Readings 11/1/2020 10/28/2020 10/25/2020 10/24/2020 10/23/2020    SBP (mmHg) 141 115 145 125 135    DBP (mmHg) 92 75 87 81 92    Pulse 87 100 90 94 93                 Depression Screening  Did not address depression screening.    Sleep Apnea Screening    Did not address sleep apnea screening.     Medication Affordability Screening  Did not address medication affordability screening.     Medication Adherence-Medication adherence was asssessed.    Patient reported missing medication: once a week.    Patient identified the following reasons for non-adherence:  Patient forgets.            ASSESSMENT(S)  Patients BP average is 138/90 mmHg, which is above goal. Patient's BP goal is less than or equal to 130/80.     Hypertension Plan  Continue current therapy. Informed patient to take 160 mg of valsartan every morning.  Will call patient in a few weeks, sooner if needed. If BP is high, will increase valsartan to 320 mg. Patient knows to reach out at any time for any questions or concerns.        Addressed patient questions and patient has my contact information if needed prior to next outreach. Patient verbalizes understanding.             There are no preventive care reminders to display for this patient.  There are no  preventive care reminders to display for this patient.      Hypertension Medications             valsartan (DIOVAN) 160 MG tablet Take 1 tablet (160 mg total) by mouth once daily.          Kimberly Baca, PharmD  Digital Medicine Clinical Pharmacist  [phone]

## 2020-11-11 ENCOUNTER — PATIENT OUTREACH (OUTPATIENT)
Dept: OTHER | Facility: OTHER | Age: 55
End: 2020-11-11

## 2020-11-11 NOTE — PROGRESS NOTES
Digital Medicine: Health  Follow-Up    The history is provided by the patient.             Reason for review: Blood pressure not at goal        Topics Covered on Call: physical activity and Diet    Additional Follow-up details: Patient says he is feeling good and has been working on his diet to help lower his BP.  He denies any symptoms of hypertension.            Diet-Change      Dietary Improvements:Patient says he has been working on eating less fried and processed foods.            Intervention(s): Mediterranean style diet recommended and DASH diet/sodium reduction education      Physical Activity-no change to routine  No change to exercise routine.       Additional physical activity details: Patient states he still needs to start a walking regimen. He says the only activity he gets is at work.      Medication Adherence-Medication adherence was assessed.      Substance, Sleep, Stress-Not assessed      Continue current diet/physical activity routine. Increase exercise  Provided patient education. Diet and exercise       Addressed patient questions and patient has my contact information if needed prior to next outreach. Patient verbalizes understanding.      Explained the importance of self-monitoring and medication adherence. Encouraged the patient to communicate with their health  for lifestyle modifications to help improve or maintain a healthy lifestyle.               There are no preventive care reminders to display for this patient.      Last 5 Patient Entered Readings                                      Current 30 Day Average: 136/90     Recent Readings 11/10/2020 11/1/2020 10/28/2020 10/25/2020 10/24/2020    SBP (mmHg) 123 141 115 145 125    DBP (mmHg) 87 92 75 87 81    Pulse 87 87 100 90 94

## 2020-11-17 ENCOUNTER — OFFICE VISIT (OUTPATIENT)
Dept: FAMILY MEDICINE | Facility: CLINIC | Age: 55
End: 2020-11-17
Payer: COMMERCIAL

## 2020-11-17 DIAGNOSIS — E66.9 OBESITY (BMI 30-39.9): ICD-10-CM

## 2020-11-17 DIAGNOSIS — N52.9 ERECTILE DYSFUNCTION, UNSPECIFIED ERECTILE DYSFUNCTION TYPE: ICD-10-CM

## 2020-11-17 DIAGNOSIS — I10 ESSENTIAL HYPERTENSION: Primary | ICD-10-CM

## 2020-11-17 PROCEDURE — 99214 OFFICE O/P EST MOD 30 MIN: CPT | Mod: 95,,, | Performed by: INTERNAL MEDICINE

## 2020-11-17 PROCEDURE — 99214 PR OFFICE/OUTPT VISIT, EST, LEVL IV, 30-39 MIN: ICD-10-PCS | Mod: 95,,, | Performed by: INTERNAL MEDICINE

## 2020-11-17 RX ORDER — TADALAFIL 20 MG/1
TABLET ORAL
Qty: 30 TABLET | Refills: 11 | Status: SHIPPED | OUTPATIENT
Start: 2020-11-17 | End: 2021-12-23 | Stop reason: SDUPTHER

## 2020-11-17 RX ORDER — PHENTERMINE HYDROCHLORIDE 37.5 MG/1
37.5 TABLET ORAL
Qty: 30 TABLET | Refills: 0 | Status: SHIPPED | OUTPATIENT
Start: 2020-11-17 | End: 2020-12-17

## 2020-11-17 NOTE — PROGRESS NOTES
The patient location is [place]  The chief complaint leading to consultation is: HTN  Visit type: Virtual visit with synchronous audio and video  Total time spent with patient: 15 min  Each patient to whom he or she provides medical services by telemedicine is:  (1) informed of the relationship between the physician and patient and the respective role of any other health care provider with respect to management of the patient; and (2) notified that he or she may decline to receive medical services by telemedicine and may withdraw from such care at any time.    Notes:      HTN - mostly controlled  ED - controlled - [facility]  Obesity - lost 3 lb on Adipex    Review of Systems   Constitutional: Negative for activity change and unexpected weight change.   HENT: Negative for hearing loss, rhinorrhea and trouble swallowing.    Eyes: Negative for discharge and visual disturbance.   Respiratory: Negative for chest tightness and wheezing.    Cardiovascular: Negative for chest pain and palpitations.   Gastrointestinal: Negative for blood in stool, constipation, diarrhea and vomiting.   Endocrine: Negative for polydipsia and polyuria.   Genitourinary: Negative for difficulty urinating, hematuria and urgency.   Musculoskeletal: Negative for arthralgias, joint swelling and neck pain.   Neurological: Negative for weakness and headaches.   Psychiatric/Behavioral: Negative for confusion and dysphoric mood.         Physical Exam  Constitutional:       General: He is not in acute distress.     Appearance: He is well-developed.   Neurological:      Mental Status: He is alert.   Psychiatric:         Speech: Speech normal.         Behavior: Behavior normal. Behavior is cooperative.         Thought Content: Thought content normal.           Assessment:       1. Essential hypertension    2. Obesity (BMI 30-39.9)    3. Erectile dysfunction, unspecified erectile dysfunction type        Plan:       Essential hypertension    Obesity  (BMI 30-39.9)  -     phentermine (ADIPEX-P) 37.5 mg tablet; Take 1 tablet (37.5 mg total) by mouth before breakfast.  Dispense: 30 tablet; Refill: 0    Erectile dysfunction, unspecified erectile dysfunction type  -     tadalafiL (CIALIS) 20 MG Tab; Take one tablet by mouth every 36 hours as needed  Dispense: 30 tablet; Refill: 11            Medication List with Changes/Refills   Current Medications    AZELASTINE (ASTELIN) 137 MCG (0.1 %) NASAL SPRAY        FLUTICASONE PROPIONATE (FLONASE) 50 MCG/ACTUATION NASAL SPRAY    2 sprays (100 mcg total) by Each Nostril route once daily.    NAPROXEN (NAPROSYN) 500 MG TABLET    Take 1 tablet (500 mg total) by mouth 2 (two) times daily as needed.    VALSARTAN (DIOVAN) 160 MG TABLET    Take 1 tablet (160 mg total) by mouth once daily.   Changed and/or Refilled Medications    Modified Medication Previous Medication    PHENTERMINE (ADIPEX-P) 37.5 MG TABLET phentermine (ADIPEX-P) 37.5 mg tablet       Take 1 tablet (37.5 mg total) by mouth before breakfast.    Take 1 tablet (37.5 mg total) by mouth before breakfast.    TADALAFIL (CIALIS) 20 MG TAB tadalafiL (CIALIS) 20 MG Tab       Take one tablet by mouth every 36 hours as needed    Take one tablet by mouth every 36 hours as needed     3/3  Continue current management and monitor.    Counseled on regular exercise, maintenance of a healthy weight, balanced diet rich in fruits/vegetables and lean protein, and avoidance of unhealthy habits like smoking and excessive alcohol intake.   Also, counseled on importance of being compliant with medication, health appointments, diet and exercise.     Follow up in about 4 months (around 3/17/2021).

## 2020-11-24 ENCOUNTER — PATIENT OUTREACH (OUTPATIENT)
Dept: OTHER | Facility: OTHER | Age: 55
End: 2020-11-24

## 2020-11-24 DIAGNOSIS — I10 ESSENTIAL HYPERTENSION: ICD-10-CM

## 2020-11-24 RX ORDER — VALSARTAN 160 MG/1
320 TABLET ORAL DAILY
Qty: 90 TABLET | Refills: 3
Start: 2020-11-24 | End: 2020-12-15

## 2020-11-24 NOTE — PROGRESS NOTES
Digital Medicine: Clinician Follow-Up    Called patient for routine follow up regarding HDMP (Hypertension Digital Medicine Program)    HPI  Patients BP average is currently 128/83 mmHg.      The history is provided by the patient.      Review of patient's allergies indicates:   -- Latex, natural rubber -- Rash    --  With prolonged exposure, he will develop rash -             raised, red, itchy  Follow-up reason(s): routine follow up.     Hypertension    Readings are trending down due to medication adherence.       Patient is not experiencing signs/symptoms of hypotension.  Patient is not experiencing signs/symptoms of hypertension.            Last 5 Patient Entered Readings                                      Current 30 Day Average: 128/83     Recent Readings 11/18/2020 11/15/2020 11/11/2020 11/10/2020 11/1/2020    SBP (mmHg) 136 121 115 123 141    DBP (mmHg) 87 79 74 87 92    Pulse 85 86 118 87 87                 Depression Screening  Did not address depression screening.    Sleep Apnea Screening    Did not address sleep apnea screening.     Medication Affordability Screening  Did not address medication affordability screening.     Medication Adherence-Medication adherence was assessed.          ASSESSMENT(S)  Patients BP average is 128/83 mmHg, which is above goal. Patient's BP goal is less than or equal to 130/80.     Hypertension Plan  Hypertension Medication Change. Increase valsartan to 320 mg.  Additional monitoring needed.  Will call patient in a few weeks, sooner if needed. Patient knows to reach out at any time for any questions or concerns.        Addressed patient questions and patient has my contact information if needed prior to next outreach. Patient verbalizes understanding.             There are no preventive care reminders to display for this patient.  There are no preventive care reminders to display for this patient.      Hypertension Medications             valsartan (DIOVAN) 160 MG tablet  Take 2 tablets (320 mg total) by mouth once daily.        Kimberly Baca, PharmD  Digital Medicine Clinician  [phone]

## 2020-12-08 ENCOUNTER — PATIENT OUTREACH (OUTPATIENT)
Dept: OTHER | Facility: OTHER | Age: 55
End: 2020-12-08

## 2020-12-08 DIAGNOSIS — I10 ESSENTIAL HYPERTENSION: ICD-10-CM

## 2020-12-08 NOTE — PROGRESS NOTES
Attempted to reach patient for routine follow up and follow up on valsartan dose increase. Reviewed available blood pressure readings and labs. Per 2017 ACC/AHA Hypertension Guidelines, current 30-day average is 123/80 and is at goal.     Last 5 Patient Entered Readings                                      Current 30 Day Average: 123/80     Recent Readings 12/6/2020 12/2/2020 11/26/2020 11/18/2020 11/15/2020    SBP (mmHg) 121 132 111 136 121    DBP (mmHg) 81 79 76 87 79    Pulse 86 93 91 85 86          BMP  Lab Results   Component Value Date     09/16/2020    K 4.1 09/16/2020     09/16/2020    CO2 25 09/16/2020    BUN 14 09/16/2020    CREATININE 0.8 09/16/2020    CALCIUM 8.6 (L) 09/16/2020    ANIONGAP 10 09/16/2020    ESTGFRAFRICA >60 09/16/2020    EGFRNONAA >60 09/16/2020       Left a voicemail and requested patient call back.    Will continue to monitor regularly. Will follow up in 1 week, sooner if blood pressure begins to trend up.    Kimberly Baca, PharmD  Digital Medicine Clinician  [phone]

## 2020-12-15 RX ORDER — VALSARTAN 320 MG/1
320 TABLET ORAL DAILY
Qty: 90 TABLET | Refills: 3
Start: 2020-12-15 | End: 2021-03-17

## 2020-12-15 NOTE — PROGRESS NOTES
Digital Medicine: Clinician Follow-Up    Called patient for routine follow up regarding HDMP (Hypertension Digital Medicine Program) and follow up on valsartan dose increase.     HPI  Patient's BP average is currently 127/81 mmHg.      The history is provided by the patient.      Review of patient's allergies indicates:   -- Latex, natural rubber -- Rash    --  With prolonged exposure, he will develop rash -             raised, red, itchy  Follow-up reason(s): medication change follow-up.     Hypertension    Readings are trending up due to inaccurate technique.    Patient is not experiencing signs/symptoms of hypotension.  Patient is not experiencing signs/symptoms of hypertension.      Additional Follow-up details: Patient increased valsartan to 320 mg on 11/24. He takes it every morning and reports no side effects or concerns.     Patient waits more than 12 hours after taking his valsartan before he checks his BP. He has been taking the valsartan at 6 am and checks his BP usually after 8 pm, likely the reason for his elevated readings.     Patient did make medication change.    Is patient tolerating med change? yes            Last 5 Patient Entered Readings                                      Current 30 Day Average: 127/81     Recent Readings 12/13/2020 12/6/2020 12/2/2020 11/26/2020 11/18/2020    SBP (mmHg) 140 121 132 111 136    DBP (mmHg) 83 81 79 76 87    Pulse 86 86 93 91 85                 Depression Screening  Did not address depression screening.    Sleep Apnea Screening    Did not address sleep apnea screening.     Medication Affordability Screening  Did not address medication affordability screening.     Medication Adherence-Medication adherence was assessed.          ASSESSMENT(S)  Patients BP average is 127/81 mmHg, which is above goal. Patient's BP goal is less than or equal to 130/80.     Hypertension Plan  Additional monitoring needed.  Continue current therapy. Informed patient to take his  valsartan in the evening or check his BP earlier than the evening.   Will call patient in a few weeks, sooner if needed. Patient knows to reach out at any time for any questions or concerns.        Addressed patient questions and patient has my contact information if needed prior to next outreach. Patient verbalizes understanding.             There are no preventive care reminders to display for this patient.  There are no preventive care reminders to display for this patient.      Hypertension Medications             valsartan (DIOVAN) 160 MG tablet Take 2 tablets (320 mg total) by mouth once daily.          Kimberly Baca, PharmD  Digital Medicine Clinician  [phone]

## 2020-12-24 ENCOUNTER — PATIENT OUTREACH (OUTPATIENT)
Dept: OTHER | Facility: OTHER | Age: 55
End: 2020-12-24

## 2020-12-24 NOTE — PROGRESS NOTES
Digital Medicine: Health  Follow-Up    The history is provided by the patient.             Reason for review: Blood pressure not at goal        Topics Covered on Call: Diet    Additional Follow-up details: Patient says he he feels fine today and denies any symptoms of hype or hypotension.  He says a couple of times he has had brief moments of dizziness.  He says he drank water and the dizziness resolved.  I reminded patient to charge his device regularly.            Diet-Change      Dietary Indiscretions:Patient says he has been indulging [place] candy.    Intervention(s): portion control      Physical Activity-Not assessed    Medication Adherence-Medication adherence was assessed.      Substance, Sleep, Stress-Not assessed      Continue current diet/physical activity routine.  Provided patient education.       Addressed patient questions and patient has my contact information if needed prior to next outreach. Patient verbalizes understanding.      Explained the importance of self-monitoring and medication adherence. Encouraged the patient to communicate with their health  for lifestyle modifications to help improve or maintain a healthy lifestyle.               There are no preventive care reminders to display for this patient.      Last 5 Patient Entered Readings                                      Current 30 Day Average: 127/82     Recent Readings 12/22/2020 12/15/2020 12/13/2020 12/6/2020 12/2/2020    SBP (mmHg) 134 126 140 121 132    DBP (mmHg) 87 88 83 81 79    Pulse 90 87 86 86 93

## 2021-01-06 ENCOUNTER — TELEPHONE (OUTPATIENT)
Dept: HEPATOLOGY | Facility: CLINIC | Age: 56
End: 2021-01-06

## 2021-01-08 ENCOUNTER — LAB VISIT (OUTPATIENT)
Dept: LAB | Facility: HOSPITAL | Age: 56
End: 2021-01-08
Attending: NURSE PRACTITIONER
Payer: COMMERCIAL

## 2021-01-08 DIAGNOSIS — R74.8 ELEVATED LIVER ENZYMES: ICD-10-CM

## 2021-01-08 LAB
ALBUMIN SERPL BCP-MCNC: 4.2 G/DL (ref 3.5–5.2)
ALP SERPL-CCNC: 82 U/L (ref 55–135)
ALT SERPL W/O P-5'-P-CCNC: 50 U/L (ref 10–44)
AST SERPL-CCNC: 34 U/L (ref 10–40)
BILIRUB DIRECT SERPL-MCNC: 0.2 MG/DL (ref 0.1–0.3)
BILIRUB SERPL-MCNC: 0.4 MG/DL (ref 0.1–1)
PROT SERPL-MCNC: 7.5 G/DL (ref 6–8.4)

## 2021-01-08 PROCEDURE — 80076 HEPATIC FUNCTION PANEL: CPT

## 2021-01-08 PROCEDURE — 36415 COLL VENOUS BLD VENIPUNCTURE: CPT | Mod: PO

## 2021-01-10 ENCOUNTER — PATIENT OUTREACH (OUTPATIENT)
Dept: ADMINISTRATIVE | Facility: OTHER | Age: 56
End: 2021-01-10

## 2021-01-11 ENCOUNTER — OFFICE VISIT (OUTPATIENT)
Dept: HEPATOLOGY | Facility: CLINIC | Age: 56
End: 2021-01-11
Payer: COMMERCIAL

## 2021-01-11 ENCOUNTER — TELEPHONE (OUTPATIENT)
Dept: HEPATOLOGY | Facility: CLINIC | Age: 56
End: 2021-01-11

## 2021-01-11 DIAGNOSIS — I10 ESSENTIAL HYPERTENSION: ICD-10-CM

## 2021-01-11 DIAGNOSIS — R74.8 ELEVATED LIVER ENZYMES: ICD-10-CM

## 2021-01-11 DIAGNOSIS — K76.0 NAFLD (NONALCOHOLIC FATTY LIVER DISEASE): Primary | ICD-10-CM

## 2021-01-11 PROCEDURE — 99213 OFFICE O/P EST LOW 20 MIN: CPT | Mod: 95,,, | Performed by: NURSE PRACTITIONER

## 2021-01-11 PROCEDURE — 99213 PR OFFICE/OUTPT VISIT, EST, LEVL III, 20-29 MIN: ICD-10-PCS | Mod: 95,,, | Performed by: NURSE PRACTITIONER

## 2021-03-17 ENCOUNTER — OFFICE VISIT (OUTPATIENT)
Dept: FAMILY MEDICINE | Facility: CLINIC | Age: 56
End: 2021-03-17
Payer: COMMERCIAL

## 2021-03-17 VITALS
SYSTOLIC BLOOD PRESSURE: 122 MMHG | DIASTOLIC BLOOD PRESSURE: 76 MMHG | HEIGHT: 71 IN | HEART RATE: 94 BPM | WEIGHT: 253.06 LBS | OXYGEN SATURATION: 97 % | BODY MASS INDEX: 35.43 KG/M2

## 2021-03-17 DIAGNOSIS — E66.9 OBESITY (BMI 30-39.9): ICD-10-CM

## 2021-03-17 DIAGNOSIS — Z00.00 ROUTINE PHYSICAL EXAMINATION: Primary | ICD-10-CM

## 2021-03-17 DIAGNOSIS — I10 ESSENTIAL HYPERTENSION: ICD-10-CM

## 2021-03-17 DIAGNOSIS — R09.82 PND (POST-NASAL DRIP): ICD-10-CM

## 2021-03-17 DIAGNOSIS — L98.9 SKIN LESION: ICD-10-CM

## 2021-03-17 PROCEDURE — 99999 PR PBB SHADOW E&M-EST. PATIENT-LVL IV: ICD-10-PCS | Mod: PBBFAC,,, | Performed by: INTERNAL MEDICINE

## 2021-03-17 PROCEDURE — 99396 PREV VISIT EST AGE 40-64: CPT | Mod: S$GLB,,, | Performed by: INTERNAL MEDICINE

## 2021-03-17 PROCEDURE — 99396 PR PREVENTIVE VISIT,EST,40-64: ICD-10-PCS | Mod: S$GLB,,, | Performed by: INTERNAL MEDICINE

## 2021-03-17 PROCEDURE — 99999 PR PBB SHADOW E&M-EST. PATIENT-LVL IV: CPT | Mod: PBBFAC,,, | Performed by: INTERNAL MEDICINE

## 2021-03-17 RX ORDER — PHENTERMINE HYDROCHLORIDE 37.5 MG/1
37.5 TABLET ORAL
Qty: 30 TABLET | Refills: 0 | Status: SHIPPED | OUTPATIENT
Start: 2021-03-17 | End: 2021-04-16

## 2021-03-17 RX ORDER — VALSARTAN 80 MG/1
160 TABLET ORAL DAILY
COMMUNITY
Start: 2021-03-04 | End: 2021-03-17

## 2021-03-17 RX ORDER — VALSARTAN 160 MG/1
240 TABLET ORAL DAILY
Qty: 135 TABLET | Refills: 3 | Status: SHIPPED | OUTPATIENT
Start: 2021-03-17 | End: 2021-07-13

## 2021-03-17 RX ORDER — FLUTICASONE PROPIONATE 50 MCG
2 SPRAY, SUSPENSION (ML) NASAL DAILY
Qty: 16 G | Refills: 11 | Status: SHIPPED | OUTPATIENT
Start: 2021-03-17 | End: 2022-03-14 | Stop reason: SDUPTHER

## 2021-03-18 DIAGNOSIS — E66.9 OBESITY (BMI 30-39.9): ICD-10-CM

## 2021-03-18 RX ORDER — PHENTERMINE HYDROCHLORIDE 30 MG/1
30 CAPSULE ORAL
Qty: 30 CAPSULE | Refills: 0 | OUTPATIENT
Start: 2021-03-18 | End: 2021-04-17

## 2021-04-21 ENCOUNTER — OFFICE VISIT (OUTPATIENT)
Dept: FAMILY MEDICINE | Facility: CLINIC | Age: 56
End: 2021-04-21
Payer: COMMERCIAL

## 2021-04-21 VITALS — SYSTOLIC BLOOD PRESSURE: 130 MMHG | BODY MASS INDEX: 33.33 KG/M2 | DIASTOLIC BLOOD PRESSURE: 80 MMHG | WEIGHT: 239 LBS

## 2021-04-21 DIAGNOSIS — E66.9 OBESITY (BMI 30-39.9): ICD-10-CM

## 2021-04-21 DIAGNOSIS — I10 ESSENTIAL HYPERTENSION: Primary | ICD-10-CM

## 2021-04-21 PROCEDURE — 99214 OFFICE O/P EST MOD 30 MIN: CPT | Mod: 95,,, | Performed by: INTERNAL MEDICINE

## 2021-04-21 PROCEDURE — 99214 PR OFFICE/OUTPT VISIT, EST, LEVL IV, 30-39 MIN: ICD-10-PCS | Mod: 95,,, | Performed by: INTERNAL MEDICINE

## 2021-04-21 RX ORDER — PHENTERMINE HYDROCHLORIDE 37.5 MG/1
37.5 TABLET ORAL
Qty: 30 TABLET | Refills: 0 | Status: SHIPPED | OUTPATIENT
Start: 2021-04-21 | End: 2021-05-19 | Stop reason: SDUPTHER

## 2021-05-04 ENCOUNTER — PATIENT MESSAGE (OUTPATIENT)
Dept: RESEARCH | Facility: HOSPITAL | Age: 56
End: 2021-05-04

## 2021-05-19 ENCOUNTER — OFFICE VISIT (OUTPATIENT)
Dept: FAMILY MEDICINE | Facility: CLINIC | Age: 56
End: 2021-05-19
Payer: COMMERCIAL

## 2021-05-19 VITALS — SYSTOLIC BLOOD PRESSURE: 129 MMHG | WEIGHT: 224 LBS | BODY MASS INDEX: 31.24 KG/M2 | DIASTOLIC BLOOD PRESSURE: 82 MMHG

## 2021-05-19 DIAGNOSIS — I10 ESSENTIAL HYPERTENSION: Primary | ICD-10-CM

## 2021-05-19 DIAGNOSIS — E66.9 OBESITY (BMI 30-39.9): ICD-10-CM

## 2021-05-19 DIAGNOSIS — K75.81 NASH (NONALCOHOLIC STEATOHEPATITIS): ICD-10-CM

## 2021-05-19 PROCEDURE — 99214 PR OFFICE/OUTPT VISIT, EST, LEVL IV, 30-39 MIN: ICD-10-PCS | Mod: 95,,, | Performed by: INTERNAL MEDICINE

## 2021-05-19 PROCEDURE — 99214 OFFICE O/P EST MOD 30 MIN: CPT | Mod: 95,,, | Performed by: INTERNAL MEDICINE

## 2021-05-19 RX ORDER — PHENTERMINE HYDROCHLORIDE 37.5 MG/1
37.5 TABLET ORAL
Qty: 30 TABLET | Refills: 0 | Status: SHIPPED | OUTPATIENT
Start: 2021-05-19 | End: 2021-06-18

## 2021-05-28 ENCOUNTER — TELEPHONE (OUTPATIENT)
Dept: FAMILY MEDICINE | Facility: CLINIC | Age: 56
End: 2021-05-28

## 2021-05-28 ENCOUNTER — CLINICAL SUPPORT (OUTPATIENT)
Dept: FAMILY MEDICINE | Facility: CLINIC | Age: 56
End: 2021-05-28
Payer: COMMERCIAL

## 2021-05-28 VITALS — SYSTOLIC BLOOD PRESSURE: 132 MMHG | OXYGEN SATURATION: 96 % | DIASTOLIC BLOOD PRESSURE: 80 MMHG | HEART RATE: 74 BPM

## 2021-05-28 DIAGNOSIS — I10 ESSENTIAL HYPERTENSION: Primary | ICD-10-CM

## 2021-05-28 PROCEDURE — 99999 PR PBB SHADOW E&M-EST. PATIENT-LVL III: CPT | Mod: PBBFAC,,,

## 2021-05-28 PROCEDURE — 99499 UNLISTED E&M SERVICE: CPT | Mod: S$GLB,,, | Performed by: INTERNAL MEDICINE

## 2021-05-28 PROCEDURE — 99499 NO LOS: ICD-10-PCS | Mod: S$GLB,,, | Performed by: INTERNAL MEDICINE

## 2021-05-28 PROCEDURE — 99999 PR PBB SHADOW E&M-EST. PATIENT-LVL III: ICD-10-PCS | Mod: PBBFAC,,,

## 2021-05-31 ENCOUNTER — PATIENT MESSAGE (OUTPATIENT)
Dept: FAMILY MEDICINE | Facility: CLINIC | Age: 56
End: 2021-05-31

## 2021-12-23 DIAGNOSIS — N52.9 ERECTILE DYSFUNCTION, UNSPECIFIED ERECTILE DYSFUNCTION TYPE: ICD-10-CM

## 2021-12-23 RX ORDER — TADALAFIL 20 MG/1
TABLET ORAL
Qty: 30 TABLET | Refills: 11 | Status: SHIPPED | OUTPATIENT
Start: 2021-12-23 | End: 2022-10-24 | Stop reason: SDUPTHER

## 2022-02-09 ENCOUNTER — LAB VISIT (OUTPATIENT)
Dept: LAB | Facility: HOSPITAL | Age: 57
End: 2022-02-09
Attending: INTERNAL MEDICINE

## 2022-02-09 DIAGNOSIS — I10 ESSENTIAL HYPERTENSION: ICD-10-CM

## 2022-02-09 LAB
ANION GAP SERPL CALC-SCNC: 9 MMOL/L (ref 8–16)
BUN SERPL-MCNC: 14 MG/DL (ref 6–20)
CALCIUM SERPL-MCNC: 9.2 MG/DL (ref 8.7–10.5)
CHLORIDE SERPL-SCNC: 103 MMOL/L (ref 95–110)
CO2 SERPL-SCNC: 25 MMOL/L (ref 23–29)
CREAT SERPL-MCNC: 0.8 MG/DL (ref 0.5–1.4)
EST. GFR  (AFRICAN AMERICAN): >60 ML/MIN/1.73 M^2
EST. GFR  (NON AFRICAN AMERICAN): >60 ML/MIN/1.73 M^2
GLUCOSE SERPL-MCNC: 92 MG/DL (ref 70–110)
POTASSIUM SERPL-SCNC: 4.1 MMOL/L (ref 3.5–5.1)
SODIUM SERPL-SCNC: 137 MMOL/L (ref 136–145)

## 2022-02-09 PROCEDURE — 36415 COLL VENOUS BLD VENIPUNCTURE: CPT | Mod: PO | Performed by: INTERNAL MEDICINE

## 2022-02-09 PROCEDURE — 80048 BASIC METABOLIC PNL TOTAL CA: CPT | Performed by: INTERNAL MEDICINE

## 2022-02-28 ENCOUNTER — TELEPHONE (OUTPATIENT)
Dept: CARDIOLOGY | Facility: CLINIC | Age: 57
End: 2022-02-28

## 2022-02-28 NOTE — TELEPHONE ENCOUNTER
Spoke with patient and wife. Patient wanted to see Dr Menjivar. Explained Dr Menjivar next available is not until 3-28. Offered and appointment with Dr Snow 3-3. Patient said he would call back to schedule.

## 2022-02-28 NOTE — TELEPHONE ENCOUNTER
----- Message from Kacy Flip sent at 2/28/2022 10:06 AM CST -----  Contact: pt's wife Aminta at [phone]  Type:  Sooner Apoointment Request    Caller is requesting a sooner appointment.  Caller declined first available appointment listed below.  Caller will not accept being placed on the waitlist and is requesting a message be sent to doctor.    Name of Caller:  pt's wife Aminta   When is the first available appointment?  June  Symptoms:  hole in heart  Best Call Back Number: [phone]  Additional Information:  pt's wife Aminta is calling the office to have the pt seen this week as he was just released from the hospital last week and the pt would like to see Dr Leigh as the pt's mother was a pt of the  but no appts come up available until June. Please call back and advise.
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
## 2022-03-02 ENCOUNTER — TELEPHONE (OUTPATIENT)
Dept: VASCULAR SURGERY | Facility: CLINIC | Age: 57
End: 2022-03-02
Payer: COMMERCIAL

## 2022-03-02 NOTE — TELEPHONE ENCOUNTER
----- Message from Opal Harris sent at 3/2/2022  3:31 PM CST -----  Regarding: Appt  Contact: [phone] or [phone]  Aminta calling. Patient was recently d/c from [facility]. Patient has a blood clot in the leg and a hole in his heart. The hole in the heart may have caused a light stroke. Would like to see Dr. Allen. Several of the patients physician javier suggested Dr Allen. I did inform the patient that  doesn't see Dvt but would still like to speak with the office in ref to other problems. Please call patient at [phone] or [phone]      Thank You

## 2022-03-09 ENCOUNTER — TELEPHONE (OUTPATIENT)
Dept: VASCULAR SURGERY | Facility: CLINIC | Age: 57
End: 2022-03-09
Payer: COMMERCIAL

## 2022-03-09 NOTE — TELEPHONE ENCOUNTER
----- Message from Erika Rogers sent at 3/9/2022  7:22 AM CST -----  Regarding: advice  Contact: Wife/Christina/[phone]  Type: Needs Medical Advice  Who Called:  Wife/Christina/[phone]    Additional Information: Patient would like to get an appointment as soon as possible due to a [facility] DX w/clot, hole in the heart, dvt and they think that this was the reason for the stroke. He was hospitalized at [facility]. Please call to advise.

## 2022-03-16 ENCOUNTER — OFFICE VISIT (OUTPATIENT)
Dept: FAMILY MEDICINE | Facility: CLINIC | Age: 57
End: 2022-03-16
Payer: COMMERCIAL

## 2022-03-16 VITALS
DIASTOLIC BLOOD PRESSURE: 86 MMHG | HEIGHT: 71 IN | TEMPERATURE: 98 F | SYSTOLIC BLOOD PRESSURE: 122 MMHG | OXYGEN SATURATION: 98 % | HEART RATE: 79 BPM | BODY MASS INDEX: 34.19 KG/M2 | WEIGHT: 244.25 LBS

## 2022-03-16 DIAGNOSIS — Z09 HOSPITAL DISCHARGE FOLLOW-UP: Primary | ICD-10-CM

## 2022-03-16 DIAGNOSIS — R42 DIZZINESS: ICD-10-CM

## 2022-03-16 DIAGNOSIS — I82.401 ACUTE DEEP VEIN THROMBOSIS (DVT) OF RIGHT LOWER EXTREMITY, UNSPECIFIED VEIN: ICD-10-CM

## 2022-03-16 DIAGNOSIS — R93.89 ABNORMAL MRI: ICD-10-CM

## 2022-03-16 PROCEDURE — 3074F PR MOST RECENT SYSTOLIC BLOOD PRESSURE < 130 MM HG: ICD-10-PCS | Mod: CPTII,S$GLB,, | Performed by: INTERNAL MEDICINE

## 2022-03-16 PROCEDURE — 3008F PR BODY MASS INDEX (BMI) DOCUMENTED: ICD-10-PCS | Mod: CPTII,S$GLB,, | Performed by: INTERNAL MEDICINE

## 2022-03-16 PROCEDURE — 1160F RVW MEDS BY RX/DR IN RCRD: CPT | Mod: CPTII,S$GLB,, | Performed by: INTERNAL MEDICINE

## 2022-03-16 PROCEDURE — 3008F BODY MASS INDEX DOCD: CPT | Mod: CPTII,S$GLB,, | Performed by: INTERNAL MEDICINE

## 2022-03-16 PROCEDURE — 3074F SYST BP LT 130 MM HG: CPT | Mod: CPTII,S$GLB,, | Performed by: INTERNAL MEDICINE

## 2022-03-16 PROCEDURE — 1159F MED LIST DOCD IN RCRD: CPT | Mod: CPTII,S$GLB,, | Performed by: INTERNAL MEDICINE

## 2022-03-16 PROCEDURE — 99215 OFFICE O/P EST HI 40 MIN: CPT | Mod: S$GLB,,, | Performed by: INTERNAL MEDICINE

## 2022-03-16 PROCEDURE — 3079F PR MOST RECENT DIASTOLIC BLOOD PRESSURE 80-89 MM HG: ICD-10-PCS | Mod: CPTII,S$GLB,, | Performed by: INTERNAL MEDICINE

## 2022-03-16 PROCEDURE — 3079F DIAST BP 80-89 MM HG: CPT | Mod: CPTII,S$GLB,, | Performed by: INTERNAL MEDICINE

## 2022-03-16 PROCEDURE — 99999 PR PBB SHADOW E&M-EST. PATIENT-LVL IV: CPT | Mod: PBBFAC,,, | Performed by: INTERNAL MEDICINE

## 2022-03-16 PROCEDURE — 99999 PR PBB SHADOW E&M-EST. PATIENT-LVL IV: ICD-10-PCS | Mod: PBBFAC,,, | Performed by: INTERNAL MEDICINE

## 2022-03-16 PROCEDURE — 99215 PR OFFICE/OUTPT VISIT, EST, LEVL V, 40-54 MIN: ICD-10-PCS | Mod: S$GLB,,, | Performed by: INTERNAL MEDICINE

## 2022-03-16 PROCEDURE — 1160F PR REVIEW ALL MEDS BY PRESCRIBER/CLIN PHARMACIST DOCUMENTED: ICD-10-PCS | Mod: CPTII,S$GLB,, | Performed by: INTERNAL MEDICINE

## 2022-03-16 PROCEDURE — 1159F PR MEDICATION LIST DOCUMENTED IN MEDICAL RECORD: ICD-10-PCS | Mod: CPTII,S$GLB,, | Performed by: INTERNAL MEDICINE

## 2022-03-16 RX ORDER — NAPROXEN SODIUM 220 MG/1
1 TABLET, FILM COATED ORAL DAILY
COMMUNITY
Start: 2022-02-22 | End: 2023-12-12

## 2022-03-16 RX ORDER — ATORVASTATIN CALCIUM 40 MG/1
40 TABLET, FILM COATED ORAL
COMMUNITY
Start: 2022-02-22 | End: 2022-06-27

## 2022-03-16 NOTE — PROGRESS NOTES
"Subjective:       Patient ID: Lorenzo Cross is a 56 y.o. male.    Chief Complaint: Hospital Follow Up    Hospital follow up.    Patient drove to [place] in a truck moving his sister-in-law.  Right after, he developed severe vertigo. Intermittent lasting a few minutes each time one morning when waking.  "everything was spinning."  + nausea.  Improved through the day.  Next day, felt he was moving very slow and did not feel good.  Still had dizziness.  Went to urgent care, who sent to ER.  Found to have a DVT, new.  CHRISTIANA done at [facility] - Dr Gustafson told him he had a hole in his heart; MRI done showed a stroke via ER Dr.  Neurologist came in and did not feel it was a stroke; she felt it was related to artifact.    Neurology did not do Dixx-Halpike maneuver.   Meclizine helped.    Went to [facility].   Went to 2 other cardiologist.  They did not see a patent foramen oval on review.  Dr Acosta's NP and [facility] cardiologist.  Dr Acosta scheduled him to do a calcium CT score, halter monitor, and maybe another CHRISTIANA.  Want to r/o Afib.    Will see neurology on f/u at end of this month.    Patient researched on his own and started doing the Eply maneuver on his own and this has improved his dizziness.      DVT - new; on Eliquis    HTN - controlled. His cuff runs sbp 10 higher.  He wants to check again.  PND - controlled with Flonase and OTC  Obesity - poor diet  LUIS - losing weight      Review of Systems   Constitutional: Negative for appetite change and fever.   HENT: Negative for nosebleeds and trouble swallowing.    Eyes: Negative for discharge and visual disturbance.   Respiratory: Negative for choking and shortness of breath.    Cardiovascular: Negative for chest pain and palpitations.   Gastrointestinal: Negative for abdominal pain, nausea and vomiting.   Musculoskeletal: Negative for arthralgias and joint swelling.   Skin: Negative for rash and wound.   Neurological: Positive for dizziness. Negative for syncope. "   Psychiatric/Behavioral: Negative for confusion and dysphoric mood.       Objective:      Vitals:    03/16/22 0950   BP: 122/86   Pulse: 79   Temp: 98.2 °F (36.8 °C)     Physical Exam  Vitals reviewed.   Eyes:      Conjunctiva/sclera: Conjunctivae normal.   Cardiovascular:      Rate and Rhythm: Normal rate and regular rhythm.   Pulmonary:      Effort: Pulmonary effort is normal.      Breath sounds: Normal breath sounds.   Musculoskeletal:      Cervical back: Normal range of motion.      Comments: Normal ROM bilateral    Skin:     General: Skin is warm and dry.   Neurological:      Cranial Nerves: No cranial nerve deficit (grossly intact).      Comments: Dixx-Halpike produced significant dizziness on right side.  Only slight fleeting on left    Psychiatric:      Comments: Alert and orientated           Assessment:       1. Hospital discharge follow-up    2. Acute deep vein thrombosis (DVT) of right lower extremity, unspecified vein    3. Dizziness    4. Abnormal MRI        Plan:       Hospital discharge follow-up    Acute deep vein thrombosis (DVT) of right lower extremity, unspecified vein    Dizziness  -     MRI Brain Without Contrast; Future; Expected date: 03/16/2022    Abnormal MRI  -     MRI Brain Without Contrast; Future; Expected date: 03/16/2022            Medication List with Changes/Refills   Current Medications    APIXABAN (ELIQUIS) 5 MG TAB    Take 5 mg by mouth.    ASPIRIN 81 MG CHEW    Take 1 tablet by mouth once daily.    ATORVASTATIN (LIPITOR) 40 MG TABLET    Take 40 mg by mouth.    FLUTICASONE PROPIONATE (FLONASE) 50 MCG/ACTUATION NASAL SPRAY    2 sprays (100 mcg total) by Each Nostril route once daily.    HYDROCHLOROTHIAZIDE (HYDRODIURIL) 25 MG TABLET    Take 1 tablet (25 mg total) by mouth once daily.    TADALAFIL (CIALIS) 20 MG TAB    Take one tablet by mouth every 36 hours as needed.     Likely BPPV, but MRI abnormality need to be ruled out/confirmed.  He will bring disc to neurologist.  If MRI  wnl, BPPV.  Seems to be improving with Eply, so continue and this also points to BPPV.  Has apt with Dr Myers in few day, who can way in.    Continue current management and monitor.    Counseled on regular exercise, maintenance of a healthy weight, balanced diet rich in fruits/vegetables and lean protein, and avoidance of unhealthy habits like smoking and excessive alcohol intake.   Also, counseled on importance of being compliant with medication, health appointments, diet and exercise.     Follow up in about 14 weeks (around 6/22/2022).     Total time spent on patient's needs today in preparing for the visit, the actual visit including counseling, managing the patient's needs and electronic record documentation was more than 40 minutes

## 2022-03-25 ENCOUNTER — HOSPITAL ENCOUNTER (OUTPATIENT)
Dept: RADIOLOGY | Facility: HOSPITAL | Age: 57
Discharge: HOME | End: 2022-03-25
Attending: INTERNAL MEDICINE
Payer: COMMERCIAL

## 2022-03-25 DIAGNOSIS — R42 DIZZINESS: ICD-10-CM

## 2022-03-25 DIAGNOSIS — R93.89 ABNORMAL MRI: ICD-10-CM

## 2022-03-25 PROCEDURE — 70551 MRI BRAIN STEM W/O DYE: CPT | Mod: TC,PO

## 2022-03-25 PROCEDURE — 70551 MRI BRAIN STEM W/O DYE: CPT | Mod: 26,,, | Performed by: RADIOLOGY

## 2022-03-25 PROCEDURE — 70551 MRI BRAIN WITHOUT CONTRAST: ICD-10-PCS | Mod: 26,,, | Performed by: RADIOLOGY

## 2022-03-27 DIAGNOSIS — R09.82 PND (POST-NASAL DRIP): ICD-10-CM

## 2022-03-27 RX ORDER — FLUTICASONE PROPIONATE 50 MCG
2 SPRAY, SUSPENSION (ML) NASAL DAILY
Qty: 16 G | Refills: 11 | Status: CANCELLED | OUTPATIENT
Start: 2022-03-27

## 2022-03-27 NOTE — TELEPHONE ENCOUNTER
No new care gaps identified.  Powered by [employer]. Reference number: [id number].   3/27/2022 9:16:12 AM CDT

## 2022-03-28 ENCOUNTER — PATIENT MESSAGE (OUTPATIENT)
Dept: FAMILY MEDICINE | Facility: CLINIC | Age: 57
End: 2022-03-28
Payer: COMMERCIAL

## 2022-03-29 ENCOUNTER — TELEPHONE (OUTPATIENT)
Dept: FAMILY MEDICINE | Facility: CLINIC | Age: 57
End: 2022-03-29
Payer: COMMERCIAL

## 2022-03-29 ENCOUNTER — PATIENT MESSAGE (OUTPATIENT)
Dept: FAMILY MEDICINE | Facility: CLINIC | Age: 57
End: 2022-03-29
Payer: COMMERCIAL

## 2022-03-29 NOTE — TELEPHONE ENCOUNTER
----- Message from Isha Spivey sent at 3/29/2022  1:55 PM CDT -----  Contact: pt  Type:  Patient Returning Call    Who Called:  pt  Who Left Message for Patient:  nurse  Does the patient know what this is regarding? no  Best Call Back Number: [phone]   Additional Information: pt missed call and ask to be called back please

## 2022-03-29 NOTE — TELEPHONE ENCOUNTER
----- Message from Nancy Blanco MA sent at 3/29/2022  9:45 AM CDT -----  Type: Needs Medical Advice  Who Called:  Lorenzo  Mendel Call Back Number: [phone]  Additional Information: patient is requesting that his MRI results be sent to Dr Singh.

## 2022-05-09 ENCOUNTER — PATIENT MESSAGE (OUTPATIENT)
Dept: SMOKING CESSATION | Facility: CLINIC | Age: 57
End: 2022-05-09
Payer: COMMERCIAL

## 2022-06-26 DIAGNOSIS — R09.82 PND (POST-NASAL DRIP): ICD-10-CM

## 2022-06-26 RX ORDER — FLUTICASONE PROPIONATE 50 MCG
2 SPRAY, SUSPENSION (ML) NASAL DAILY
Qty: 16 G | Refills: 11 | Status: CANCELLED | OUTPATIENT
Start: 2022-06-26

## 2022-06-27 ENCOUNTER — PATIENT MESSAGE (OUTPATIENT)
Dept: FAMILY MEDICINE | Facility: CLINIC | Age: 57
End: 2022-06-27
Payer: COMMERCIAL

## 2022-06-27 ENCOUNTER — OFFICE VISIT (OUTPATIENT)
Dept: FAMILY MEDICINE | Facility: CLINIC | Age: 57
End: 2022-06-27
Payer: COMMERCIAL

## 2022-06-27 VITALS
WEIGHT: 250 LBS | BODY MASS INDEX: 35 KG/M2 | DIASTOLIC BLOOD PRESSURE: 78 MMHG | HEIGHT: 71 IN | OXYGEN SATURATION: 96 % | SYSTOLIC BLOOD PRESSURE: 118 MMHG | HEART RATE: 96 BPM

## 2022-06-27 DIAGNOSIS — E66.9 OBESITY (BMI 30-39.9): ICD-10-CM

## 2022-06-27 DIAGNOSIS — I10 ESSENTIAL HYPERTENSION: ICD-10-CM

## 2022-06-27 DIAGNOSIS — Z00.00 ROUTINE PHYSICAL EXAMINATION: Primary | ICD-10-CM

## 2022-06-27 DIAGNOSIS — R09.82 PND (POST-NASAL DRIP): ICD-10-CM

## 2022-06-27 DIAGNOSIS — M62.830 BACK SPASM: ICD-10-CM

## 2022-06-27 PROCEDURE — 1160F RVW MEDS BY RX/DR IN RCRD: CPT | Mod: CPTII,S$GLB,, | Performed by: INTERNAL MEDICINE

## 2022-06-27 PROCEDURE — 1159F MED LIST DOCD IN RCRD: CPT | Mod: CPTII,S$GLB,, | Performed by: INTERNAL MEDICINE

## 2022-06-27 PROCEDURE — 3078F DIAST BP <80 MM HG: CPT | Mod: CPTII,S$GLB,, | Performed by: INTERNAL MEDICINE

## 2022-06-27 PROCEDURE — 3074F SYST BP LT 130 MM HG: CPT | Mod: CPTII,S$GLB,, | Performed by: INTERNAL MEDICINE

## 2022-06-27 PROCEDURE — 1159F PR MEDICATION LIST DOCUMENTED IN MEDICAL RECORD: ICD-10-PCS | Mod: CPTII,S$GLB,, | Performed by: INTERNAL MEDICINE

## 2022-06-27 PROCEDURE — 99396 PREV VISIT EST AGE 40-64: CPT | Mod: S$GLB,,, | Performed by: INTERNAL MEDICINE

## 2022-06-27 PROCEDURE — 1160F PR REVIEW ALL MEDS BY PRESCRIBER/CLIN PHARMACIST DOCUMENTED: ICD-10-PCS | Mod: CPTII,S$GLB,, | Performed by: INTERNAL MEDICINE

## 2022-06-27 PROCEDURE — 99999 PR PBB SHADOW E&M-EST. PATIENT-LVL IV: CPT | Mod: PBBFAC,,, | Performed by: INTERNAL MEDICINE

## 2022-06-27 PROCEDURE — 3074F PR MOST RECENT SYSTOLIC BLOOD PRESSURE < 130 MM HG: ICD-10-PCS | Mod: CPTII,S$GLB,, | Performed by: INTERNAL MEDICINE

## 2022-06-27 PROCEDURE — 3078F PR MOST RECENT DIASTOLIC BLOOD PRESSURE < 80 MM HG: ICD-10-PCS | Mod: CPTII,S$GLB,, | Performed by: INTERNAL MEDICINE

## 2022-06-27 PROCEDURE — 3008F PR BODY MASS INDEX (BMI) DOCUMENTED: ICD-10-PCS | Mod: CPTII,S$GLB,, | Performed by: INTERNAL MEDICINE

## 2022-06-27 PROCEDURE — 3008F BODY MASS INDEX DOCD: CPT | Mod: CPTII,S$GLB,, | Performed by: INTERNAL MEDICINE

## 2022-06-27 PROCEDURE — 99999 PR PBB SHADOW E&M-EST. PATIENT-LVL IV: ICD-10-PCS | Mod: PBBFAC,,, | Performed by: INTERNAL MEDICINE

## 2022-06-27 PROCEDURE — 99396 PR PREVENTIVE VISIT,EST,40-64: ICD-10-PCS | Mod: S$GLB,,, | Performed by: INTERNAL MEDICINE

## 2022-06-27 RX ORDER — PHENTERMINE HYDROCHLORIDE 37.5 MG/1
37.5 TABLET ORAL
Qty: 30 TABLET | Refills: 0 | Status: SHIPPED | OUTPATIENT
Start: 2022-06-27 | End: 2022-07-27

## 2022-06-27 RX ORDER — CYCLOBENZAPRINE HCL 10 MG
10 TABLET ORAL NIGHTLY PRN
Qty: 30 TABLET | Refills: 5 | Status: SHIPPED | OUTPATIENT
Start: 2022-06-27 | End: 2022-07-27

## 2022-06-27 RX ORDER — METHOCARBAMOL 750 MG/1
750 TABLET, FILM COATED ORAL 4 TIMES DAILY PRN
Qty: 40 TABLET | Refills: 1 | Status: SHIPPED | OUTPATIENT
Start: 2022-06-27 | End: 2022-07-07

## 2022-06-27 RX ORDER — FLUTICASONE PROPIONATE 50 MCG
2 SPRAY, SUSPENSION (ML) NASAL DAILY
Qty: 16 G | Refills: 11 | Status: SHIPPED | OUTPATIENT
Start: 2022-06-27 | End: 2022-06-30 | Stop reason: SDUPTHER

## 2022-06-27 NOTE — PROGRESS NOTES
Subjective:       Patient ID: Lorenzo Cross is a 57 y.o. male.    Chief Complaint: Annual Exam    Here for routine health maintenance.    Saw Dr Acosta: CT calcium score 50.  Did not feel had whole in septum heart.  MRI not CVA.    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  Poor diet.  Would like help    Complains of intermittent low back spasm.  Leans forward at restaurant a lot.      DVT - on Eliquis for 3 of 6 months. Dr Acosta     HTN - controlled off medicine. His cuff runs sbp 10 higher.  He wants to check again.    PND - controlled with Flonase and OTC    LUIS - wants to lose weight      Just saw derm and had lesion frozen    Review of Systems   Constitutional: Negative for appetite change and fever.   HENT: Negative for nosebleeds and trouble swallowing.    Eyes: Negative for discharge and visual disturbance.   Respiratory: Negative for choking and shortness of breath.    Cardiovascular: Negative for chest pain and palpitations.   Gastrointestinal: Negative for abdominal pain, nausea and vomiting.   Musculoskeletal: Negative for arthralgias and joint swelling.   Skin: Negative for rash and wound.   Neurological: Negative for dizziness and syncope.   Psychiatric/Behavioral: Negative for confusion and dysphoric mood.       Objective:      Vitals:    06/27/22 1334   BP: 118/78   Pulse: 96     Physical Exam  Vitals reviewed.   Eyes:      Conjunctiva/sclera: Conjunctivae normal.   Neck:      Thyroid: No thyromegaly.      Trachea: Trachea normal.   Cardiovascular:      Heart sounds: Normal heart sounds.      Comments: Edema negative  Pulmonary:      Effort: Pulmonary effort is normal.      Breath sounds: Normal breath sounds.   Abdominal:      Palpations: Abdomen is soft. There is no hepatomegaly.   Musculoskeletal:      Cervical back: Normal range of motion.      Comments: ROM normal bilateral  Strength normal bilateral   Skin:     General: Skin is warm and dry.   Neurological:      Cranial  Nerves: No cranial nerve deficit.      Deep Tendon Reflexes: Reflexes are normal and symmetric.   Psychiatric:      Comments: Alert and Oriented            Assessment:       1. Routine physical examination    2. Essential hypertension    3. PND (post-nasal drip)    4. Obesity (BMI 30-39.9)    5. Back spasm        Plan:       Routine physical examination    Essential hypertension    PND (post-nasal drip)  -     fluticasone propionate (FLONASE) 50 mcg/actuation nasal spray; 2 sprays (100 mcg total) by Each Nostril route once daily.  Dispense: 16 g; Refill: 11    Obesity (BMI 30-39.9)  -     phentermine (ADIPEX-P) 37.5 mg tablet; Take 1 tablet (37.5 mg total) by mouth before breakfast.  Dispense: 30 tablet; Refill: 0    Back spasm  -     methocarbamoL (ROBAXIN) 750 MG Tab; Take 1 tablet (750 mg total) by mouth 4 (four) times daily as needed.  Dispense: 40 tablet; Refill: 1  -     cyclobenzaprine (FLEXERIL) 10 MG tablet; Take 1 tablet (10 mg total) by mouth nightly as needed for Muscle spasms.  Dispense: 30 tablet; Refill: 5  -     Ambulatory referral/consult to Physical/Occupational Therapy; Future; Expected date: 07/04/2022            Medication List with Changes/Refills   New Medications    CYCLOBENZAPRINE (FLEXERIL) 10 MG TABLET    Take 1 tablet (10 mg total) by mouth nightly as needed for Muscle spasms.    METHOCARBAMOL (ROBAXIN) 750 MG TAB    Take 1 tablet (750 mg total) by mouth 4 (four) times daily as needed.    PHENTERMINE (ADIPEX-P) 37.5 MG TABLET    Take 1 tablet (37.5 mg total) by mouth before breakfast.   Current Medications    APIXABAN (ELIQUIS) 5 MG TAB    Eliquis 5 mg tablet   TAKE 1 TABLET BY MOUTH TWICE DAILY    ASPIRIN 81 MG CHEW    Take 1 tablet by mouth once daily.    TADALAFIL (CIALIS) 20 MG TAB    Take one tablet by mouth every 36 hours as needed.   Changed and/or Refilled Medications    Modified Medication Previous Medication    FLUTICASONE PROPIONATE (FLONASE) 50 MCG/ACTUATION NASAL SPRAY  fluticasone propionate (FLONASE) 50 mcg/actuation nasal spray       2 sprays (100 mcg total) by Each Nostril route once daily.    2 sprays (100 mcg total) by Each Nostril route once daily.   Discontinued Medications    ATORVASTATIN (LIPITOR) 40 MG TABLET    Take 40 mg by mouth.     Wellness reviewed  1/3   Continue current management and monitor.    Counseled on regular exercise, maintenance of a healthy weight, balanced diet rich in fruits/vegetables and lean protein, and avoidance of unhealthy habits like smoking and excessive alcohol intake.   Also, counseled on importance of being compliant with medication, health appointments, diet and exercise.     Follow up in about 4 weeks (around 7/25/2022).

## 2022-06-27 NOTE — TELEPHONE ENCOUNTER
No new care gaps identified.  [facility] Embedded Care Gaps. Reference number: [id number]. 6/26/2022   9:52:31 PM CDT

## 2022-06-27 NOTE — TELEPHONE ENCOUNTER
Pt is not able to do appt on time and day provided but can do virtual on in daily change spot on 8/2 at 2:30. Would that day and time be okay to schedule pt?

## 2022-06-28 ENCOUNTER — TELEPHONE (OUTPATIENT)
Dept: FAMILY MEDICINE | Facility: CLINIC | Age: 57
End: 2022-06-28
Payer: COMMERCIAL

## 2022-06-30 ENCOUNTER — CLINICAL SUPPORT (OUTPATIENT)
Dept: REHABILITATION | Facility: HOSPITAL | Age: 57
End: 2022-06-30
Payer: COMMERCIAL

## 2022-06-30 ENCOUNTER — PATIENT MESSAGE (OUTPATIENT)
Dept: FAMILY MEDICINE | Facility: CLINIC | Age: 57
End: 2022-06-30
Payer: COMMERCIAL

## 2022-06-30 DIAGNOSIS — M62.830 BACK SPASM: ICD-10-CM

## 2022-06-30 DIAGNOSIS — R68.89 DECREASED FUNCTIONAL ACTIVITY TOLERANCE: ICD-10-CM

## 2022-06-30 DIAGNOSIS — M53.86 DECREASED ROM OF LUMBAR SPINE: ICD-10-CM

## 2022-06-30 DIAGNOSIS — R29.898 DECREASED STRENGTH OF LOWER EXTREMITY: ICD-10-CM

## 2022-06-30 PROCEDURE — 97161 PT EVAL LOW COMPLEX 20 MIN: CPT | Mod: PN

## 2022-06-30 PROCEDURE — 97110 THERAPEUTIC EXERCISES: CPT | Mod: PN

## 2022-06-30 NOTE — PLAN OF CARE
[facility]   Physical Therapy Initial Evaluation     Date: 6/30/2022   Name: Lorenzo Cross  Clinic Number: [id number]    Therapy Diagnosis:   Encounter Diagnoses   Name Primary?    Back spasm     Decreased strength of lower extremity     Decreased ROM of lumbar spine     Decreased functional activity tolerance      Physician: Clifford Sotelo MD    Physician Orders: PT Eval and Treat  Medical Diagnosis from Referral: M62.830 (ICD-10-CM) - Back spasm  Evaluation Date: 6/30/2022  Authorization Period Expiration: 6/27/23  Plan of Care Expiration: 8/30/22  Progress Note Due: 7/30/22  Visit # / Visits authorized: (1 / 1 Eval) Need Auth  FOTO: 1 / 3 (6/30/22 - IE)    Precautions: Essential HTN     Time In: 3:00 PM  Time Out: 3:45 PM  Total Appointment Time (timed & untimed codes): 45 minutes    SUBJECTIVE     Date of onset: about 1-2 months ago    History of current condition - Anthony reports: that he has been dealing with muscle tightness in the lower back for the last month or two. 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 He has been helping out a lot and there is concrete floors. He has been wearing Cowboy boots but just bought a new pair of [place]oka tennis shoes to try and help with his symptoms. The pain is better overall as compared to when it started. He has been stretching and rocking the pelvis/back which does help. He also has an inversion table and that also seems to help. The chiropractor has adjusted him several times in the past and that helps as well. The right knee has a torn MCL and ACL that was diagnosed about 2 years ago via MRI - no surgery though. No prior back surgeries in the past and no numbness or tingling present. He had a deep tissue massage a couple weeks ago and that helped a lot. He was prescribed a muscle relaxer and takes that at night. He was using a gel ice pack at night to help as well. No other medical conditions  to be concerned with at this time. No bowel or bladder concerns.    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   Prior Level of Function: able to perform all ADL's without any pain/problems  Current Level of Function: difficulty working long hours and standing for long periods    Pain:  Current 6/10, worst 8/10, best 4/10   Location: bilateral back    Description: Aching and Tight  Aggravating Factors: Standing, Walking, Night Time and Lifting  Easing Factors: ice, rest and stretching    Patients goals: improve mobility, decrease pain     Medical History:   Past Medical History:   Diagnosis Date    Hypertension        Surgical History:   Lorenzo Cross  has a past surgical history that includes Hand surgery; THIGH BIOSPY; Leg Surgery; and Colonoscopy (N/A, 9/9/2020).    Medications:   Lorenzo has a current medication list which includes the following prescription(s): apixaban, aspirin, cyclobenzaprine, fluticasone propionate, methocarbamol, phentermine, and tadalafil.    Allergies:   Review of patient's allergies indicates:   Allergen Reactions    Latex, natural rubber Rash     With prolonged exposure, he will develop rash - raised, red, itchy    Lidocaine Rash      OBJECTIVE     Observation/Posture: rounded shoulders with slight thoracic kyphosis. Very pleasant WM.      Lumbar Range of Motion:     Degrees Pain   Flexion 50% limited    + (tightness and mild discomfort)        Extension WNL    + (pain at end range)        Left Side Bending Mid thigh +         Right Side Bending Distal lateral thigh -         Left rotation    WNL -         Right Rotation    WNL -               Lower Extremity Strength  Right LE   Left LE     Knee extension: 5/5 Knee extension: 5/5   Knee flexion: 5/5 Knee flexion: 5/5    Hip flexion: 4+/5 Hip flexion: 4+/5   Hip extension:  4/5 Hip extension: 4/5   Hip abduction: 4+/5 Hip abduction: 4+/5   Hip adduction: 4+/5 Hip adduction 4+/5   Ankle dorsiflexion: 5/5 Ankle dorsiflexion: 5/5   Ankle plantarflexion: 5/5 Ankle plantarflexion: 5/5         Special Tests:  -Repeated Flexion: + (felt good, improved symptoms)  -Repeated Ext: -      Neuro Dynamic Testing:               Sciatic nerve:                                       SLR:    R = -                                      L = -     Joint Mobility: decreased into extension with springing technique, PSIS was in alignment upon examination.     Palpation: Mild TTP along the PSIS and the lumbar paraspinals B     Sensation: intact B to light touch     Flexibility:               Hamstring Test: R = 60 degrees ; L = 60 degrees    Limitation/Restriction for FOTO Lumbar Spine Survey    Therapist reviewed FOTO scores for Lorenzo Cross on 6/30/2022.   FOTO documents entered into [employer] - see Media section.    Limitation Score: 40%     TREATMENT     Total Treatment time (time-based codes) separate from Evaluation: 10 minutes      Anthony received the treatments listed below:      Therapeutic exercises to develop strength, endurance, ROM, flexibility, posture and core stabilization for 10 minutes including:    Long Sitting Hamstring Stretch 3x10s B  Seated Lumbar Flexion x5 reps  Seated Figure 4 Stretch 3x10s B  Wall Slides x10 reps Bilateral Upper Extremities  Open Books with LE drop off x5 reps B  Supine Pelvic Tilts x10 reps (3 sec holds)    Possible for Next Session: Dry Needling, Motor Control    PATIENT EDUCATION AND HOME EXERCISES     Education provided:   - Home Exercise Program Administration and Review  - Post Exercise Soreness  - Maintaining a pain free range of motion with all activities  - Anatomy/Physiology of the Lumbar Spine and the surrounding musculature    Written Home Exercises Provided: yes. Exercises were reviewed and Anthony was able  to demonstrate them prior to the end of the session.  Anthony demonstrated good  understanding of the education provided. See EMR under Patient Instructions for exercises provided during therapy sessions.    ASSESSMENT     Lorenzo is a 57 y.o. male referred to outpatient Physical Therapy with a medical diagnosis of back spasm. Patient presents with decreased lumbar mobility, limited tolerance to end range extension, poor postural awareness, decreased LE strength, and decreased endurance/tolerance to activity. Patient responded fairly well to lumbar flexion based activities as well as thoracic extension, and core based exercises. Dry needling may prove beneficial in order to address poor tissue extensibility and pain. Treatment will also focus on improving flexibility and hip, glute, core strength. Patient will benefit from skilled outpatient Physical Therapy to address the deficits stated above and in the chart below, provide patient /family education, and to maximize patientt's level of independence.      Patient prognosis is Good.     Plan of care discussed with patient: Yes  Patient's spiritual, cultural and educational needs considered and patient is agreeable to the plan of care and goals as stated below:     Anticipated Barriers for therapy: work schedule    Medical Necessity is demonstrated by the following  History  Co-morbidities and personal factors that may impact the plan of care Co-morbidities:   HTN    Personal Factors:   no deficits     low   Examination  Body Structures and Functions, activity limitations and participation restrictions that may impact the plan of care Body Regions:   back  lower extremities    Body Systems:    gross symmetry  ROM  strength  gross coordinated movement    Participation Restrictions:   Difficulty bending over and lifting  Difficulty standing for long periods   Difficulty walking long distances    Activity limitations:   Learning and applying knowledge  no  deficits    General Tasks and Commands  no deficits    Communication  no deficits    Mobility  lifting and carrying objects  walking    Self care  no deficits    Domestic Life  shopping  cooking  doing house work (cleaning house, washing dishes, laundry)  assisting others    Interactions/Relationships  family relationships    Life Areas  employment    Community and Social Life  community life  recreation and leisure         moderate   Clinical Presentation stable and uncomplicated low   Decision Making/ Complexity Score: low     Goals:  Short Term Goals: 4 weeks   - Patient will demonstrate improved hip abduction strength by at least 1/2 grade via MMT for increased stability and support with functional activities.  - Patient will demonstrate improved lumbar range of motion into side bending to the distal thigh for improved independence with ADL's.  - Patient will be able to lift at least 10# from floor to waist with proper squat mechanics and minimal to no exacerbation of symptoms for increased ability to perform restaurant related duties.  - Patient will be able to stand with proper posture for at least 1 hour with minimal to no increase in symptoms/pain for increased ability to tolerate household and community based tasks.    Long Term Goals: 8 weeks   - Patient will demonstrate improved hip extension strength by at least 1/2 grade via MMT for increased stability and support with functional activities.  - Patient will demonstrate improved lumbar range of motion into extension to the distal thigh for improved independence with ADL's.  - Patient will be able to stand with proper posture for at least 2 hours with minimal to no increase in symptoms/pain for increased ability to tolerate household and community based tasks.  - Patient will be able to lift at least 20# from floor to waist with proper squat mechanics and minimal to no exacerbation of symptoms for increased ability to perform restaurant related duties.  -  Patient will demonstrate independence with Home Exercise Program in order to continue to make improvements outside the clinical setting.    PLAN   Plan of care Certification: 6/30/2022 to 8/30/22.    Outpatient Physical Therapy 2 times weekly for 8 weeks to include the following interventions: Aquatic Therapy, Cervical/Lumbar Traction, Electrical Stimulation IFC/TENS/PREMOD, Gait Training, Manual Therapy, Moist Heat/ Ice, Neuromuscular Re-ed, Patient Education, Self Care, Therapeutic Activities, Therapeutic Exercise, Ultrasound and Dry Needling (by a certified therapist).     This patient CAN be treated by a PTA.    Possible for Next Session: Dry Needling, Motor Control    Valarie Restrepo, ALLYSON, DPT, Cert. DN      I CERTIFY THE NEED FOR THESE SERVICES FURNISHED UNDER THIS PLAN OF TREATMENT AND WHILE UNDER MY CARE   Physician's comments:     Physician's Signature: ___________________________________________________

## 2022-07-05 NOTE — PROGRESS NOTES
[facility]   Physical Therapy Treatment Note     Name: Lroenzo Cross  Clinic Number: [id number]    Therapy Diagnosis:   Encounter Diagnoses   Name Primary?    Decreased strength of lower extremity Yes    Decreased ROM of lumbar spine     Decreased functional activity tolerance      Physician: Clifford Sotelo MD    Visit Date: 7/6/2022    Physician: Clifford Sotelo MD     Physician Orders: PT Eval and Treat  Medical Diagnosis from Referral: M62.830 (ICD-10-CM) - Back spasm  Evaluation Date: 6/30/2022  Authorization Period Expiration: 12/31/22  Plan of Care Expiration: 8/30/22  Progress Note Due: 7/30/22  Visit # / Visits authorized: 1 / 20 (1 / 1 Eval)  FOTO: 1 / 3 (6/30/22 - IE)     Precautions: Essential HTN     PTA Visit #: 0 / 5     Time In: 2:15 PM  Time Out: 3:00 PM  Total Billable Time: 45 minutes    SUBJECTIVE     Pt reports: that things are feeling a little better but he did wake up hurting more this morning. Continues to stretch and use the heat/tens machine which does help. He took a muscle relaxer earlier today and is not having any pain right now. Willing to try the dry needling today.    He was compliant with home exercise program.  Response to previous treatment: too soon to tell  Functional change: no functional change at this time.    Pain: 0/10  Location: bilateral back      OBJECTIVE     Objective Measures updated at progress report unless specified.     Treatment     Anthony received the treatments listed below:      Therapeutic exercises to develop strength, endurance, ROM, flexibility, posture and core stabilization for 30 minutes including:     Recumbent Bike x5 min (Seat: 9 ; Level: 3)  Gastroc Stretch x2 min (10s holds) (Slant Board Level 3)  Soleus Stretch x1 min B (10s holds)  Seated Stability Ball Roll Outs x2 min  Seated Lumbar Flexion x10 reps   Supine Pelvic Tilts 2x10 reps (3 sec holds)  Straight Leg Raise x10 reps B + TA Activation  Side-Lying Hip  Abduction 2x10 reps B + TA Activation         Possible for Next Session: Dry Needling, Motor Control      Pt received Manual Therapy techniques in the form of Dry Needling for x15 min. This was applied to the Lumbar Paraspinals B. Dry needling was performed to decrease inflammation, increase circulation, decrease pain and restore homeostasis.      50 mm needles with 30 mm gauge were used for all insertion points. Patient prone lying with bolster under LE's and pillow under hips.    Electrical Stimulation was applied this date while needles were in situ x8 minutes. Intensity increased to patient tolerance. No adverse reactions noted.    Patient gave Written and Verbal consent to undergo dry needling. Written consent can be found in the media section in pts chart. All needles were removed and changes in signs and symptoms were assessed. No adverse reactions noted at the conclusion of the treatment.     Patient received a hot pack for 5 minutes to the lumbar spine post treatment session to assist with pain relief and improved tissue extensibility. Patient prone lying with pillow under hips and bolster under the LE's.    Patient Education and Home Exercises     Home Exercises Provided and Patient Education Provided     Education provided:   - Home Exercise Program Review  - DN Consent Form  - Post Exercise Soreness - especially after dry needling  - Maintaining a pain free range of motion with all activities  - Anatomy/Physiology of the Lumbar Spine and the surrounding musculature    Written Home Exercises Provided: Patient instructed to cont prior HEP. Exercises were reviewed and Anthony was able to demonstrate them prior to the end of the session.  Anthony demonstrated good  understanding of the education provided. See EMR under Patient Instructions for exercises provided during therapy sessions    ASSESSMENT     Patient had intermittent discomfort in the lower back throughout the treatment session, especially with more  rotational movements. Emphasis was placed on core activation as well as hip and glute strengthening. Dry needling was introduced this date in order to improve the tissue extensibility of the lumbar paraspinals and help with pain relief. Skin was cleaned pre and post needle insertion removal - no adverse reactions observed. Will continue to progress patient next session as able.    Anthony Is progressing well towards his goals.   Pt prognosis is Good.     Pt will continue to benefit from skilled outpatient physical therapy to address the deficits listed in the problem list box on initial evaluation, provide pt/family education and to maximize pt's level of independence in the home and community environment.     Pt's spiritual, cultural and educational needs considered and pt agreeable to plan of care and goals.     Anticipated barriers to physical therapy: work schedule    Goals:   Short Term Goals: 4 weeks   - Patient will demonstrate improved hip abduction strength by at least 1/2 grade via MMT for increased stability and support with functional activities. (progressing, not met)  - Patient will demonstrate improved lumbar range of motion into side bending to the distal thigh for improved independence with ADL's. 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 (progressing, not met)      Long Term Goals: 8 weeks   - Patient will demonstrate improved hip extension strength by at least 1/2 grade via MMT for increased stability and support with functional activities. (progressing, not met)  - Patient will demonstrate improved lumbar range of  motion into extension to the distal thigh for improved independence with ADL's. (progressing, not met)  - Patient will be able to stand with proper posture for at least 2 hours with minimal to no increase in symptoms/pain for increased ability to tolerate household and community based tasks. 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 (progressing, not met)    PLAN     Continue with established POC for improved functional mobility and return to PLOF.    Possible for Next Session: Dry Needling, Motor Control    Valarie Restrepo, PT, DPT, Cert. DN

## 2022-07-06 ENCOUNTER — CLINICAL SUPPORT (OUTPATIENT)
Dept: REHABILITATION | Facility: HOSPITAL | Age: 57
End: 2022-07-06
Payer: COMMERCIAL

## 2022-07-06 DIAGNOSIS — M53.86 DECREASED ROM OF LUMBAR SPINE: ICD-10-CM

## 2022-07-06 DIAGNOSIS — R68.89 DECREASED FUNCTIONAL ACTIVITY TOLERANCE: ICD-10-CM

## 2022-07-06 DIAGNOSIS — R29.898 DECREASED STRENGTH OF LOWER EXTREMITY: Primary | ICD-10-CM

## 2022-07-06 PROCEDURE — 97110 THERAPEUTIC EXERCISES: CPT | Mod: PN

## 2022-07-06 PROCEDURE — 97140 MANUAL THERAPY 1/> REGIONS: CPT | Mod: PN

## 2022-07-11 NOTE — PROGRESS NOTES
[facility]   Physical Therapy Treatment Note     Name: Lorenzo Cross  Clinic Number: [id number]    Therapy Diagnosis:   Encounter Diagnoses   Name Primary?    Decreased strength of lower extremity Yes    Decreased ROM of lumbar spine     Decreased functional activity tolerance      Physician: Clifford Sotelo MD    Visit Date: 7/13/2022    Physician: Clifford Sotelo MD     Physician Orders: PT Eval and Treat  Medical Diagnosis from Referral: M62.830 (ICD-10-CM) - Back spasm  Evaluation Date: 6/30/2022  Authorization Period Expiration: 12/31/22  Plan of Care Expiration: 8/30/22  Progress Note Due: 7/30/22  Visit # / Visits authorized: 2 / 20 (1 / 1 Eval)  FOTO: 1 / 3 (6/30/22 - IE)     Precautions: Essential HTN     PTA Visit #: 0 / 5     Time In: 3:00 PM  Time Out: 3:45 PM  Total Billable Time: 45 minutes    SUBJECTIVE     Pt reports: that he had a couple days of relief after last session. The pain has not been super bad, mainly hit or miss - all depends on how much he does during the day. Has a little tightness this afternoon but nothing too bad.    He was compliant with home exercise program.  Response to previous treatment: no adverse reactions   Functional change: decreased pain/symptoms    Pain: 0/10  Location: bilateral back      OBJECTIVE     Objective Measures updated at progress report unless specified.     Treatment     Anthony received the treatments listed below:      Therapeutic exercises to develop strength, endurance, ROM, flexibility, posture and core stabilization for 30 minutes including:     Recumbent Bike x5 min (Seat: 9 ; Level: 3)  Gastroc Stretch x2 min (10s holds) (Slant Board Level 3)  Soleus Stretch x1 min B (10s holds) (slant board level 3)  Seated Stability Ball Roll Outs x2 min (flexion only)  Seated Lumbar Flexion x10 reps   Supine Pelvic Tilts 2x10 reps (3 sec holds) + LE Marching with Green Loop around knees  Straight Leg Raise 2x10 reps B + TA  Activation  Side-Lying Hip Abduction 2x10 reps B + TA Activation         Possible for Next Session: Dry Needling, Motor Control, shuttle press      Pt received Manual Therapy techniques in the form of Dry Needling for x15 min. This was applied to the Lumbar Paraspinals B. Dry needling was performed to decrease inflammation, increase circulation, decrease pain and restore homeostasis.      50 mm needles with 30 mm gauge were used for all insertion points. Patient prone lying with bolster under LE's and pillow under hips.    Electrical Stimulation was applied this date while needles were in situ x8 minutes. Intensity increased to patient tolerance. No adverse reactions noted.    Patient gave Verbal consent to undergo dry needling. Written consent can be found in the media section in pts chart. All needles were removed and changes in signs and symptoms were assessed. No adverse reactions noted at the conclusion of the treatment.     Patient received a hot pack for 5 minutes to the lumbar spine post treatment session to assist with pain relief and improved tissue extensibility. Patient prone lying with pillow under hips and bolster under the LE's.    Patient Education and Home Exercises     Home Exercises Provided and Patient Education Provided     Education provided:   - Home Exercise Program Review  - Post Exercise Soreness - especially after dry needling  - Maintaining a pain free range of motion with all activities  - Anatomy/Physiology of the Lumbar Spine and the surrounding musculature    Written Home Exercises Provided: Patient instructed to cont prior HEP. Exercises were reviewed and Anthony was able to demonstrate them prior to the end of the session.  Anthony demonstrated good  understanding of the education provided. See EMR under Patient Instructions for exercises provided during therapy sessions    ASSESSMENT     Focus placed on proper TA activation with all activities. No exacerbation of symptoms reported.  Verbal cues for prevention of posterior rotation of hips during side-lying hip abduction exercise - improved with increased time/reps. Progressed pelvic tilts to include a LE March for increased core engagement and functional movement while protecting the lumbar spine. Dry needling was performed again this date with specific focus on improving tissue extensibility. Skin was cleaned pre and post needle insertion/removal with no adverse reactions observed. Will continue to progress as able next session. Plan to continue incorporating higher level motor control activities and LE strengthening combined with dry needling.    Anthony Is progressing well towards his goals.   Pt prognosis is Good.     Pt will continue to benefit from skilled outpatient physical therapy to address the deficits listed in the problem list box on initial evaluation, provide pt/family education and to maximize pt's level of independence in the home and community environment.     Pt's spiritual, cultural and educational needs considered and pt agreeable to plan of care and goals.     Anticipated barriers to physical therapy: work schedule    Goals:   Short Term Goals: 4 weeks   - Patient will demonstrate improved hip abduction strength by at least 1/2 grade via MMT for increased stability and support with functional activities. (progressing, not met)  - Patient will demonstrate improved lumbar range of motion into side bending to the distal thigh for improved independence with ADL's. 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 (progressing, not met)      Long Term  Goals: 8 weeks   - Patient will demonstrate improved hip extension strength by at least 1/2 grade via MMT for increased stability and support with functional activities. (progressing, not met)  - Patient will demonstrate improved lumbar range of motion into extension to the distal thigh for improved independence with ADL's. (progressing, not met)  - Patient will be able to stand with proper posture for at least 2 hours with minimal to no increase in symptoms/pain for increased ability to tolerate household and community based tasks. 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 (progressing, not met)    PLAN     Continue with established POC for improved functional mobility and return to PLOF.    Possible for Next Session: Dry Needling, Motor Control, shuttle press    Valarie Restrepo, PT, DPT, Cert. DN

## 2022-07-13 ENCOUNTER — CLINICAL SUPPORT (OUTPATIENT)
Dept: REHABILITATION | Facility: HOSPITAL | Age: 57
End: 2022-07-13
Payer: COMMERCIAL

## 2022-07-13 DIAGNOSIS — R29.898 DECREASED STRENGTH OF LOWER EXTREMITY: Primary | ICD-10-CM

## 2022-07-13 DIAGNOSIS — M53.86 DECREASED ROM OF LUMBAR SPINE: ICD-10-CM

## 2022-07-13 DIAGNOSIS — R68.89 DECREASED FUNCTIONAL ACTIVITY TOLERANCE: ICD-10-CM

## 2022-07-13 PROCEDURE — 97110 THERAPEUTIC EXERCISES: CPT | Mod: PN

## 2022-07-13 PROCEDURE — 97140 MANUAL THERAPY 1/> REGIONS: CPT | Mod: PN

## 2022-07-20 ENCOUNTER — CLINICAL SUPPORT (OUTPATIENT)
Dept: REHABILITATION | Facility: HOSPITAL | Age: 57
End: 2022-07-20
Payer: COMMERCIAL

## 2022-07-20 DIAGNOSIS — R68.89 DECREASED FUNCTIONAL ACTIVITY TOLERANCE: ICD-10-CM

## 2022-07-20 DIAGNOSIS — M53.86 DECREASED ROM OF LUMBAR SPINE: ICD-10-CM

## 2022-07-20 DIAGNOSIS — R29.898 DECREASED STRENGTH OF LOWER EXTREMITY: Primary | ICD-10-CM

## 2022-07-20 PROCEDURE — 97140 MANUAL THERAPY 1/> REGIONS: CPT | Mod: PN

## 2022-07-20 PROCEDURE — 97110 THERAPEUTIC EXERCISES: CPT | Mod: PN

## 2022-07-20 NOTE — PROGRESS NOTES
[facility]   Physical Therapy Treatment Note     Name: Lorenzo Cross  Clinic Number: [id number]    Therapy Diagnosis:   Encounter Diagnoses   Name Primary?    Decreased strength of lower extremity Yes    Decreased ROM of lumbar spine     Decreased functional activity tolerance      Physician: Clifford Sotelo MD    Visit Date: 7/20/2022    Physician: Clifford Sotelo MD     Physician Orders: PT Eval and Treat  Medical Diagnosis from Referral: M62.830 (ICD-10-CM) - Back spasm  Evaluation Date: 6/30/2022  Authorization Period Expiration: 12/31/22  Plan of Care Expiration: 8/30/22  Progress Note Due: 7/30/22  Visit # / Visits authorized: 3 / 20 (1 / 1 Eval)  FOTO: 1 / 3 (6/30/22 - IE)     Precautions: Essential HTN     PTA Visit #: 0 / 5     Time In: 3:00 PM  Time Out: 3:50 PM  Total Billable Time: 45 minutes    SUBJECTIVE     Pt reports: he lifted heavy boxes yesterday evening and has some increased soreness today.    He was compliant with home exercise program.  Response to previous treatment: no adverse reactions   Functional change: decreased pain/symptoms    Pain: 5/10  Location: bilateral back      OBJECTIVE     Objective Measures updated at progress report unless specified.     Treatment     Anthony received the treatments listed below:      Therapeutic exercises to develop strength, endurance, ROM, flexibility, posture and core stabilization for 30 minutes including:     Recumbent Bike x 5 min (Seat: 9 ; Level: 3)  Gastroc Stretch x2 min (10s holds) (Slant Board Level 3)  Soleus Stretch x1 min B (10s holds) (slant board level 3)  Seated Stability Ball Roll Outs x2 min (flexion only)  Seated Lumbar Flexion x10 reps   Supine Pelvic Tilts 2x10 reps (3 sec holds) + LE Marching with Green Loop around knees  Straight Leg Raise 2x10 reps B + TA Activation  Side-Lying Hip Abduction 2x10 reps B + TA Activation         Possible for Next Session: Dry Needling, Motor Control, shuttle  press      Pt received Manual Therapy techniques in the form of Dry Needling for x15 min. This was applied to the Lumbar Paraspinals B. Dry needling was performed to decrease inflammation, increase circulation, decrease pain and restore homeostasis.      50 mm needles with 30 mm gauge were used for all insertion points. Patient prone lying with bolster under LE's and pillow under hips.    Electrical Stimulation was applied this date while needles were in situ x8 minutes. Intensity increased to patient tolerance. No adverse reactions noted.    Patient gave Verbal consent to undergo dry needling. Written consent can be found in the media section in pts chart. All needles were removed and changes in signs and symptoms were assessed. No adverse reactions noted at the conclusion of the treatment.     Patient received a hot pack for 5 minutes to the lumbar spine post treatment session to assist with pain relief and improved tissue extensibility. Patient prone lying with pillow under hips and bolster under the LE's.    Patient Education and Home Exercises     Home Exercises Provided and Patient Education Provided     Education provided:   - Home Exercise Program Review  - Post Exercise Soreness - especially after dry needling  - Maintaining a pain free range of motion with all activities  - Anatomy/Physiology of the Lumbar Spine and the surrounding musculature    Written Home Exercises Provided: Patient instructed to cont prior HEP. Exercises were reviewed and Anthony was able to demonstrate them prior to the end of the session.  Anthony demonstrated good  understanding of the education provided. See EMR under Patient Instructions for exercises provided during therapy sessions    ASSESSMENT     Anthony continued to require frequent verbal cues to maintain core activation during exercises which prevented further progression of exercises on this date.  He continues to have tension in bilateral lumbar paraspinal musculature and  returned to therapy with increased pain due to lifting heavy boxes yesterday.  Patient will continue to focus on activating core and progress exercises as tolerated to improve tolerance to lifting activities.  Patient has appropriate response to dry needling with no adverse reactions noted.    Anthony Is progressing well towards his goals.   Pt prognosis is Good.     Pt will continue to benefit from skilled outpatient physical therapy to address the deficits listed in the problem list box on initial evaluation, provide pt/family education and to maximize pt's level of independence in the home and community environment.     Pt's spiritual, cultural and educational needs considered and pt agreeable to plan of care and goals.     Anticipated barriers to physical therapy: work schedule    Goals:   Short Term Goals: 4 weeks   - Patient will demonstrate improved hip abduction strength by at least 1/2 grade via MMT for increased stability and support with functional activities. (progressing, not met)  - Patient will demonstrate improved lumbar range of motion into side bending to the distal thigh for improved independence with ADL's. 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 (progressing, not met)      Long Term Goals: 8 weeks   - Patient will demonstrate improved hip extension strength by at least 1/2 grade via MMT for increased stability and support with functional activities. (progressing, not met)  - Patient will demonstrate improved lumbar range of motion into extension to the distal thigh for improved independence with ADL's.  (progressing, not met)  - Patient will be able to stand with proper posture for at least 2 hours with minimal to no increase in symptoms/pain for increased ability to tolerate household and community based tasks. 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 (progressing, not met)    PLAN     Continue with established POC for improved functional mobility and return to PLOF.    Possible for Next Session: Dry Needling, Motor Control, shuttle press    Jhonathan Govea, PT, DPT, Cert. DN

## 2022-07-27 ENCOUNTER — CLINICAL SUPPORT (OUTPATIENT)
Dept: REHABILITATION | Facility: HOSPITAL | Age: 57
End: 2022-07-27
Payer: COMMERCIAL

## 2022-07-27 DIAGNOSIS — R68.89 DECREASED FUNCTIONAL ACTIVITY TOLERANCE: ICD-10-CM

## 2022-07-27 DIAGNOSIS — R29.898 DECREASED STRENGTH OF LOWER EXTREMITY: Primary | ICD-10-CM

## 2022-07-27 DIAGNOSIS — M53.86 DECREASED ROM OF LUMBAR SPINE: ICD-10-CM

## 2022-07-27 PROCEDURE — 97110 THERAPEUTIC EXERCISES: CPT | Mod: PN

## 2022-07-27 PROCEDURE — 97140 MANUAL THERAPY 1/> REGIONS: CPT | Mod: PN

## 2022-07-27 NOTE — PROGRESS NOTES
[facility]   Physical Therapy Treatment Note     Name: Lorenzo Cross  Clinic Number: [id number]    Therapy Diagnosis:   Encounter Diagnoses   Name Primary?    Decreased strength of lower extremity Yes    Decreased ROM of lumbar spine     Decreased functional activity tolerance      Physician: Clifford Sotelo MD    Visit Date: 7/27/2022    Physician: Clifford Sotelo MD     Physician Orders: PT Eval and Treat  Medical Diagnosis from Referral: M62.830 (ICD-10-CM) - Back spasm  Evaluation Date: 6/30/2022  Authorization Period Expiration: 12/31/22  Plan of Care Expiration: 8/30/22  Progress Note Due: 7/30/22  Visit # / Visits authorized: 4 / 20 (1 / 1 Eval)  FOTO: 1 / 3 (6/30/22 - IE)     Precautions: Essential HTN     PTA Visit #: 0 / 5     Time In: 3:00 PM  Time Out: 3:58 PM  Total Billable Time: 53 minutes    SUBJECTIVE     Pt reports: he has been feeling better overall. He does continued to experience increased pain during prolonged standing at work.    He was compliant with home exercise program.  Response to previous treatment: no adverse reactions   Functional change: decreased pain/symptoms    Pain: 1/10  Location: bilateral back      OBJECTIVE     Objective Measures updated at progress report unless specified.     Treatment     Anthony received the treatments listed below:      Therapeutic exercises to develop strength, endurance, ROM, flexibility, posture and core stabilization for 38 minutes including:     Recumbent Bike x 5 min (Seat: 9 ; Level: 3)  Seated Stability Ball Roll Outs x2 min (flexion only)  Seated Lumbar Flexion x10 reps   Supine Pelvic Tilts 2x10 reps (3 sec holds) + LE Marching with Green Loop around knees  Dead bug 1 x 15  Bridging 2 x 10  Bird dog 1 x 10  Straight Leg Raise 2x10 reps B + TA Activation  Side-Lying Hip Abduction 2x10 reps B + TA Activation  Pallof press 1 x 10, BTB  Wall squats 2 x 10             Possible for Next Session: Dry Needling, Motor  Control, shuttle press      Pt received Manual Therapy techniques in the form of Dry Needling for x15 min. This was applied to the Lumbar Paraspinals B. Dry needling was performed to decrease inflammation, increase circulation, decrease pain and restore homeostasis.      50 mm needles with 30 mm gauge were used for all insertion points. Patient prone lying with bolster under LE's and pillow under hips.    Electrical Stimulation was applied this date while needles were in situ x8 minutes. Intensity increased to patient tolerance. No adverse reactions noted.    Patient gave Verbal consent to undergo dry needling. Written consent can be found in the media section in pts chart. All needles were removed and changes in signs and symptoms were assessed. No adverse reactions noted at the conclusion of the treatment.     Patient received a hot pack for 5 minutes to the lumbar spine post treatment session to assist with pain relief and improved tissue extensibility. Patient prone lying with pillow under hips and bolster under the LE's.    Patient Education and Home Exercises     Home Exercises Provided and Patient Education Provided     Education provided:   - Home Exercise Program Review  - Post Exercise Soreness - especially after dry needling  - Maintaining a pain free range of motion with all activities  - Anatomy/Physiology of the Lumbar Spine and the surrounding musculature    Written Home Exercises Provided: Patient instructed to cont prior HEP. Exercises were reviewed and Anthony was able to demonstrate them prior to the end of the session.  Anthony demonstrated good  understanding of the education provided. See EMR under Patient Instructions for exercises provided during therapy sessions    ASSESSMENT     Anthony is reporting overall decrease in low back pain since beginning therapy.  He is progressed with core stabilization exercises with addition of bridging, dead bug, and bird dog exercises and had no increased symptoms  following today's progression.  He will continue with progression to improve strength and stability during normal work activities at [employer].  Patient has appropriate response to dry needling with no adverse reactions noted.    Anthony Is progressing well towards his goals.   Pt prognosis is Good.     Pt will continue to benefit from skilled outpatient physical therapy to address the deficits listed in the problem list box on initial evaluation, provide pt/family education and to maximize pt's level of independence in the home and community environment.     Pt's spiritual, cultural and educational needs considered and pt agreeable to plan of care and goals.     Anticipated barriers to physical therapy: work schedule    Goals:   Short Term Goals: 4 weeks   - Patient will demonstrate improved hip abduction strength by at least 1/2 grade via MMT for increased stability and support with functional activities. (progressing, not met)  - Patient will demonstrate improved lumbar range of motion into side bending to the distal thigh for improved independence with ADL's. 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 (progressing, not met)      Long Term Goals: 8 weeks   - Patient will demonstrate improved hip extension strength by at least 1/2 grade via MMT for increased stability and support with functional activities. (progressing, not met)  - Patient will demonstrate improved lumbar range of motion into extension to the distal thigh for improved independence with ADL's. (progressing, not met)  - Patient will be able to stand with proper posture  for at least 2 hours with minimal to no increase in symptoms/pain for increased ability to tolerate household and community based tasks. 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 (progressing, not met)    PLAN     Continue with established POC for improved functional mobility and return to PLOF.    Possible for Next Session: Dry Needling, Motor Control, shuttle press    Jhonathan Govea, PT, DPT, Cert. DN

## 2022-07-30 ENCOUNTER — TELEPHONE (OUTPATIENT)
Dept: FAMILY MEDICINE | Facility: CLINIC | Age: 57
End: 2022-07-30
Payer: COMMERCIAL

## 2022-07-30 NOTE — TELEPHONE ENCOUNTER
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    Would the patient rather a call back or a response via [facility]?     Best call back number: [phone] (mobile)    Additional Information:

## 2022-08-01 NOTE — PROGRESS NOTES
[facility]   Physical Therapy Treatment Note & Re-assessment    Name: Lorenzo Cross  Clinic Number: [id number]    Therapy Diagnosis:   Encounter Diagnoses   Name Primary?    Decreased strength of lower extremity Yes    Decreased ROM of lumbar spine     Decreased functional activity tolerance      Physician: Clifford Sotelo MD    Visit Date: 8/3/2022    Physician: Clifford Sotelo MD     Physician Orders: PT Eval and Treat  Medical Diagnosis from Referral: M62.830 (ICD-10-CM) - Back spasm  Evaluation Date: 6/30/2022  Authorization Period Expiration: 12/31/22  Plan of Care Expiration: 10/3/22 = NEW POC DATE  Progress Note Due: 9/3/22  Visit # / Visits authorized: 5 / 20 (1 / 1 Eval) (re-assessed on 8/3/22)  FOTO: 2 / 3 (6/30/22 - IE, 8/3/22)     Precautions: Essential HTN     PTA Visit #: 0 / 5     Time In: 3:10 PM (pt arrived late)  Time Out: 3:48 PM  Total Billable Time: 38 minutes    SUBJECTIVE     Pt reports: that his back is better since starting therapy but it is still not 100%. He had some front hip pain on Monday and ended up taking a muscle relaxer yesterday that did help. He has tried to stretch and loosen everything back up since Monday as well. He would like to continue with therapy at this point. Felt okay after the dry needling last session.    He was compliant with home exercise program.  Response to previous treatment: no adverse reactions   Functional change: decreased pain/symptoms    Pain: 3/10  Location: bilateral back      OBJECTIVE     Objective Measures updated at progress report unless specified.     Observation/Posture: rounded shoulders with slight thoracic kyphosis. Very pleasant WM.      Lumbar Range of Motion:     Degrees Pain   Flexion 25% limited    - (tightness and mild discomfort)         Extension WNL    + (pain at end range)         Left Side Bending Mid thigh +         Right Side Bending Distal lateral thigh +         Left rotation    WNL + end  range         Right Rotation    WNL -               Lower Extremity Strength  Right LE   Left LE     Knee extension: 5/5 Knee extension: 5/5   Knee flexion: 5/5 Knee flexion: 5/5   Hip flexion: 4+/5 Hip flexion: 4+/5   Hip extension:  4+/5 Hip extension: 4+/5   Hip abduction: 4+/5 Hip abduction: 4+/5   Hip adduction: 4+/5 Hip adduction 4+/5   Ankle dorsiflexion: 5/5 Ankle dorsiflexion: 5/5   Ankle plantarflexion: 5/5 Ankle plantarflexion: 5/5         Special Tests:  -Repeated Flexion: + (felt good, improved symptoms)  -Repeated Ext: -      Neuro Dynamic Testing:               Sciatic nerve:                                       SLR:    R = -                                      L = -      Joint Mobility: decreased into extension with springing technique, PSIS was in alignment upon examination.      Palpation: Mild TTP along the PSIS and the lumbar paraspinals B     Sensation: intact B to light touch     Flexibility:               Hamstring Test: R = 60 degrees ; L = 60 degrees     Limitation/Restriction for FOTO Lumbar Spine Survey     Therapist reviewed FOTO scores for Lorenzo Cross on 8/3/2022.   FOTO documents entered into [employer] - see Media section.     Limitation Score: 6%     Treatment     Anthony received the treatments listed below:      Therapeutic exercises to develop strength, endurance, ROM, flexibility, posture and core stabilization for 23 minutes including: (billing for re-assessment and therapeutic exercise)     Recumbent Bike x5 min (Seat: 9 ; Level: 3) - NP today due to time   Seated Stability Ball Roll Outs x2 min (flexion only)  Seated Lumbar Flexion x10 reps   Supine Pelvic Tilts 2x10 reps (3 sec holds) + LE Marching with [place] around knees -NP today due to time  Dead Bugs 2x10 reps  Bridging 2x10 reps  Bird Dog x10 reps  Straight Leg Raise 2x10 reps B + TA Activation -NP today due to time  Side-Lying Hip Abduction 2x10 reps B + TA Activation -NP today due to time  Pallof press x10, BTB -NP  today due to time  Wall squats 2x10 -NP today due to time             Possible for Next Session: Dry Needling, Motor Control, shuttle press      Pt received Manual Therapy techniques in the form of Dry Needling for x15 min. This was applied to the Lumbar Paraspinals B. Dry needling was performed to decrease inflammation, increase circulation, decrease pain and restore homeostasis.      50 mm needles with 30 mm gauge were used for all insertion points. Patient prone lying with bolster under LE's and pillow under hips.    Electrical Stimulation was applied this date while needles were in situ x8 minutes. Intensity increased to patient tolerance. No adverse reactions noted.    Patient gave Verbal consent to undergo dry needling. Written consent can be found in the media section in pts chart. All needles were removed and changes in signs and symptoms were assessed. No adverse reactions noted at the conclusion of the treatment.     Patient received a hot pack for 5 minutes to the lumbar spine post treatment session to assist with pain relief and improved tissue extensibility. Patient prone lying with pillow under hips and bolster under the LE's.    Patient Education and Home Exercises     Home Exercises Provided and Patient Education Provided     Education provided:   - Home Exercise Program Review  - Post Exercise Soreness - especially after dry needling  - Maintaining a pain free range of motion with all activities  - Anatomy/Physiology of the Lumbar Spine and the surrounding musculature    Written Home Exercises Provided: Patient instructed to cont prior HEP. Exercises were reviewed and Anthony was able to demonstrate them prior to the end of the session.  Anthony demonstrated good  understanding of the education provided. See EMR under Patient Instructions for exercises provided during therapy sessions    ASSESSMENT     Upon re-assessment, noted patient to demonstrate improved FOTO score as well as improved LE strength.  Lumbar flexion range of motion has improved; however, left side bending continues to be painful. Flexion based exercises continue to feel beneficial. Pain, decreased flexibility, decreased range of motion, and LE weakness are all deficits that remain. The patient has found benefit from therapy and will continue to benefit from skilled outpatient PT services in order to address the remaining deficits. Dry needling continues to be beneficial for the patient in terms of pain relief and improving tissue extensibility. No adverse reactions from dry needling today.    Anthony Is progressing well towards his goals.   Pt prognosis is Good.     Pt will continue to benefit from skilled outpatient physical therapy to address the deficits listed in the problem list box on initial evaluation, provide pt/family education and to maximize pt's level of independence in the home and community environment.     Pt's spiritual, cultural and educational needs considered and pt agreeable to plan of care and goals.     Anticipated barriers to physical therapy: work schedule    Goals:   Short Term Goals: 4 weeks   - Patient will demonstrate improved hip abduction strength by at least 1/2 grade via MMT for increased stability and support with functional activities. (progressing, not met)  - Patient will demonstrate improved lumbar range of motion into side bending to the distal thigh for improved independence with ADL's. 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 (progressing, not met)      Long Term Goals: 8 weeks   - Patient will demonstrate improved hip  extension strength by at least 1/2 grade via MMT for increased stability and support with functional activities. (MET: 8/3/22)  - Patient will demonstrate improved lumbar range of motion into extension to the distal thigh for improved independence with ADL's. (progressing, not met)  - Patient will be able to stand with proper posture for at least 2 hours with minimal to no increase in symptoms/pain for increased ability to tolerate household and community based tasks. 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 (MET: 8/3/22)    PLAN     Plan of Care Expiration: 10/3/22 = NEW POC DATE    Continue with established POC for improved functional mobility and return to PLOF.    Possible for Next Session: Dry Needling, Motor Control, shuttle press    Valarie Restrepo PT, DPT, Cert. DN

## 2022-08-02 ENCOUNTER — OFFICE VISIT (OUTPATIENT)
Dept: FAMILY MEDICINE | Facility: CLINIC | Age: 57
End: 2022-08-02
Payer: COMMERCIAL

## 2022-08-02 VITALS
WEIGHT: 240.75 LBS | BODY MASS INDEX: 33.58 KG/M2 | HEART RATE: 88 BPM | DIASTOLIC BLOOD PRESSURE: 80 MMHG | SYSTOLIC BLOOD PRESSURE: 122 MMHG | OXYGEN SATURATION: 98 %

## 2022-08-02 DIAGNOSIS — E66.9 OBESITY (BMI 30-39.9): ICD-10-CM

## 2022-08-02 DIAGNOSIS — G89.29 CHRONIC MIDLINE LOW BACK PAIN WITHOUT SCIATICA: ICD-10-CM

## 2022-08-02 DIAGNOSIS — I10 ESSENTIAL HYPERTENSION: Primary | ICD-10-CM

## 2022-08-02 DIAGNOSIS — M25.551 HIP PAIN, ACUTE, RIGHT: ICD-10-CM

## 2022-08-02 DIAGNOSIS — M54.50 CHRONIC MIDLINE LOW BACK PAIN WITHOUT SCIATICA: ICD-10-CM

## 2022-08-02 PROCEDURE — 1160F PR REVIEW ALL MEDS BY PRESCRIBER/CLIN PHARMACIST DOCUMENTED: ICD-10-PCS | Mod: CPTII,S$GLB,, | Performed by: INTERNAL MEDICINE

## 2022-08-02 PROCEDURE — 3074F SYST BP LT 130 MM HG: CPT | Mod: CPTII,S$GLB,, | Performed by: INTERNAL MEDICINE

## 2022-08-02 PROCEDURE — 3074F PR MOST RECENT SYSTOLIC BLOOD PRESSURE < 130 MM HG: ICD-10-PCS | Mod: CPTII,S$GLB,, | Performed by: INTERNAL MEDICINE

## 2022-08-02 PROCEDURE — 99215 OFFICE O/P EST HI 40 MIN: CPT | Mod: S$GLB,,, | Performed by: INTERNAL MEDICINE

## 2022-08-02 PROCEDURE — 99999 PR PBB SHADOW E&M-EST. PATIENT-LVL III: ICD-10-PCS | Mod: PBBFAC,,, | Performed by: INTERNAL MEDICINE

## 2022-08-02 PROCEDURE — 3079F DIAST BP 80-89 MM HG: CPT | Mod: CPTII,S$GLB,, | Performed by: INTERNAL MEDICINE

## 2022-08-02 PROCEDURE — 1159F PR MEDICATION LIST DOCUMENTED IN MEDICAL RECORD: ICD-10-PCS | Mod: CPTII,S$GLB,, | Performed by: INTERNAL MEDICINE

## 2022-08-02 PROCEDURE — 99999 PR PBB SHADOW E&M-EST. PATIENT-LVL III: CPT | Mod: PBBFAC,,, | Performed by: INTERNAL MEDICINE

## 2022-08-02 PROCEDURE — 3008F PR BODY MASS INDEX (BMI) DOCUMENTED: ICD-10-PCS | Mod: CPTII,S$GLB,, | Performed by: INTERNAL MEDICINE

## 2022-08-02 PROCEDURE — 3079F PR MOST RECENT DIASTOLIC BLOOD PRESSURE 80-89 MM HG: ICD-10-PCS | Mod: CPTII,S$GLB,, | Performed by: INTERNAL MEDICINE

## 2022-08-02 PROCEDURE — 99215 PR OFFICE/OUTPT VISIT, EST, LEVL V, 40-54 MIN: ICD-10-PCS | Mod: S$GLB,,, | Performed by: INTERNAL MEDICINE

## 2022-08-02 PROCEDURE — 3008F BODY MASS INDEX DOCD: CPT | Mod: CPTII,S$GLB,, | Performed by: INTERNAL MEDICINE

## 2022-08-02 PROCEDURE — 1159F MED LIST DOCD IN RCRD: CPT | Mod: CPTII,S$GLB,, | Performed by: INTERNAL MEDICINE

## 2022-08-02 PROCEDURE — 1160F RVW MEDS BY RX/DR IN RCRD: CPT | Mod: CPTII,S$GLB,, | Performed by: INTERNAL MEDICINE

## 2022-08-02 RX ORDER — PHENTERMINE HYDROCHLORIDE 37.5 MG/1
37.5 TABLET ORAL
Qty: 30 TABLET | Refills: 0 | Status: SHIPPED | OUTPATIENT
Start: 2022-08-02 | End: 2022-09-01 | Stop reason: SDUPTHER

## 2022-08-02 NOTE — PROGRESS NOTES
Subjective:       Patient ID: Lorenzo Cross is a 57 y.o. male.    Chief Complaint: No chief complaint on file.      Saw Dr Acosta: CT calcium score 50.  Did not feel had hole in septum heart.  MRI not CVA.    Complains of right hip pain relieved with Robaxin    Low back pain improving with PT.     Obesity - lost 10 lb on Adipex.      Complains of intermittent low back spasm.  Leans forward at restaurant a lot.      DVT - on Eliquis for 3 of 6 months. Dr Acosta     HTN - controlled off medicine. His cuff runs sbp 10 higher.  He wants to check again.    PND - controlled with Flonase and OTC    LUIS - wants to lose weight      Just saw derm and had lesion frozen    Review of Systems   Constitutional: Negative for appetite change and fever.   HENT: Negative for nosebleeds and trouble swallowing.    Eyes: Negative for discharge and visual disturbance.   Respiratory: Negative for choking and shortness of breath.    Cardiovascular: Negative for chest pain and palpitations.   Gastrointestinal: Negative for abdominal pain, nausea and vomiting.   Musculoskeletal: Positive for arthralgias. Negative for joint swelling.   Skin: Negative for rash and wound.   Neurological: Negative for dizziness and syncope.   Psychiatric/Behavioral: Negative for confusion and dysphoric mood.       Objective:      Vitals:    08/02/22 1431   BP: 122/80   Pulse: 88     Physical Exam  Vitals reviewed.   Eyes:      Conjunctiva/sclera: Conjunctivae normal.   Cardiovascular:      Rate and Rhythm: Normal rate and regular rhythm.   Pulmonary:      Effort: Pulmonary effort is normal.      Breath sounds: Normal breath sounds.   Musculoskeletal:      Cervical back: Normal range of motion.      Comments: Normal ROM bilateral    Skin:     General: Skin is warm and dry.   Neurological:      Cranial Nerves: No cranial nerve deficit (grossly intact).   Psychiatric:      Comments: Alert and orientated           Assessment:       1. Essential hypertension     2. Hip pain, acute, right    3. Obesity (BMI 30-39.9)    4. Chronic midline low back pain without sciatica        Plan:       Essential hypertension    Hip pain, acute, right    Obesity (BMI 30-39.9)  -     phentermine (ADIPEX-P) 37.5 mg tablet; Take 1 tablet (37.5 mg total) by mouth before breakfast.  Dispense: 30 tablet; Refill: 0    Chronic midline low back pain without sciatica            Medication List with Changes/Refills   New Medications    PHENTERMINE (ADIPEX-P) 37.5 MG TABLET    Take 1 tablet (37.5 mg total) by mouth before breakfast.   Current Medications    APIXABAN (ELIQUIS) 5 MG TAB    Eliquis 5 mg tablet   TAKE 1 TABLET BY MOUTH TWICE DAILY    ASPIRIN 81 MG CHEW    Take 1 tablet by mouth once daily.    FLUTICASONE PROPIONATE (FLONASE) 50 MCG/ACTUATION NASAL SPRAY    2 sprays (100 mcg total) by Each Nostril route once daily.    TADALAFIL (CIALIS) 20 MG TAB    Take one tablet by mouth every 36 hours as needed.     2/3  Letter written for CDL license   Continue current management and monitor.    Counseled on regular exercise, maintenance of a healthy weight, balanced diet rich in fruits/vegetables and lean protein, and avoidance of unhealthy habits like smoking and excessive alcohol intake.   Also, counseled on importance of being compliant with medication, health appointments, diet and exercise.     Follow up in about 4 weeks (around 8/30/2022).     Total time spent on patient's needs today in preparing for the visit, the actual visit including counseling, managing the patient's needs and electronic record documentation was more than 40 minutes

## 2022-08-02 NOTE — LETTER
August 2, 2022    Lorenzo Cross  [address]  [facility] [address]             [facility] [address] [place] [address]  Phone: [phone]  Fax: [phone]   August 2, 2022     Patient: Lorenzo Cross   YOB: 1965   Date of Visit: 8/2/2022       To Whom it May Concern:    Lorenzo Cross is on a short course of Adipex to help with weight loss.  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    Also, Mr Cross has NOT had a stroke in the past.      If you have any questions or concerns, please don't hesitate to call.    Sincerely,         Clifford Sotelo MD

## 2022-08-03 ENCOUNTER — CLINICAL SUPPORT (OUTPATIENT)
Dept: REHABILITATION | Facility: HOSPITAL | Age: 57
End: 2022-08-03
Payer: COMMERCIAL

## 2022-08-03 DIAGNOSIS — M53.86 DECREASED ROM OF LUMBAR SPINE: ICD-10-CM

## 2022-08-03 DIAGNOSIS — R29.898 DECREASED STRENGTH OF LOWER EXTREMITY: Primary | ICD-10-CM

## 2022-08-03 DIAGNOSIS — R68.89 DECREASED FUNCTIONAL ACTIVITY TOLERANCE: ICD-10-CM

## 2022-08-03 PROCEDURE — 97140 MANUAL THERAPY 1/> REGIONS: CPT | Mod: PN

## 2022-08-03 PROCEDURE — 97110 THERAPEUTIC EXERCISES: CPT | Mod: PN

## 2022-08-03 NOTE — PLAN OF CARE
[facility]   Physical Therapy Treatment Note & Re-assessment    Name: Lorenzo Cross  Clinic Number: [id number]    Therapy Diagnosis:   Encounter Diagnoses   Name Primary?    Decreased strength of lower extremity Yes    Decreased ROM of lumbar spine     Decreased functional activity tolerance      Physician: Clifford Sotelo MD    Visit Date: 8/3/2022    Physician: Clifford Sotleo MD     Physician Orders: PT Eval and Treat  Medical Diagnosis from Referral: M62.830 (ICD-10-CM) - Back spasm  Evaluation Date: 6/30/2022  Authorization Period Expiration: 12/31/22  Plan of Care Expiration: 10/3/22 = NEW POC DATE  Progress Note Due: 9/3/22  Visit # / Visits authorized: 5 / 20 (1 / 1 Eval) (re-assessed on 8/3/22)  FOTO: 2 / 3 (6/30/22 - IE, 8/3/22)     Precautions: Essential HTN     PTA Visit #: 0 / 5     Time In: 3:10 PM (pt arrived late)  Time Out: 3:48 PM  Total Billable Time: 38 minutes    SUBJECTIVE     Pt reports: that his back is better since starting therapy but it is still not 100%. He had some front hip pain on Monday and ended up taking a muscle relaxer yesterday that did help. He has tried to stretch and loosen everything back up since Monday as well. He would like to continue with therapy at this point. Felt okay after the dry needling last session.    He was compliant with home exercise program.  Response to previous treatment: no adverse reactions   Functional change: decreased pain/symptoms    Pain: 3/10  Location: bilateral back      OBJECTIVE     Objective Measures updated at progress report unless specified.     Observation/Posture: rounded shoulders with slight thoracic kyphosis. Very pleasant WM.      Lumbar Range of Motion:     Degrees Pain   Flexion 25% limited    - (tightness and mild discomfort)         Extension WNL    + (pain at end range)         Left Side Bending Mid thigh +         Right Side Bending Distal lateral thigh +         Left rotation    WNL + end  range         Right Rotation    WNL -               Lower Extremity Strength  Right LE   Left LE     Knee extension: 5/5 Knee extension: 5/5   Knee flexion: 5/5 Knee flexion: 5/5   Hip flexion: 4+/5 Hip flexion: 4+/5   Hip extension:  4+/5 Hip extension: 4+/5   Hip abduction: 4+/5 Hip abduction: 4+/5   Hip adduction: 4+/5 Hip adduction 4+/5   Ankle dorsiflexion: 5/5 Ankle dorsiflexion: 5/5   Ankle plantarflexion: 5/5 Ankle plantarflexion: 5/5         Special Tests:  -Repeated Flexion: + (felt good, improved symptoms)  -Repeated Ext: -      Neuro Dynamic Testing:               Sciatic nerve:                                       SLR:    R = -                                      L = -      Joint Mobility: decreased into extension with springing technique, PSIS was in alignment upon examination.      Palpation: Mild TTP along the PSIS and the lumbar paraspinals B     Sensation: intact B to light touch     Flexibility:               Hamstring Test: R = 60 degrees ; L = 60 degrees     Limitation/Restriction for FOTO Lumbar Spine Survey     Therapist reviewed FOTO scores for Lorenzo Cross on 8/3/2022.   FOTO documents entered into [employer] - see Media section.     Limitation Score: 6%     Treatment     Anthony received the treatments listed below:      Therapeutic exercises to develop strength, endurance, ROM, flexibility, posture and core stabilization for 23 minutes including: (billing for re-assessment and therapeutic exercise)     Recumbent Bike x5 min (Seat: 9 ; Level: 3) - NP today due to time   Seated Stability Ball Roll Outs x2 min (flexion only)  Seated Lumbar Flexion x10 reps   Supine Pelvic Tilts 2x10 reps (3 sec holds) + LE Marching with [place] around knees -NP today due to time  Dead Bugs 2x10 reps  Bridging 2x10 reps  Bird Dog x10 reps  Straight Leg Raise 2x10 reps B + TA Activation -NP today due to time  Side-Lying Hip Abduction 2x10 reps B + TA Activation -NP today due to time  Pallof press x10, BTB -NP  today due to time  Wall squats 2x10 -NP today due to time             Possible for Next Session: Dry Needling, Motor Control, shuttle press      Pt received Manual Therapy techniques in the form of Dry Needling for x15 min. This was applied to the Lumbar Paraspinals B. Dry needling was performed to decrease inflammation, increase circulation, decrease pain and restore homeostasis.      50 mm needles with 30 mm gauge were used for all insertion points. Patient prone lying with bolster under LE's and pillow under hips.    Electrical Stimulation was applied this date while needles were in situ x8 minutes. Intensity increased to patient tolerance. No adverse reactions noted.    Patient gave Verbal consent to undergo dry needling. Written consent can be found in the media section in pts chart. All needles were removed and changes in signs and symptoms were assessed. No adverse reactions noted at the conclusion of the treatment.     Patient received a hot pack for 5 minutes to the lumbar spine post treatment session to assist with pain relief and improved tissue extensibility. Patient prone lying with pillow under hips and bolster under the LE's.    Patient Education and Home Exercises     Home Exercises Provided and Patient Education Provided     Education provided:   - Home Exercise Program Review  - Post Exercise Soreness - especially after dry needling  - Maintaining a pain free range of motion with all activities  - Anatomy/Physiology of the Lumbar Spine and the surrounding musculature    Written Home Exercises Provided: Patient instructed to cont prior HEP. Exercises were reviewed and Anthony was able to demonstrate them prior to the end of the session.  Anthony demonstrated good  understanding of the education provided. See EMR under Patient Instructions for exercises provided during therapy sessions    ASSESSMENT     Upon re-assessment, noted patient to demonstrate improved FOTO score as well as improved LE strength.  Lumbar flexion range of motion has improved; however, left side bending continues to be painful. Flexion based exercises continue to feel beneficial. Pain, decreased flexibility, decreased range of motion, and LE weakness are all deficits that remain. The patient has found benefit from therapy and will continue to benefit from skilled outpatient PT services in order to address the remaining deficits. Dry needling continues to be beneficial for the patient in terms of pain relief and improving tissue extensibility. No adverse reactions from dry needling today.    Anthony Is progressing well towards his goals.   Pt prognosis is Good.     Pt will continue to benefit from skilled outpatient physical therapy to address the deficits listed in the problem list box on initial evaluation, provide pt/family education and to maximize pt's level of independence in the home and community environment.     Pt's spiritual, cultural and educational needs considered and pt agreeable to plan of care and goals.     Anticipated barriers to physical therapy: work schedule    Goals:   Short Term Goals: 4 weeks   - Patient will demonstrate improved hip abduction strength by at least 1/2 grade via MMT for increased stability and support with functional activities. (progressing, not met)  - Patient will demonstrate improved lumbar range of motion into side bending to the distal thigh for improved independence with ADL's. 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 (progressing, not met)      Long Term Goals: 8 weeks   - Patient will demonstrate improved hip  extension strength by at least 1/2 grade via MMT for increased stability and support with functional activities. (MET: 8/3/22)  - Patient will demonstrate improved lumbar range of motion into extension to the distal thigh for improved independence with ADL's. (progressing, not met)  - Patient will be able to stand with proper posture for at least 2 hours with minimal to no increase in symptoms/pain for increased ability to tolerate household and community based tasks. 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 (MET: 8/3/22)    PLAN     Plan of Care Expiration: 10/3/22 = NEW POC DATE    Continue with established POC for improved functional mobility and return to PLOF.    Possible for Next Session: Dry Needling, Motor Control, shuttle press    Valarie Restrepo PT, DPT, Cert. DN

## 2022-08-08 NOTE — PROGRESS NOTES
[facility]   Physical Therapy Treatment Note    Name: Lorenzo Cross  Clinic Number: [id number]    Therapy Diagnosis:   Encounter Diagnoses   Name Primary?    Decreased strength of lower extremity Yes    Decreased ROM of lumbar spine     Decreased functional activity tolerance      Physician: Clifford Sotelo MD    Visit Date: 8/10/2022    Physician: Clifford Sotelo MD     Physician Orders: PT Eval and Treat  Medical Diagnosis from Referral: M62.830 (ICD-10-CM) - Back spasm  Evaluation Date: 6/30/2022  Authorization Period Expiration: 12/31/22  Plan of Care Expiration: 10/3/22  Progress Note Due: 9/3/22  Visit # / Visits authorized: 6 / 20 (1 / 1 Eval) (re-assessed on 8/3/22)  FOTO: 2 / 3 (6/30/22 - IE, 8/3/22)     Precautions: Essential HTN     PTA Visit #: 0 / 5     Time In: 3:11 PM (patient arrived late)   Time Out: 3:56 PM  Total Billable Time: 45 minutes    SUBJECTIVE     Pt reports: that his back is doing okay today. Still having more tightness some days in comparison to others but for the most part it is not as bad as it used to be in the beginning. Dry needling seems to be helping.    He was compliant with home exercise program.  Response to previous treatment: mild soreness that improved over time  Functional change: decreased pain/symptoms    Pain: 3/10  Location: bilateral back      OBJECTIVE     Objective Measures updated at progress report unless specified.     Treatment     Anthony received the treatments listed below:      Therapeutic exercises to develop strength, endurance, ROM, flexibility, posture and core stabilization for 30 minutes including:     Recumbent Bike x5 min (Seat: 9 ; Level: 4)  Gastroc Stretch x2 min (10s holds) (Slant Board Level 3)  Standing Shoulder Extensions 2x10 reps (CC Machine) (#20)    Standing Shoulder Rows 2x10 reps (CC Machine) (#27)  Standing Chops 2x10 reps (#10) B (single handle - one arm at a time)  Shuttle Hip Extensions 2x10 reps (1 black  cord)  Side Steps in crosswalk with RTB around ankles x1 lap  Pallof press 2x10 reps, BTB (single handle)  Seated Lumbar Flexion x10 reps                  Possible for Next Session: Dry Needling, Motor Control, shuttle press      Pt received Manual Therapy techniques in the form of Dry Needling for x15 min. This was applied to the Lumbar Paraspinals B and B superior glutes. Dry needling was performed to decrease inflammation, increase circulation, decrease pain and restore homeostasis.      50 mm needles with 30 mm gauge were used for all insertion points. Patient prone lying with bolster under LE's and pillow under hips.    Electrical Stimulation was applied this date while needles were in situ x8 minutes. Intensity increased to patient tolerance. No adverse reactions noted.    Patient gave Verbal consent to undergo dry needling. Written consent can be found in the media section in pts chart. All needles were removed and changes in signs and symptoms were assessed. No adverse reactions noted at the conclusion of the treatment.     Patient received a hot pack for 5 minutes to the lumbar spine post treatment session to assist with pain relief and improved tissue extensibility. Patient prone lying with pillow under hips and bolster under the LE's.    Patient Education and Home Exercises     Home Exercises Provided and Patient Education Provided     Education provided:   - Home Exercise Program Review  - Post Exercise Soreness - especially after dry needling  - Maintaining a pain free range of motion with all activities  - Anatomy/Physiology of the Lumbar Spine and the surrounding musculature    Written Home Exercises Provided: Patient instructed to cont prior HEP. Exercises were reviewed and Anthony was able to demonstrate them prior to the end of the session.  Anthony demonstrated good  understanding of the education provided. See EMR under Patient Instructions for exercises provided during therapy sessions    ASSESSMENT      Introduced more core and back specific strengthening activities today to help with stability - no adverse reactions observed. Emphasis placed on proper upright posture and proper TA activation with all activities. Overall, patient tolerated progression well - motor control and engagement of proper musculature without compensation continues to be challenging but in an appropriate way. Dry needling targeting the lumbar paraspinals as well as the superior glutes bilaterally. Noted trigger points just lateral the PSIS. Skin was cleaned pre and post needle insertion/removal with no adverse reactions observed. Will continue progression of activities as able.    Anthony Is progressing well towards his goals.   Pt prognosis is Good.     Pt will continue to benefit from skilled outpatient physical therapy to address the deficits listed in the problem list box on initial evaluation, provide pt/family education and to maximize pt's level of independence in the home and community environment.     Pt's spiritual, cultural and educational needs considered and pt agreeable to plan of care and goals.     Anticipated barriers to physical therapy: work schedule    Goals:   Short Term Goals: 4 weeks   - Patient will demonstrate improved hip abduction strength by at least 1/2 grade via MMT for increased stability and support with functional activities. (progressing, not met)  - Patient will demonstrate improved lumbar range of motion into side bending to the distal thigh for improved independence with ADL's. 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  (progressing, not met)      Long Term Goals: 8 weeks   - Patient will demonstrate improved hip extension strength by at least 1/2 grade via MMT for increased stability and support with functional activities. (MET: 8/3/22)  - Patient will demonstrate improved lumbar range of motion into extension to the distal thigh for improved independence with ADL's. (progressing, not met)  - Patient will be able to stand with proper posture for at least 2 hours with minimal to no increase in symptoms/pain for increased ability to tolerate household and community based tasks. 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 (MET: 8/3/22)    PLAN     Continue with established POC for improved functional mobility and return to PLOF.    Possible for Next Session: Dry Needling, Motor Control, shuttle press    Valarie Restrepo PT, DPT, Cert. DN

## 2022-08-10 ENCOUNTER — CLINICAL SUPPORT (OUTPATIENT)
Dept: REHABILITATION | Facility: HOSPITAL | Age: 57
End: 2022-08-10
Payer: COMMERCIAL

## 2022-08-10 DIAGNOSIS — M53.86 DECREASED ROM OF LUMBAR SPINE: ICD-10-CM

## 2022-08-10 DIAGNOSIS — R68.89 DECREASED FUNCTIONAL ACTIVITY TOLERANCE: ICD-10-CM

## 2022-08-10 DIAGNOSIS — R29.898 DECREASED STRENGTH OF LOWER EXTREMITY: Primary | ICD-10-CM

## 2022-08-10 PROCEDURE — 97140 MANUAL THERAPY 1/> REGIONS: CPT | Mod: PN

## 2022-08-10 PROCEDURE — 97110 THERAPEUTIC EXERCISES: CPT | Mod: PN

## 2022-08-15 NOTE — PROGRESS NOTES
[facility]   Physical Therapy Treatment Note    Name: Lorenzo Cross  Clinic Number: [id number]    Therapy Diagnosis:   Encounter Diagnoses   Name Primary?    Decreased strength of lower extremity Yes    Decreased ROM of lumbar spine     Decreased functional activity tolerance      Physician: Clifford Sotelo MD    Visit Date: 8/17/2022    Physician: Clifford Sotelo MD     Physician Orders: PT Eval and Treat  Medical Diagnosis from Referral: M62.830 (ICD-10-CM) - Back spasm  Evaluation Date: 6/30/2022  Authorization Period Expiration: 12/31/22  Plan of Care Expiration: 10/3/22  Progress Note Due: 9/3/22  Visit # / Visits authorized: 7 / 20 (1 / 1 Eval) (re-assessed on 8/3/22)  FOTO: 2 / 3 (6/30/22 - IE, 8/3/22)     Precautions: Essential HTN     PTA Visit #: 0 / 5     Time In: 3:53 PM (pt arrived late)   Time Out: 4:31 PM  Total Billable Time: 38 minutes    SUBJECTIVE     Pt reports: that he has been feeling pretty good overall this week. He felt good after the dry needling as well last week.    He was compliant with home exercise program.  Response to previous treatment: mild soreness that improved over time  Functional change: decreased pain/symptoms    Pain: 2/10  Location: bilateral back      OBJECTIVE     Objective Measures updated at progress report unless specified.     Treatment     Anthony received the treatments listed below:      Therapeutic exercises to develop strength, endurance, ROM, flexibility, posture and core stabilization for 23 minutes including:     (NP today - time)  Gastroc Stretch x2 min (10s holds) (Slant Board Level 3)  Standing Shoulder Extensions 2x10 reps (CC Machine) (#20)    Standing Shoulder Rows 2x10 reps (CC Machine) (#27)  Standing Chops 2x10 reps (#10) B (single handle - one arm at a time)  Shuttle Hip Extensions 2x10 reps (1 black cord)  Side Steps on turf with RTL around ankles x1 lap  Pallof press 2x10 reps, BTB (single handle)  Seated Lumbar Flexion  x10 reps   Kettle Bell Squats with Heels Elevated 2x10 reps (#15 KB) - half foam                 Possible for Next Session: Dry Needling, Motor Control, shuttle press      Pt received Manual Therapy techniques in the form of Dry Needling for x15 min. This was applied to the Lumbar Paraspinals B and B superior glutes. Dry needling was performed to decrease inflammation, increase circulation, decrease pain and restore homeostasis.      50 mm needles with 30 mm gauge were used for all insertion points. Patient prone lying with bolster under LE's and pillow under hips.    Electrical Stimulation was applied this date while needles were in situ x8 minutes. Intensity increased to patient tolerance. No adverse reactions noted.    Patient gave Verbal consent to undergo dry needling. Written consent can be found in the media section in pts chart. All needles were removed and changes in signs and symptoms were assessed. No adverse reactions noted at the conclusion of the treatment.     Patient received a hot pack for 5 minutes to the lumbar spine post treatment session to assist with pain relief and improved tissue extensibility. Patient prone lying with pillow under hips and bolster under the LE's.    Patient Education and Home Exercises     Home Exercises Provided and Patient Education Provided     Education provided:   - Home Exercise Program Review  - Post Exercise Soreness - especially after dry needling  - Maintaining a pain free range of motion with all activities  - Anatomy/Physiology of the Lumbar Spine and the surrounding musculature    Written Home Exercises Provided: Patient instructed to cont prior HEP. Exercises were reviewed and Anthony was able to demonstrate them prior to the end of the session.  Anthony demonstrated good  understanding of the education provided. See EMR under Patient Instructions for exercises provided during therapy sessions    ASSESSMENT     Patient with good tolerance to functional strength  training overall - no exacerbation of symptoms reported. Introduced kettle bell squats with moderate resistance - proper form was emphasized with verbal and visual cues. Dry needling continues to provide a positive result and was thus used again this date. Skin was cleaned pre and post needle insertion/removal with no adverse reactions observed. Will continue to progress functional training in terms of strength, core stability, and motor control while preventing back pain.     Anthony Is progressing well towards his goals.   Pt prognosis is Good.     Pt will continue to benefit from skilled outpatient physical therapy to address the deficits listed in the problem list box on initial evaluation, provide pt/family education and to maximize pt's level of independence in the home and community environment.     Pt's spiritual, cultural and educational needs considered and pt agreeable to plan of care and goals.     Anticipated barriers to physical therapy: work schedule    Goals:   Short Term Goals: 4 weeks   - Patient will demonstrate improved hip abduction strength by at least 1/2 grade via MMT for increased stability and support with functional activities. (progressing, not met)  - Patient will demonstrate improved lumbar range of motion into side bending to the distal thigh for improved independence with ADL's. 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 (progressing, not met)      Long Term Goals: 8 weeks   - Patient will demonstrate improved hip extension strength by at least 1/2 grade via MMT for increased stability and support with  functional activities. (MET: 8/3/22)  - Patient will demonstrate improved lumbar range of motion into extension to the distal thigh for improved independence with ADL's. (progressing, not met)  - Patient will be able to stand with proper posture for at least 2 hours with minimal to no increase in symptoms/pain for increased ability to tolerate household and community based tasks. 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 (MET: 8/3/22)    PLAN     Continue with established POC for improved functional mobility and return to PLOF.    Possible for Next Session: Dry Needling, Motor Control, shuttle press    Valarie Restrepo PT, DPT, Cert. DN

## 2022-08-17 ENCOUNTER — CLINICAL SUPPORT (OUTPATIENT)
Dept: REHABILITATION | Facility: HOSPITAL | Age: 57
End: 2022-08-17
Payer: COMMERCIAL

## 2022-08-17 DIAGNOSIS — R29.898 DECREASED STRENGTH OF LOWER EXTREMITY: Primary | ICD-10-CM

## 2022-08-17 DIAGNOSIS — M53.86 DECREASED ROM OF LUMBAR SPINE: ICD-10-CM

## 2022-08-17 DIAGNOSIS — R68.89 DECREASED FUNCTIONAL ACTIVITY TOLERANCE: ICD-10-CM

## 2022-08-17 PROCEDURE — 97140 MANUAL THERAPY 1/> REGIONS: CPT | Mod: PN

## 2022-08-17 PROCEDURE — 97110 THERAPEUTIC EXERCISES: CPT | Mod: PN

## 2022-08-23 NOTE — PROGRESS NOTES
[facility]   Physical Therapy Treatment Note    Name: Lorenzo Cross  Clinic Number: [id number]    Therapy Diagnosis:   Encounter Diagnoses   Name Primary?    Decreased strength of lower extremity Yes    Decreased ROM of lumbar spine     Decreased functional activity tolerance      Physician: Clifford Sotelo MD    Visit Date: 8/24/2022    Physician: Clifford Sotelo MD     Physician Orders: PT Eval and Treat  Medical Diagnosis from Referral: M62.830 (ICD-10-CM) - Back spasm  Evaluation Date: 6/30/2022  Authorization Period Expiration: 12/31/22  Plan of Care Expiration: 10/3/22  Progress Note Due: 9/3/22  Visit # / Visits authorized: 8 / 20 (1 / 1 Eval) (re-assessed on 8/3/22)  FOTO: 3 / 3 (6/30/22 - IE, 8/3/22, 8/24/22)     Precautions: Essential HTN     PTA Visit #: 0 / 5     Time In: 3:07 PM (pt arrived late)   Time Out: 3:45 PM  Total Billable Time: 38 minutes    SUBJECTIVE     Pt reports: that he is continuing to feel better. Only had one small instance of discomfort over the last week, otherwise he is doing well.     He was compliant with home exercise program.  Response to previous treatment: mild soreness that improved over time  Functional change: decreased pain/symptoms    Pain: 1/10  Location: bilateral back      OBJECTIVE     Objective Measures updated at progress report unless specified.     Limitation/Restriction for FOTO Lumbar Spine Survey     Therapist reviewed FOTO scores for Lorenzo Cross on 8/24/2022.   FOTO documents entered into [employer] - see Media section.     Limitation Score: 25%      Treatment     Anthony received the treatments listed below:      Therapeutic exercises to develop strength, endurance, ROM, flexibility, posture and core stabilization for 23 minutes including:     (NP today - time)  Gastroc Stretch x2 min (10s holds) (Slant Board Level 3)  Standing Shoulder Extensions 3x10 reps (CC Machine) (#20)    Standing Shoulder Rows 3x10 reps (CC Machine)  (#27)  Standing Chops 2x10 reps (#10) B (single handle - one arm at a time)  Shuttle Hip Extensions 2x10 reps (1 black cord)  Side Steps on turf with RTL around ankles x1 lap    Seated Lumbar Flexion x10 reps   Kettle Bell Squats with Heels Elevated 2x10 reps (#15 KB) - half foam                 Possible for Next Session: Dry Needling, Motor Control, shuttle press      Pt received Manual Therapy techniques in the form of Dry Needling for x15 min. This was applied to the Lumbar Paraspinals B and B superior glutes. Dry needling was performed to decrease inflammation, increase circulation, decrease pain and restore homeostasis.      50 mm needles with 30 mm gauge were used for all insertion points. Patient prone lying with bolster under LE's and pillow under hips.    Electrical Stimulation was applied this date while needles were in situ x8 minutes. Intensity increased to patient tolerance. No adverse reactions noted.    Patient gave Verbal consent to undergo dry needling. Written consent can be found in the media section in pts chart. All needles were removed and changes in signs and symptoms were assessed. No adverse reactions noted at the conclusion of the treatment.     Patient received a hot pack for 5 minutes to the lumbar spine post treatment session to assist with pain relief and improved tissue extensibility. Patient prone lying with pillow under hips and bolster under the LE's.    Patient Education and Home Exercises     Home Exercises Provided and Patient Education Provided     Education provided:   - Home Exercise Program Review  - Post Exercise Soreness - especially after dry needling  - Maintaining a pain free range of motion with all activities  - Anatomy/Physiology of the Lumbar Spine and the surrounding musculature    Written Home Exercises Provided: Patient instructed to cont prior HEP. Exercises were reviewed and Anthony was able to demonstrate them prior to the end of the session.  Anthony demonstrated  good  understanding of the education provided. See EMR under Patient Instructions for exercises provided during therapy sessions    ASSESSMENT     Patient continues to tolerate progression of functional strength training well with emphasis on proper core activation to help improve lumbar stability and support. Able to increase reps and or intensity of some exercises with good tolerance. Dry needling was utilized again this date with the specific focus of improving tissue extensibility along the superior glutes and into the lower lumbar musculature. Skin was cleaned pre and post needle insertion/removal with no adverse reactions observed.    Anthony Is progressing well towards his goals.   Pt prognosis is Good.     Pt will continue to benefit from skilled outpatient physical therapy to address the deficits listed in the problem list box on initial evaluation, provide pt/family education and to maximize pt's level of independence in the home and community environment.     Pt's spiritual, cultural and educational needs considered and pt agreeable to plan of care and goals.     Anticipated barriers to physical therapy: work schedule    Goals:   Short Term Goals: 4 weeks   - Patient will demonstrate improved hip abduction strength by at least 1/2 grade via MMT for increased stability and support with functional activities. (progressing, not met)  - Patient will demonstrate improved lumbar range of motion into side bending to the distal thigh for improved independence with ADL's. 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 (progressing, not  met)      Long Term Goals: 8 weeks   - Patient will demonstrate improved hip extension strength by at least 1/2 grade via MMT for increased stability and support with functional activities. (MET: 8/3/22)  - Patient will demonstrate improved lumbar range of motion into extension to the distal thigh for improved independence with ADL's. (progressing, not met)  - Patient will be able to stand with proper posture for at least 2 hours with minimal to no increase in symptoms/pain for increased ability to tolerate household and community based tasks. 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 (MET: 8/3/22)    PLAN     Continue with established POC for improved functional mobility and return to PLOF.    Possible for Next Session: Dry Needling, Motor Control, shuttle press    Valarie Restrepo PT, DPT, Cert. DN

## 2022-08-24 ENCOUNTER — CLINICAL SUPPORT (OUTPATIENT)
Dept: REHABILITATION | Facility: HOSPITAL | Age: 57
End: 2022-08-24
Payer: COMMERCIAL

## 2022-08-24 DIAGNOSIS — R68.89 DECREASED FUNCTIONAL ACTIVITY TOLERANCE: ICD-10-CM

## 2022-08-24 DIAGNOSIS — M53.86 DECREASED ROM OF LUMBAR SPINE: ICD-10-CM

## 2022-08-24 DIAGNOSIS — R29.898 DECREASED STRENGTH OF LOWER EXTREMITY: Primary | ICD-10-CM

## 2022-08-24 PROCEDURE — 97140 MANUAL THERAPY 1/> REGIONS: CPT | Mod: PN

## 2022-08-24 PROCEDURE — 97110 THERAPEUTIC EXERCISES: CPT | Mod: PN

## 2022-08-30 NOTE — PROGRESS NOTES
[facility]   Physical Therapy Treatment Note    Name: Lorenzo Cross  Clinic Number: [id number]    Therapy Diagnosis:   Encounter Diagnoses   Name Primary?    Decreased strength of lower extremity Yes    Decreased ROM of lumbar spine     Decreased functional activity tolerance        Physician: Clifford Sotelo MD    Visit Date: 8/31/2022    Physician: Clifford Sotelo MD     Physician Orders: PT Eval and Treat  Medical Diagnosis from Referral: M62.830 (ICD-10-CM) - Back spasm  Evaluation Date: 6/30/2022  Authorization Period Expiration: 12/31/22  Plan of Care Expiration: 10/3/22  Progress Note Due: 9/3/22  Visit # / Visits authorized: 9 / 20 (1 / 1 Eval) (re-assessed on 8/3/22)  FOTO: 3 / 3 (6/30/22 - IE, 8/3/22, 8/24/22)     Precautions: Essential HTN     PTA Visit #: 0 / 5     Time In: 3:06 PM (pt arrived late)   Time Out: 3:44 PM  Total Billable Time: 38 minutes    SUBJECTIVE     Pt reports: that his back continues to feel better - no pain today. Has been busy at work for the most part.     He was compliant with home exercise program.  Response to previous treatment: mild soreness that improved over time  Functional change: decreased pain/symptoms    Pain: 0/10  Location: bilateral back      OBJECTIVE     Objective Measures updated at progress report unless specified.     Treatment     Anthony received the treatments listed below:      Therapeutic exercises to develop strength, endurance, ROM, flexibility, posture and core stabilization for 23 minutes including:     (NP today - time)  Gastroc Stretch x2 min (10s holds) (Slant Board Level 3)  Standing Shoulder Extensions 3x10 reps (CC Machine) (#20)    Standing Shoulder Rows 3x10 reps (CC Machine) (#27)  Standing Chops 2x10 reps (#10) B (single handle - one arm at a time)  Shuttle Hip Extensions 2x10 reps (1 black cord)  Side Steps on turf with RTL around ankles x1 lap  Seated Lumbar Flexion x10 reps   Kettle Bell Squats with Heels Elevated  2x10 reps (#15 KB) - half foam         Pt received Manual Therapy techniques in the form of Dry Needling for x15 min. This was applied to the Lumbar Paraspinals B and B superior glutes. Dry needling was performed to decrease inflammation, increase circulation, decrease pain and restore homeostasis.      50 mm needles with 30 mm gauge were used for all insertion points. Patient prone lying with bolster under LE's and pillow under hips.    Electrical Stimulation was applied this date while needles were in situ x8 minutes. Intensity increased to patient tolerance. No adverse reactions noted.    Patient gave Verbal consent to undergo dry needling. Written consent can be found in the media section in pts chart. All needles were removed and changes in signs and symptoms were assessed. No adverse reactions noted at the conclusion of the treatment.     Patient received a hot pack for 5 minutes to the lumbar spine post treatment session to assist with pain relief and improved tissue extensibility. Patient prone lying with pillow under hips and bolster under the LE's.    Patient Education and Home Exercises     Home Exercises Provided and Patient Education Provided     Education provided:   - Home Exercise Program Review  - Post Exercise Soreness - especially after dry needling  - Maintaining a pain free range of motion with all activities  - Anatomy/Physiology of the Lumbar Spine and the surrounding musculature    Written Home Exercises Provided: Patient instructed to cont prior HEP. Exercises were reviewed and Anthony was able to demonstrate them prior to the end of the session.  Anthony demonstrated good  understanding of the education provided. See EMR under Patient Instructions for exercises provided during therapy sessions    ASSESSMENT     No exacerbation of symptoms reported today. Able to perform all prescribed therapeutic exercises well. Dry needling targeted the paraspinals along the lumbar spine and the superior glutes.  No adverse reactions observed. Skin was cleaned pre and post needle insertion. Will plan to discharge next session pending the patient's continued improvements.    Anthony Is progressing well towards his goals.   Pt prognosis is Good.     Pt will continue to benefit from skilled outpatient physical therapy to address the deficits listed in the problem list box on initial evaluation, provide pt/family education and to maximize pt's level of independence in the home and community environment.     Pt's spiritual, cultural and educational needs considered and pt agreeable to plan of care and goals.     Anticipated barriers to physical therapy: work schedule    Goals:   Short Term Goals: 4 weeks   - Patient will demonstrate improved hip abduction strength by at least 1/2 grade via MMT for increased stability and support with functional activities. (progressing, not met)  - Patient will demonstrate improved lumbar range of motion into side bending to the distal thigh for improved independence with ADL's. 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 (progressing, not met)      Long Term Goals: 8 weeks   - Patient will demonstrate improved hip extension strength by at least 1/2 grade via MMT for increased stability and support with functional activities. (MET: 8/3/22)  - Patient will demonstrate improved lumbar range of motion into extension to the distal thigh for improved independence with ADL's. (progressing, not met)  - Patient will be able to stand with proper posture for at least 2 hours with minimal to no increase in symptoms/pain for  increased ability to tolerate household and community based tasks. 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 (MET: 8/3/22)    PLAN     Continue with established POC for improved functional mobility and return to PLOF.    Valarie Restrepo, PT, DPT, Cert. DN

## 2022-08-31 ENCOUNTER — CLINICAL SUPPORT (OUTPATIENT)
Dept: REHABILITATION | Facility: HOSPITAL | Age: 57
End: 2022-08-31
Payer: COMMERCIAL

## 2022-08-31 ENCOUNTER — TELEPHONE (OUTPATIENT)
Dept: FAMILY MEDICINE | Facility: CLINIC | Age: 57
End: 2022-08-31
Payer: COMMERCIAL

## 2022-08-31 DIAGNOSIS — R29.898 DECREASED STRENGTH OF LOWER EXTREMITY: Primary | ICD-10-CM

## 2022-08-31 DIAGNOSIS — R68.89 DECREASED FUNCTIONAL ACTIVITY TOLERANCE: ICD-10-CM

## 2022-08-31 DIAGNOSIS — M53.86 DECREASED ROM OF LUMBAR SPINE: ICD-10-CM

## 2022-08-31 PROCEDURE — 97140 MANUAL THERAPY 1/> REGIONS: CPT | Mod: PN

## 2022-08-31 PROCEDURE — 97110 THERAPEUTIC EXERCISES: CPT | Mod: PN

## 2022-08-31 NOTE — TELEPHONE ENCOUNTER
----- Message from Anthony Beebe sent at 8/31/2022  3:52 PM CDT -----  Regarding: pt called  Name of Who is Calling: DIDIER MCKNIGHT [[id number]]      What is the request in detail: pt called wanting to know if he can get a refill on prescription phentermine (ADIPEX-P) 37.5 mg tablet,Please advise      Can the clinic reply by [facility]: No      What Number to Call Back if not in [facility]:[phone]

## 2022-08-31 NOTE — TELEPHONE ENCOUNTER
Patient states he was suppose to get a call back to scheduled the computers were having issues the date he was here.     He states he can do a virtual tomorrow afternoon if posible.    Do you want to place in 2:10 or somewhere else. He cant do the 11:10 I asked.    Needs Adipex refill.    Please advise .

## 2022-09-01 ENCOUNTER — OFFICE VISIT (OUTPATIENT)
Dept: FAMILY MEDICINE | Facility: CLINIC | Age: 57
End: 2022-09-01
Payer: COMMERCIAL

## 2022-09-01 VITALS — DIASTOLIC BLOOD PRESSURE: 80 MMHG | SYSTOLIC BLOOD PRESSURE: 122 MMHG

## 2022-09-01 DIAGNOSIS — E66.9 OBESITY (BMI 30-39.9): ICD-10-CM

## 2022-09-01 DIAGNOSIS — I10 ESSENTIAL HYPERTENSION: Primary | ICD-10-CM

## 2022-09-01 PROCEDURE — 3074F SYST BP LT 130 MM HG: CPT | Mod: CPTII,95,, | Performed by: INTERNAL MEDICINE

## 2022-09-01 PROCEDURE — 1160F RVW MEDS BY RX/DR IN RCRD: CPT | Mod: CPTII,95,, | Performed by: INTERNAL MEDICINE

## 2022-09-01 PROCEDURE — 1159F MED LIST DOCD IN RCRD: CPT | Mod: CPTII,95,, | Performed by: INTERNAL MEDICINE

## 2022-09-01 PROCEDURE — 1160F PR REVIEW ALL MEDS BY PRESCRIBER/CLIN PHARMACIST DOCUMENTED: ICD-10-PCS | Mod: CPTII,95,, | Performed by: INTERNAL MEDICINE

## 2022-09-01 PROCEDURE — 3074F PR MOST RECENT SYSTOLIC BLOOD PRESSURE < 130 MM HG: ICD-10-PCS | Mod: CPTII,95,, | Performed by: INTERNAL MEDICINE

## 2022-09-01 PROCEDURE — 99214 OFFICE O/P EST MOD 30 MIN: CPT | Mod: 95,,, | Performed by: INTERNAL MEDICINE

## 2022-09-01 PROCEDURE — 3079F PR MOST RECENT DIASTOLIC BLOOD PRESSURE 80-89 MM HG: ICD-10-PCS | Mod: CPTII,95,, | Performed by: INTERNAL MEDICINE

## 2022-09-01 PROCEDURE — 3079F DIAST BP 80-89 MM HG: CPT | Mod: CPTII,95,, | Performed by: INTERNAL MEDICINE

## 2022-09-01 PROCEDURE — 99214 PR OFFICE/OUTPT VISIT, EST, LEVL IV, 30-39 MIN: ICD-10-PCS | Mod: 95,,, | Performed by: INTERNAL MEDICINE

## 2022-09-01 PROCEDURE — 1159F PR MEDICATION LIST DOCUMENTED IN MEDICAL RECORD: ICD-10-PCS | Mod: CPTII,95,, | Performed by: INTERNAL MEDICINE

## 2022-09-01 RX ORDER — PHENTERMINE HYDROCHLORIDE 37.5 MG/1
37.5 TABLET ORAL
Qty: 30 TABLET | Refills: 0 | Status: SHIPPED | OUTPATIENT
Start: 2022-09-01 | End: 2022-10-01

## 2022-09-01 NOTE — PROGRESS NOTES
The patient location is: [place]  The chief complaint leading to consultation is: HTN   Visit type: Virtual visit with synchronous audio and video  Total time spent with patient: 12 min  Each patient to whom he or she provides medical services by telemedicine is:  (1) informed of the relationship between the physician and patient and the respective role of any other health care provider with respect to management of the patient; and (2) notified that he or she may decline to receive medical services by telemedicine and may withdraw from such care at any time.    Notes:      Saw Dr Acosta: CT calcium score 50.  Did not feel had hole in septum heart.  MRI not CVA.     Complains of right hip pain relieved with Robaxin     Low back pain improving with PT.      Obesity - lost 7 more lb on Adipex.       Complains of intermittent low back spasm.  Leans forward at restaurant a lot.       DVT - on Eliquis for 3 of 6 months. Dr Acosta      HTN - controlled off medicine. His cuff runs sbp 10 higher.  He wants to check again.     PND - controlled with Flonase and OTC     LUIS - wants to lose weight      Review of Systems      Physical Exam      Assessment:       1. Essential hypertension    2. Obesity (BMI 30-39.9)          Plan:       Essential hypertension    Obesity (BMI 30-39.9)  -     phentermine (ADIPEX-P) 37.5 mg tablet; Take 1 tablet (37.5 mg total) by mouth before breakfast.  Dispense: 30 tablet; Refill: 0          Medication List with Changes/Refills   Current Medications    APIXABAN (ELIQUIS) 5 MG TAB    Eliquis 5 mg tablet   TAKE 1 TABLET BY MOUTH TWICE DAILY    ASPIRIN 81 MG CHEW    Take 1 tablet by mouth once daily.    FLUTICASONE PROPIONATE (FLONASE) 50 MCG/ACTUATION NASAL SPRAY    2 sprays (100 mcg total) by Each Nostril route once daily.    TADALAFIL (CIALIS) 20 MG TAB    Take one tablet by mouth every 36 hours as needed.   Changed and/or Refilled Medications    Modified Medication Previous Medication     PHENTERMINE (ADIPEX-P) 37.5 MG TABLET phentermine (ADIPEX-P) 37.5 mg tablet       Take 1 tablet (37.5 mg total) by mouth before breakfast.    Take 1 tablet (37.5 mg total) by mouth before breakfast.     3/3  Continue current management and monitor.    Counseled on regular exercise, maintenance of a healthy weight, balanced diet rich in fruits/vegetables and lean protein, and avoidance of unhealthy habits like smoking and excessive alcohol intake.   Also, counseled on importance of being compliant with medication, health appointments, diet and exercise.     Follow up in about 4 months (around 1/13/2023).

## 2022-09-07 ENCOUNTER — DOCUMENTATION ONLY (OUTPATIENT)
Dept: REHABILITATION | Facility: HOSPITAL | Age: 57
End: 2022-09-07
Payer: COMMERCIAL

## 2022-09-07 NOTE — PROGRESS NOTES
Patient arrived 20 minutes late for scheduled appointment time. The patient was advised to reschedule appointment in order to properly re-assess and treat in a timely manner. Patient rescheduled appointment with PAR for 10/3/22.    Valarie Restrepo, PT, DPT, Cert. DN

## 2022-10-06 ENCOUNTER — CLINICAL SUPPORT (OUTPATIENT)
Dept: REHABILITATION | Facility: HOSPITAL | Age: 57
End: 2022-10-06
Payer: COMMERCIAL

## 2022-10-06 DIAGNOSIS — M53.86 DECREASED ROM OF LUMBAR SPINE: ICD-10-CM

## 2022-10-06 DIAGNOSIS — R29.898 DECREASED STRENGTH OF LOWER EXTREMITY: Primary | ICD-10-CM

## 2022-10-06 DIAGNOSIS — R68.89 DECREASED FUNCTIONAL ACTIVITY TOLERANCE: ICD-10-CM

## 2022-10-06 PROCEDURE — 97140 MANUAL THERAPY 1/> REGIONS: CPT | Mod: PN

## 2022-10-06 PROCEDURE — 97110 THERAPEUTIC EXERCISES: CPT | Mod: PN

## 2022-10-06 NOTE — PLAN OF CARE
[facility]   Physical Therapy Treatment Note and DC Summary    Name: Lorenzo Cross  Clinic Number: [id number]    Therapy Diagnosis:   Encounter Diagnoses   Name Primary?    Decreased strength of lower extremity Yes    Decreased ROM of lumbar spine     Decreased functional activity tolerance      Physician: Clifford Sotelo MD    Visit Date: 10/6/2022     Physician Orders: PT Eval and Treat  Medical Diagnosis from Referral: M62.830 (ICD-10-CM) - Back spasm  Evaluation Date: 6/30/2022  Authorization Period Expiration: 12/31/22  Plan of Care Expiration: 10/3/22  Progress Note Due: 9/3/22  Visit # / Visits authorized: 10 / 20 (1 / 1 Eval) (re-assessed on 8/3/22)  FOTO: 3 / 3 (6/30/22 - IE, 8/3/22, 8/24/22)     Precautions: Essential HTN     PTA Visit #: 0 / 5    Time In: 3:55 PM   Time Out: 0430 PM  Total Billable Time: 35 minutes    Date of Last visit: 10/6/2022  Total Visits Received: 11    SUBJECTIVE     Pt reports: some tightness in the low back but he hasn't stretched as much as he wanted.     He was compliant with home exercise program.  Response to previous treatment: mild soreness that improved over time  Functional change: decreased pain/symptoms    Pain: 0/10  Location: bilateral back      OBJECTIVE     Observation/Posture: improvements since IE     Lumbar Range of Motion:     Degrees Pain   Flexion WNL    - (tightness and mild discomfort)         Extension WNL    Min discomfort         Left Side Bending Distal lateral thigh min         Right Side Bending Distal lateral thigh min         Left rotation    WNL -         Right Rotation    WNL -               Lower Extremity Strength  Right LE   Left LE     Knee extension: 5/5 Knee extension: 5/5   Knee flexion: 5/5 Knee flexion: 5/5   Hip flexion: 4+/5 Hip flexion: 4+/5   Hip extension:  4+/5 Hip extension: 4+/5   Hip abduction: 4+/5 Hip abduction: 4+/5   Hip adduction: 4+/5 Hip adduction 4+/5   Ankle dorsiflexion: 5/5 Ankle  dorsiflexion: 5/5   Ankle plantarflexion: 5/5 Ankle plantarflexion: 5/5         Special Tests:  -Repeated Flexion: + (felt good, improved symptoms)  -Repeated Ext: -      Neuro Dynamic Testing: negative      Joint Mobility: stiffness in      Palpation: Mild TTP along the PSIS and the lumbar paraspinals B     Sensation: intact B to light touch     Flexibility:               Hamstring Test: WNL    Treatment     Anthony received the treatments listed below:      Therapeutic exercises to develop strength, endurance, ROM, flexibility, posture and core stabilization for 15 minutes including:     Therex time used for reassessment purposes, HEP review, and patient education    Not performed today:  Recumbent Bike x5 min (Seat: 9 ; Level: 4)  Gastroc Stretch x2 min (10s holds) (Slant Board Level 3)  Standing Shoulder Extensions 3x10 reps (CC Machine) (#20)    Standing Shoulder Rows 3x10 reps (CC Machine) (#27)  Standing Chops 2x10 reps (#10) B (single handle - one arm at a time)  Shuttle Hip Extensions 2x10 reps (1 black cord)  Side Steps on turf with RTL around ankles x1 lap  Seated Lumbar Flexion x10 reps   Kettle Bell Squats with Heels Elevated 2x10 reps (#15 KB) - half foam  Pallof press 2x10 reps, BTB (single handle)  Supine Pelvic Tilts 2x10 reps (3 sec holds) + LE Marching with [place] around knees  Dead Bugs 2x10 reps (alternating arm and leg)  Bridging 2x10 reps  Bird Dog x10 reps  Straight Leg Raise 2x10 reps B + TA Activation   Side-Lying Hip Abduction 2x10 reps B + TA Activation  Wall squats 2x10  Seated Stability Ball Roll Outs x2 min (flexion only)  Soleus Stretch x1 min B (10s holds) (slant board level 3)  Long Sitting Hamstring Stretch 3x10s B  Seated Figure 4 Stretch 3x10s B  Wall Slides x10 reps Bilateral Upper Extremities  Open Books with LE drop off x5 reps B       Pt received Manual Therapy techniques in the form of Dry Needling for x15 min. This was applied to the Lumbar Paraspinals B and B superior  glutes. Dry needling was performed to decrease inflammation, increase circulation, decrease pain and restore homeostasis.      (6) 50 mm needles with 0.30 mm gauge were used for all insertion points. Patient prone lying with bolster under LE's and pillow under hips.    Electrical Stimulation was applied this date while needles were in situ x5 minutes. Intensity increased to patient tolerance. No adverse reactions noted.    Patient gave Verbal consent to undergo dry needling. Written consent can be found in the media section in pts chart. All needles were removed and changes in signs and symptoms were assessed. No adverse reactions noted at the conclusion of the treatment.       Patient received a hot pack for 00 minutes to the lumbar spine post treatment session to assist with pain relief and improved tissue extensibility. Patient prone lying with pillow under hips and bolster under the LE's.      Patient Education and Home Exercises     Home Exercises Provided and Patient Education Provided     Education provided:   - Home Exercise Program Review  - Post Exercise Soreness - especially after dry needling  - Maintaining a pain free range of motion with all activities  - Anatomy/Physiology of the Lumbar Spine and the surrounding musculature    Written Home Exercises Provided: Patient instructed to cont prior HEP. Exercises were reviewed and Anthony was able to demonstrate them prior to the end of the session.  Anthony demonstrated good  understanding of the education provided. See EMR under Patient Instructions for exercises provided during therapy sessions    ASSESSMENT     Pt has met all PT goals and will DC to an HEP at this time. He will contact PT if there are any issues in the near future.     Discharge reason: Patient has met all of his/her goals    Discharge FOTO Score: 23%    Pt's spiritual, cultural and educational needs considered and pt agreeable to plan of care and goals.     Anticipated barriers to physical  therapy: work schedule    Goals:   Short Term Goals: 4 weeks   - Patient will demonstrate improved hip abduction strength by at least 1/2 grade via MMT for increased stability and support with functional activities. (met)  - Patient will demonstrate improved lumbar range of motion into side bending to the distal thigh for improved independence with ADL's. 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 (met)      Long Term Goals: 8 weeks   - Patient will demonstrate improved hip extension strength by at least 1/2 grade via MMT for increased stability and support with functional activities. (MET: 8/3/22)  - Patient will demonstrate improved lumbar range of motion into extension to the distal thigh for improved independence with ADL's. (met)  - Patient will be able to stand with proper posture for at least 2 hours with minimal to no increase in symptoms/pain for increased ability to tolerate household and community based tasks. 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 (MET: 8/3/22)    PLAN     This patient is discharged from Physical Therapy.    Mesfin Euceda, PT, DPT, Cert. DN

## 2022-10-06 NOTE — PROGRESS NOTES
[facility]   Physical Therapy Treatment Note and DC Summary    Name: Lorenzo Cross  Clinic Number: [id number]    Therapy Diagnosis:   Encounter Diagnoses   Name Primary?    Decreased strength of lower extremity Yes    Decreased ROM of lumbar spine     Decreased functional activity tolerance      Physician: Clifford Sotelo MD    Visit Date: 10/6/2022     Physician Orders: PT Eval and Treat  Medical Diagnosis from Referral: M62.830 (ICD-10-CM) - Back spasm  Evaluation Date: 6/30/2022  Authorization Period Expiration: 12/31/22  Plan of Care Expiration: 10/3/22  Progress Note Due: 9/3/22  Visit # / Visits authorized: 10 / 20 (1 / 1 Eval) (re-assessed on 8/3/22)  FOTO: 3 / 3 (6/30/22 - IE, 8/3/22, 8/24/22)     Precautions: Essential HTN     PTA Visit #: 0 / 5    Time In: 3:55 PM   Time Out: 0430 PM  Total Billable Time: 35 minutes    Date of Last visit: 10/6/2022  Total Visits Received: 11    SUBJECTIVE     Pt reports: some tightness in the low back but he hasn't stretched as much as he wanted.     He was compliant with home exercise program.  Response to previous treatment: mild soreness that improved over time  Functional change: decreased pain/symptoms    Pain: 0/10  Location: bilateral back      OBJECTIVE     Observation/Posture: improvements since IE     Lumbar Range of Motion:     Degrees Pain   Flexion WNL    - (tightness and mild discomfort)         Extension WNL    Min discomfort         Left Side Bending Distal lateral thigh min         Right Side Bending Distal lateral thigh min         Left rotation    WNL -         Right Rotation    WNL -               Lower Extremity Strength  Right LE   Left LE     Knee extension: 5/5 Knee extension: 5/5   Knee flexion: 5/5 Knee flexion: 5/5   Hip flexion: 4+/5 Hip flexion: 4+/5   Hip extension:  4+/5 Hip extension: 4+/5   Hip abduction: 4+/5 Hip abduction: 4+/5   Hip adduction: 4+/5 Hip adduction 4+/5   Ankle dorsiflexion: 5/5 Ankle  dorsiflexion: 5/5   Ankle plantarflexion: 5/5 Ankle plantarflexion: 5/5         Special Tests:  -Repeated Flexion: + (felt good, improved symptoms)  -Repeated Ext: -      Neuro Dynamic Testing: negative      Joint Mobility: stiffness in      Palpation: Mild TTP along the PSIS and the lumbar paraspinals B     Sensation: intact B to light touch     Flexibility:               Hamstring Test: WNL    Treatment     Anthony received the treatments listed below:      Therapeutic exercises to develop strength, endurance, ROM, flexibility, posture and core stabilization for 15 minutes including:     Therex time used for reassessment purposes, HEP review, and patient education    Not performed today:  Recumbent Bike x5 min (Seat: 9 ; Level: 4)  Gastroc Stretch x2 min (10s holds) (Slant Board Level 3)  Standing Shoulder Extensions 3x10 reps (CC Machine) (#20)    Standing Shoulder Rows 3x10 reps (CC Machine) (#27)  Standing Chops 2x10 reps (#10) B (single handle - one arm at a time)  Shuttle Hip Extensions 2x10 reps (1 black cord)  Side Steps on turf with RTL around ankles x1 lap  Seated Lumbar Flexion x10 reps   Kettle Bell Squats with Heels Elevated 2x10 reps (#15 KB) - half foam  Pallof press 2x10 reps, BTB (single handle)  Supine Pelvic Tilts 2x10 reps (3 sec holds) + LE Marching with [place] around knees  Dead Bugs 2x10 reps (alternating arm and leg)  Bridging 2x10 reps  Bird Dog x10 reps  Straight Leg Raise 2x10 reps B + TA Activation   Side-Lying Hip Abduction 2x10 reps B + TA Activation  Wall squats 2x10  Seated Stability Ball Roll Outs x2 min (flexion only)  Soleus Stretch x1 min B (10s holds) (slant board level 3)  Long Sitting Hamstring Stretch 3x10s B  Seated Figure 4 Stretch 3x10s B  Wall Slides x10 reps Bilateral Upper Extremities  Open Books with LE drop off x5 reps B       Pt received Manual Therapy techniques in the form of Dry Needling for x15 min. This was applied to the Lumbar Paraspinals B and B superior  glutes. Dry needling was performed to decrease inflammation, increase circulation, decrease pain and restore homeostasis.      (6) 50 mm needles with 0.30 mm gauge were used for all insertion points. Patient prone lying with bolster under LE's and pillow under hips.    Electrical Stimulation was applied this date while needles were in situ x5 minutes. Intensity increased to patient tolerance. No adverse reactions noted.    Patient gave Verbal consent to undergo dry needling. Written consent can be found in the media section in pts chart. All needles were removed and changes in signs and symptoms were assessed. No adverse reactions noted at the conclusion of the treatment.       Patient received a hot pack for 00 minutes to the lumbar spine post treatment session to assist with pain relief and improved tissue extensibility. Patient prone lying with pillow under hips and bolster under the LE's.      Patient Education and Home Exercises     Home Exercises Provided and Patient Education Provided     Education provided:   - Home Exercise Program Review  - Post Exercise Soreness - especially after dry needling  - Maintaining a pain free range of motion with all activities  - Anatomy/Physiology of the Lumbar Spine and the surrounding musculature    Written Home Exercises Provided: Patient instructed to cont prior HEP. Exercises were reviewed and Anthony was able to demonstrate them prior to the end of the session.  Anthony demonstrated good  understanding of the education provided. See EMR under Patient Instructions for exercises provided during therapy sessions    ASSESSMENT     Pt has met all PT goals and will DC to an HEP at this time. He will contact PT if there are any issues in the near future.     Discharge reason: Patient has met all of his/her goals    Discharge FOTO Score: 23%    Pt's spiritual, cultural and educational needs considered and pt agreeable to plan of care and goals.     Anticipated barriers to physical  therapy: work schedule    Goals:   Short Term Goals: 4 weeks   - Patient will demonstrate improved hip abduction strength by at least 1/2 grade via MMT for increased stability and support with functional activities. (met)  - Patient will demonstrate improved lumbar range of motion into side bending to the distal thigh for improved independence with ADL's. 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 (met)      Long Term Goals: 8 weeks   - Patient will demonstrate improved hip extension strength by at least 1/2 grade via MMT for increased stability and support with functional activities. (MET: 8/3/22)  - Patient will demonstrate improved lumbar range of motion into extension to the distal thigh for improved independence with ADL's. (met)  - Patient will be able to stand with proper posture for at least 2 hours with minimal to no increase in symptoms/pain for increased ability to tolerate household and community based tasks. 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 (MET: 8/3/22)    PLAN     This patient is discharged from Physical Therapy.    Mesfin Euceda, PT, DPT, Cert. DN

## 2023-11-21 ENCOUNTER — TELEPHONE (OUTPATIENT)
Dept: FAMILY MEDICINE | Facility: CLINIC | Age: 58
End: 2023-11-21

## 2023-11-21 NOTE — TELEPHONE ENCOUNTER
----- Message from Eric Oliver sent at 11/21/2023  2:29 PM CST -----  Regarding: sooner appt  Contact: patient  Type:  Sooner Appointment Request    Caller is requesting a sooner appointment.  Caller declined first available appointment listed below.  Caller will not accept being placed on the waitlist and is requesting a message be sent to doctor.    Name of Caller:  Patient   When is the first available appointment?  12/08  Symptoms:    Would the patient rather a call back or a response via [facility]? Call back  Best Call Back Number:  [phone]  Additional Information:  Pt was discharged from [facility] yesterday and need a hospital f/u appt.

## 2023-11-30 DIAGNOSIS — R09.82 PND (POST-NASAL DRIP): ICD-10-CM

## 2023-11-30 NOTE — TELEPHONE ENCOUNTER
Refill Routing Note   Medication(s) are not appropriate for processing by [facility] for the following reason(s):        Patient not seen by provider within 15 months    [facility] action(s):  Defer               Appointments  past 12m or future 3m with PCP    Date Provider   Last Visit   9/1/2022 Clifford Sotelo MD   Next Visit   12/12/2023 Clifford Sotelo MD   ED visits in past 90 days: 0        Note composed:5:15 PM 11/30/2023

## 2023-11-30 NOTE — TELEPHONE ENCOUNTER
No care due was identified.  Health [facility] Embedded Care Due Messages. Reference number: [id number].   11/30/2023 5:35:13 AM CST

## 2023-12-03 RX ORDER — FLUTICASONE PROPIONATE 50 MCG
2 SPRAY, SUSPENSION (ML) NASAL DAILY
Qty: 48 G | Refills: 0 | Status: SHIPPED | OUTPATIENT
Start: 2023-12-03 | End: 2023-12-12 | Stop reason: SDUPTHER

## 2023-12-05 ENCOUNTER — TELEPHONE (OUTPATIENT)
Dept: FAMILY MEDICINE | Facility: CLINIC | Age: 58
End: 2023-12-05

## 2023-12-05 NOTE — TELEPHONE ENCOUNTER
----- Message from Silvia Hazel sent at 12/5/2023  4:13 PM CST -----  Contact: self  Type:  Patient Returning Call    Who Called:  pt  Who Left Message for Patient:  lisa  Does the patient know what this is regarding?:  yes  Best Call Back Number:  [phone]   Additional Information:  please call

## 2023-12-05 NOTE — TELEPHONE ENCOUNTER
----- Message from Akila Stafford sent at 12/5/2023  9:59 AM CST -----  Contact: pt  Type: Needs Medical Advice  Who Called: pt  Best Call Back Number: [phone]    Additional Information: Pt is calling the office trying to get an appt time for the afternoon.Please call back and advise.

## 2023-12-12 ENCOUNTER — OFFICE VISIT (OUTPATIENT)
Dept: FAMILY MEDICINE | Facility: CLINIC | Age: 58
End: 2023-12-12

## 2023-12-12 ENCOUNTER — TELEPHONE (OUTPATIENT)
Dept: FAMILY MEDICINE | Facility: CLINIC | Age: 58
End: 2023-12-12

## 2023-12-12 VITALS
OXYGEN SATURATION: 96 % | DIASTOLIC BLOOD PRESSURE: 86 MMHG | BODY MASS INDEX: 33.35 KG/M2 | TEMPERATURE: 98 F | SYSTOLIC BLOOD PRESSURE: 128 MMHG | WEIGHT: 239.06 LBS | HEART RATE: 90 BPM

## 2023-12-12 DIAGNOSIS — Z00.00 ROUTINE PHYSICAL EXAMINATION: ICD-10-CM

## 2023-12-12 DIAGNOSIS — K75.81 NASH (NONALCOHOLIC STEATOHEPATITIS): ICD-10-CM

## 2023-12-12 DIAGNOSIS — I10 ESSENTIAL HYPERTENSION: Primary | ICD-10-CM

## 2023-12-12 DIAGNOSIS — E66.9 OBESITY (BMI 30-39.9): ICD-10-CM

## 2023-12-12 DIAGNOSIS — R09.82 PND (POST-NASAL DRIP): ICD-10-CM

## 2023-12-12 DIAGNOSIS — M25.511 ACUTE PAIN OF RIGHT SHOULDER: ICD-10-CM

## 2023-12-12 PROCEDURE — 99213 OFFICE O/P EST LOW 20 MIN: CPT | Mod: PBBFAC,PO | Performed by: INTERNAL MEDICINE

## 2023-12-12 PROCEDURE — 99214 PR OFFICE/OUTPT VISIT, EST, LEVL IV, 30-39 MIN: ICD-10-PCS | Mod: S$PBB,,, | Performed by: INTERNAL MEDICINE

## 2023-12-12 PROCEDURE — 99214 OFFICE O/P EST MOD 30 MIN: CPT | Mod: S$PBB,,, | Performed by: INTERNAL MEDICINE

## 2023-12-12 PROCEDURE — 99999 PR PBB SHADOW E&M-EST. PATIENT-LVL III: ICD-10-PCS | Mod: PBBFAC,,, | Performed by: INTERNAL MEDICINE

## 2023-12-12 PROCEDURE — 99999 PR PBB SHADOW E&M-EST. PATIENT-LVL III: CPT | Mod: PBBFAC,,, | Performed by: INTERNAL MEDICINE

## 2023-12-12 RX ORDER — LOSARTAN POTASSIUM 50 MG/1
50 TABLET ORAL DAILY
Qty: 90 TABLET | Refills: 4 | Status: SHIPPED | OUTPATIENT
Start: 2023-12-12

## 2023-12-12 RX ORDER — PHENTERMINE HYDROCHLORIDE 37.5 MG/1
37.5 TABLET ORAL
Qty: 30 TABLET | Refills: 0 | Status: SHIPPED | OUTPATIENT
Start: 2023-12-12 | End: 2024-01-11

## 2023-12-12 RX ORDER — FLUTICASONE PROPIONATE 50 MCG
2 SPRAY, SUSPENSION (ML) NASAL DAILY
Qty: 48 G | Refills: 4 | Status: SHIPPED | OUTPATIENT
Start: 2023-12-12

## 2023-12-12 RX ORDER — LOSARTAN POTASSIUM 25 MG/1
25 TABLET ORAL
COMMUNITY
Start: 2023-03-29 | End: 2023-12-12

## 2023-12-12 RX ORDER — CELECOXIB 200 MG/1
200 CAPSULE ORAL DAILY PRN
Qty: 30 CAPSULE | Refills: 3 | Status: SHIPPED | OUTPATIENT
Start: 2023-12-12

## 2023-12-12 RX ORDER — ASPIRIN 325 MG
325 TABLET ORAL
COMMUNITY
End: 2023-12-12

## 2023-12-12 NOTE — PROGRESS NOTES
Subjective:       Patient ID: Lorenzo Cross is a 58 y.o. male.  Chief Complaint: No chief complaint on file.     HPI    Saw Dr Acosta: CT calcium score 50.  Did not feel had hole in septum heart.  MRI not CVA.          Obesity - would like help       Complains of right shoulder pain.       DVT - on Eliquis for 3 of 6 months. Dr Acosta      HTN - borderline controlled dbp. His cuff runs sbp 10 higher.       PND - controlled with Flonase and OTC     LUIS - wants to lose weight         Assessment:       1. Essential hypertension    2. PND (post-nasal drip)    3. Routine physical examination    4. Acute pain of right shoulder    5. LUIS (nonalcoholic steatohepatitis)    6. Obesity (BMI 30-39.9)        Plan:       Essential hypertension  -     losartan (COZAAR) 50 MG tablet; Take 1 tablet (50 mg total) by mouth once daily.  Dispense: 90 tablet; Refill: 4    PND (post-nasal drip)  -     fluticasone propionate (FLONASE) 50 mcg/actuation nasal spray; 2 sprays (100 mcg total) by Each Nostril route once daily.  Dispense: 48 g; Refill: 4    Routine physical examination  -     Lipid Panel; Future; Expected date: 12/12/2023  -     PSA, Screening; Future; Expected date: 12/12/2023  -     Hemoglobin A1C; Future; Expected date: 12/12/2023    Acute pain of right shoulder  -     celecoxib (CELEBREX) 200 MG capsule; Take 1 capsule (200 mg total) by mouth daily as needed for Pain.  Dispense: 30 capsule; Refill: 3    LUIS (nonalcoholic steatohepatitis)    Obesity (BMI 30-39.9)  -     phentermine (ADIPEX-P) 37.5 mg tablet; Take 1 tablet (37.5 mg total) by mouth before breakfast.  Dispense: 30 tablet; Refill: 0          1/3  Continue current management and monitor.  Other diagnoses were reviewed and found stable and will continue to monitor.  Counseled on regular exercise, maintenance of a healthy weight, balanced diet rich in fruits/vegetables and lean protein, and avoidance of unhealthy habits like smoking and excessive alcohol  "intake.   Also, counseled on importance of being compliant with medication, health appointments, diet and exercise.     Follow up in about 4 weeks (around 1/9/2024). Htn, adipex      Medication List with Changes/Refills   New Medications    CELECOXIB (CELEBREX) 200 MG CAPSULE    Take 1 capsule (200 mg total) by mouth daily as needed for Pain.    PHENTERMINE (ADIPEX-P) 37.5 MG TABLET    Take 1 tablet (37.5 mg total) by mouth before breakfast.   Current Medications    TADALAFIL (CIALIS) 20 MG TAB    Take one tablet by mouth every 36 hours as needed.   Changed and/or Refilled Medications    Modified Medication Previous Medication    FLUTICASONE PROPIONATE (FLONASE) 50 MCG/ACTUATION NASAL SPRAY fluticasone propionate (FLONASE) 50 mcg/actuation nasal spray       2 sprays (100 mcg total) by Each Nostril route once daily.    2 sprays (100 mcg total) by Each Nostril route once daily.    LOSARTAN (COZAAR) 50 MG TABLET losartan (COZAAR) 50 MG tablet       Take 1 tablet (50 mg total) by mouth once daily.    Take 50 mg by mouth once daily.   Discontinued Medications    APIXABAN (ELIQUIS) 5 MG TAB    Eliquis 5 mg tablet   TAKE 1 TABLET BY MOUTH TWICE DAILY    ASPIRIN 325 MG TABLET    Take 325 mg by mouth.    ASPIRIN 81 MG CHEW    Take 1 tablet by mouth once daily.    LOSARTAN (COZAAR) 25 MG TABLET    Take 25 mg by mouth.       BP Readings from Last 3 Encounters:   12/12/23 128/86   09/01/22 122/80   08/02/22 122/80     No results found for: "HGBA1C"  No results found for: "TSH"  Lab Results   Component Value Date    LDLCALC 75.4 09/16/2020    LDLCALC 83.4 08/23/2019    LDLCALC 82.2 01/22/2018     Lab Results   Component Value Date    TRIG 143 09/16/2020    TRIG 108 08/23/2019    TRIG 159 (H) 01/22/2018     Wt Readings from Last 3 Encounters:   12/12/23 108.4 kg (239 lb 1.4 oz)   08/02/22 109.2 kg (240 lb 11.9 oz)   06/27/22 113.4 kg (250 lb)     Lab Results   Component Value Date    HGB 14.2 09/16/2020    HCT 42.3 09/16/2020    " WBC 6.14 09/16/2020    ALT 31 11/20/2023    AST 27 11/20/2023     (L) 11/20/2023    K 3.8 11/20/2023    CREATININE 0.78 (L) 11/20/2023    PSA 1.2 09/16/2020           Review of Systems        Objective:      Vitals:    12/12/23 0951   BP: 128/86   Pulse:    Temp:      Physical Exam  Vitals reviewed.   Constitutional:       Appearance: Normal appearance.   Eyes:      Conjunctiva/sclera: Conjunctivae normal.   Cardiovascular:      Rate and Rhythm: Normal rate.   Pulmonary:      Effort: Pulmonary effort is normal.      Breath sounds: Normal breath sounds.   Musculoskeletal:      Cervical back: Normal range of motion.      Comments: Normal ROM bilateral    Skin:     General: Skin is warm and dry.   Neurological:      Mental Status: He is alert.      Cranial Nerves: Cranial nerve deficit: grossly intact.   Psychiatric:      Comments: Alert and orientated

## 2024-01-04 ENCOUNTER — PATIENT MESSAGE (OUTPATIENT)
Dept: OTHER | Facility: OTHER | Age: 59
End: 2024-01-04

## 2024-01-09 ENCOUNTER — TELEPHONE (OUTPATIENT)
Dept: FAMILY MEDICINE | Facility: CLINIC | Age: 59
End: 2024-01-09

## 2024-01-09 NOTE — TELEPHONE ENCOUNTER
----- Message from Deepa Valdivia MA sent at 1/9/2024  8:53 AM CST -----  Type: Needs Medical Advice  Who Called:  pt   Best Call Back Number: [phone]    Additional Information: please advise pt need to speak with someone from the office

## 2024-01-12 DIAGNOSIS — N52.9 ERECTILE DYSFUNCTION, UNSPECIFIED ERECTILE DYSFUNCTION TYPE: ICD-10-CM

## 2024-01-13 NOTE — TELEPHONE ENCOUNTER
Care Due:                  Date            Visit Type   Department     Provider  --------------------------------------------------------------------------------                                MYCHART                              FOLLOWUP/OF  [facility]  Last Visit: 12-      FICE VISIT   MEDICINE       IESHA LEVI  Next Visit: None Scheduled  None         None Found                                                            Last  Test          Frequency    Reason                     Performed    Due Date  --------------------------------------------------------------------------------    CBC.........  12 months..  celecoxib................  Not Found    Overdue    CMP.........  12 months..  celecoxib, losartan......  Not Found    Overdue    [employer] Embedded Care Due Messages. Reference number: [id number].   1/12/2024 11:38:33 PM CST

## 2024-01-16 RX ORDER — TADALAFIL 20 MG/1
TABLET ORAL
Qty: 30 TABLET | Refills: 3 | Status: SHIPPED | OUTPATIENT
Start: 2024-01-16

## 2024-01-17 ENCOUNTER — OFFICE VISIT (OUTPATIENT)
Dept: FAMILY MEDICINE | Facility: CLINIC | Age: 59
End: 2024-01-17

## 2024-01-17 VITALS — SYSTOLIC BLOOD PRESSURE: 124 MMHG | DIASTOLIC BLOOD PRESSURE: 79 MMHG

## 2024-01-17 DIAGNOSIS — I10 ESSENTIAL HYPERTENSION: Primary | ICD-10-CM

## 2024-01-17 DIAGNOSIS — M25.511 ACUTE PAIN OF RIGHT SHOULDER: ICD-10-CM

## 2024-01-17 DIAGNOSIS — E66.9 OBESITY (BMI 30-39.9): ICD-10-CM

## 2024-01-17 PROCEDURE — 99214 OFFICE O/P EST MOD 30 MIN: CPT | Mod: 95,,, | Performed by: INTERNAL MEDICINE

## 2024-01-17 RX ORDER — PHENTERMINE HYDROCHLORIDE 37.5 MG/1
37.5 TABLET ORAL
Qty: 30 TABLET | Refills: 0 | Status: SHIPPED | OUTPATIENT
Start: 2024-01-17 | End: 2024-01-22

## 2024-01-17 NOTE — PROGRESS NOTES
The patient location is: [place]  The chief complaint leading to consultation is: HTN  Visit type: Virtual visit with synchronous audio and video  Total time spent with patient: 10 m  Each patient to whom he or she provides medical services by telemedicine is:  (1) informed of the relationship between the physician and patient and the respective role of any other health care provider with respect to management of the patient; and (2) notified that he or she may decline to receive medical services by telemedicine and may withdraw from such care at any time.    Notes:     Saw Dr Acosta: CT calcium score 50.  Did not feel had hole in septum heart.  MRI not CVA.           Obesity - would like help; lost 9 lb on Adipex = 130      Complains of right shoulder pain.  home PT helps      DVT - on Eliquis for 3 of 6 months. Dr Aocsta      HTN - controlled. His cuff runs sbp 10 higher.       PND - controlled with Flonase and OTC     LUIS - wants to lose weight       Review of Systems      Physical Exam      Assessment:       1. Essential hypertension    2. Obesity (BMI 30-39.9)    3. Acute pain of right shoulder        Plan:       Essential hypertension    Obesity (BMI 30-39.9)  -     phentermine (ADIPEX-P) 37.5 mg tablet; Take 1 tablet (37.5 mg total) by mouth before breakfast.  Dispense: 30 tablet; Refill: 0    Acute pain of right shoulder            Medication List with Changes/Refills   New Medications    PHENTERMINE (ADIPEX-P) 37.5 MG TABLET    Take 1 tablet (37.5 mg total) by mouth before breakfast.   Current Medications    CELECOXIB (CELEBREX) 200 MG CAPSULE    Take 1 capsule (200 mg total) by mouth daily as needed for Pain.    FLUTICASONE PROPIONATE (FLONASE) 50 MCG/ACTUATION NASAL SPRAY    2 sprays (100 mcg total) by Each Nostril route once daily.    LOSARTAN (COZAAR) 50 MG TABLET    Take 1 tablet (50 mg total) by mouth once daily.    TADALAFIL (CIALIS) 20 MG TAB    Take one tablet by mouth every 36 hours as needed.      2/3  Will message me for PT if wants.    Continue current management and monitor.    Counseled on regular exercise, maintenance of a healthy weight, balanced diet rich in fruits/vegetables and lean protein, and avoidance of unhealthy habits like smoking and excessive alcohol intake.   Also, counseled on importance of being compliant with medication, health appointments, diet and exercise.     Follow up in about 20 days (around 2/6/2024).  labs

## 2024-01-18 DIAGNOSIS — E66.9 OBESITY (BMI 30-39.9): ICD-10-CM

## 2024-01-22 RX ORDER — PHENTERMINE HYDROCHLORIDE 37.5 MG/1
37.5 TABLET ORAL
Qty: 30 TABLET | Refills: 0 | Status: SHIPPED | OUTPATIENT
Start: 2024-01-22 | End: 2024-02-06 | Stop reason: SDUPTHER

## 2024-02-06 ENCOUNTER — TELEPHONE (OUTPATIENT)
Dept: FAMILY MEDICINE | Facility: CLINIC | Age: 59
End: 2024-02-06

## 2024-02-06 ENCOUNTER — OFFICE VISIT (OUTPATIENT)
Dept: FAMILY MEDICINE | Facility: CLINIC | Age: 59
End: 2024-02-06

## 2024-02-06 VITALS — SYSTOLIC BLOOD PRESSURE: 122 MMHG | DIASTOLIC BLOOD PRESSURE: 73 MMHG

## 2024-02-06 DIAGNOSIS — E66.9 OBESITY (BMI 30-39.9): ICD-10-CM

## 2024-02-06 DIAGNOSIS — I10 ESSENTIAL HYPERTENSION: Primary | ICD-10-CM

## 2024-02-06 PROCEDURE — 99214 OFFICE O/P EST MOD 30 MIN: CPT | Mod: 95,,, | Performed by: INTERNAL MEDICINE

## 2024-02-06 RX ORDER — PHENTERMINE HYDROCHLORIDE 37.5 MG/1
37.5 TABLET ORAL
Qty: 30 TABLET | Refills: 0 | Status: SHIPPED | OUTPATIENT
Start: 2024-02-06

## 2024-02-06 NOTE — PROGRESS NOTES
The patient location is: [place]  The chief complaint leading to consultation is: HTN  Visit type: Virtual visit with synchronous audio and video  Total time spent with patient: 10 m  Each patient to whom he or she provides medical services by telemedicine is:  (1) informed of the relationship between the physician and patient and the respective role of any other health care provider with respect to management of the patient; and (2) notified that he or she may decline to receive medical services by telemedicine and may withdraw from such care at any time.    Notes:       Saw Dr Acosta: CT calcium score 50.  Did not feel had hole in septum heart.  MRI not CVA.           Obesity - would like help; lost 2 lb on Adipex = 228     Complains of right shoulder pain.  home PT helps      DVT - on Eliquis. Dr Acosta      HTN - controlled. His cuff runs sbp 10 higher.       PND - controlled with Flonase and OTC     LUIS - trying to lose weight          Review of Systems      Physical Exam      Assessment:       1. Essential hypertension    2. Obesity (BMI 30-39.9)        Plan:       Essential hypertension    Obesity (BMI 30-39.9)  -     phentermine (ADIPEX-P) 37.5 mg tablet; Take 1 tablet (37.5 mg total) by mouth before breakfast.  Dispense: 30 tablet; Refill: 0            Medication List with Changes/Refills   Current Medications    CELECOXIB (CELEBREX) 200 MG CAPSULE    Take 1 capsule (200 mg total) by mouth daily as needed for Pain.    FLUTICASONE PROPIONATE (FLONASE) 50 MCG/ACTUATION NASAL SPRAY    2 sprays (100 mcg total) by Each Nostril route once daily.    LOSARTAN (COZAAR) 50 MG TABLET    Take 1 tablet (50 mg total) by mouth once daily.    TADALAFIL (CIALIS) 20 MG TAB    Take one tablet by mouth every 36 hours as needed.   Changed and/or Refilled Medications    Modified Medication Previous Medication    PHENTERMINE (ADIPEX-P) 37.5 MG TABLET phentermine (ADIPEX-P) 37.5 mg tablet       Take 1 tablet (37.5 mg total) by mouth  before breakfast.    Take 1 tablet by mouth before breakfast.       3/3    Continue current management and monitor.    Counseled on regular exercise, maintenance of a healthy weight, balanced diet rich in fruits/vegetables and lean protein, and avoidance of unhealthy habits like smoking and excessive alcohol intake.   Also, counseled on importance of being compliant with medication, health appointments, diet and exercise.     Follow up in about 22 weeks (around 7/9/2024).  He will get labs previously ordered done    Answers submitted by the patient for this visit:  Cough Questionnaire (Submitted on 2/6/2024)  Chief Complaint: Cough  Chronicity: recurrent  Onset: today  Progression since onset: rapidly worsening  Frequency: constantly  Cough characteristics: non-productive  ear congestion: No  heartburn: No  hemoptysis: No  nasal congestion: No  sweats: No  weight loss: No  Aggravated by: nothing  Risk factors for lung disease: animal exposure  asthma: No  bronchiectasis: No  bronchitis: No  COPD: No  emphysema: No  pneumonia: No  Treatments tried: nothing  Improvement on treatment: mild

## 2024-04-15 DIAGNOSIS — M25.511 ACUTE PAIN OF RIGHT SHOULDER: ICD-10-CM

## 2024-04-15 RX ORDER — CELECOXIB 200 MG/1
200 CAPSULE ORAL DAILY PRN
Qty: 30 CAPSULE | Refills: 11 | Status: SHIPPED | OUTPATIENT
Start: 2024-04-15

## 2024-04-15 NOTE — TELEPHONE ENCOUNTER
Care Due:                  Date            Visit Type   Department     Provider  --------------------------------------------------------------------------------                                ESTABLISHED                              PATIENT -    [facility]  Last Visit: 02-      VIRTUAL      MEDICINE       IESHA LEVI                              EP -                              PRIMARY      [facility]  Next Visit: 07-      CARE ([facility])   MEDICINE       IESHA LEVI                                                            Last  Test          Frequency    Reason                     Performed    Due Date  --------------------------------------------------------------------------------    CBC.........  12 months..  celecoxib................  Not Found    Overdue    CMP.........  12 months..  celecoxib, losartan......  Not Found    Overdue    [employer] Embedded Care Due Messages. Reference number: [id number].   4/15/2024 1:30:39 AM CDT

## 2024-07-11 ENCOUNTER — TELEPHONE (OUTPATIENT)
Dept: FAMILY MEDICINE | Facility: CLINIC | Age: 59
End: 2024-07-11
Payer: COMMERCIAL

## 2024-07-11 DIAGNOSIS — Z00.00 ROUTINE PHYSICAL EXAMINATION: Primary | ICD-10-CM

## 2024-07-11 NOTE — TELEPHONE ENCOUNTER
Patient will contact the lab in [employer] to see if he can have his labs done there. Tomorrow earlier than the 11:20

## 2024-07-11 NOTE — TELEPHONE ENCOUNTER
----- Message from Wale Kent sent at 7/11/2024  7:39 AM CDT -----  Type: Needs Medical Advice    Who Called:  Pt    Best Call Back Number: [phone]    Additional Information: Pt wants to request lab work and speak with nurse about lab work and when it would be ready. Please call back to advise, Thanks!

## 2024-07-16 ENCOUNTER — OFFICE VISIT (OUTPATIENT)
Dept: FAMILY MEDICINE | Facility: CLINIC | Age: 59
End: 2024-07-16
Payer: COMMERCIAL

## 2024-07-16 VITALS
HEIGHT: 71 IN | BODY MASS INDEX: 32.44 KG/M2 | TEMPERATURE: 98 F | DIASTOLIC BLOOD PRESSURE: 74 MMHG | HEART RATE: 86 BPM | SYSTOLIC BLOOD PRESSURE: 126 MMHG | WEIGHT: 231.69 LBS | OXYGEN SATURATION: 97 %

## 2024-07-16 DIAGNOSIS — Z71.85 VACCINE COUNSELING: ICD-10-CM

## 2024-07-16 DIAGNOSIS — Z00.00 ROUTINE PHYSICAL EXAMINATION: Primary | ICD-10-CM

## 2024-07-16 DIAGNOSIS — I10 ESSENTIAL HYPERTENSION: ICD-10-CM

## 2024-07-16 DIAGNOSIS — E66.9 OBESITY (BMI 30-39.9): ICD-10-CM

## 2024-07-16 DIAGNOSIS — K75.81 NASH (NONALCOHOLIC STEATOHEPATITIS): ICD-10-CM

## 2024-07-16 DIAGNOSIS — M62.830 BACK SPASM: ICD-10-CM

## 2024-07-16 PROCEDURE — 1160F RVW MEDS BY RX/DR IN RCRD: CPT | Mod: CPTII,S$GLB,, | Performed by: INTERNAL MEDICINE

## 2024-07-16 PROCEDURE — 1159F MED LIST DOCD IN RCRD: CPT | Mod: CPTII,S$GLB,, | Performed by: INTERNAL MEDICINE

## 2024-07-16 PROCEDURE — 3078F DIAST BP <80 MM HG: CPT | Mod: CPTII,S$GLB,, | Performed by: INTERNAL MEDICINE

## 2024-07-16 PROCEDURE — 99999 PR PBB SHADOW E&M-EST. PATIENT-LVL IV: CPT | Mod: PBBFAC,,, | Performed by: INTERNAL MEDICINE

## 2024-07-16 PROCEDURE — 3074F SYST BP LT 130 MM HG: CPT | Mod: CPTII,S$GLB,, | Performed by: INTERNAL MEDICINE

## 2024-07-16 PROCEDURE — 99396 PREV VISIT EST AGE 40-64: CPT | Mod: S$GLB,,, | Performed by: INTERNAL MEDICINE

## 2024-07-16 PROCEDURE — 3008F BODY MASS INDEX DOCD: CPT | Mod: CPTII,S$GLB,, | Performed by: INTERNAL MEDICINE

## 2024-07-16 RX ORDER — PHENTERMINE HYDROCHLORIDE 37.5 MG/1
37.5 TABLET ORAL
Qty: 30 TABLET | Refills: 0 | Status: SHIPPED | OUTPATIENT
Start: 2024-07-16 | End: 2024-08-15

## 2024-07-16 RX ORDER — METHOCARBAMOL 750 MG/1
750 TABLET, FILM COATED ORAL 4 TIMES DAILY PRN
Qty: 40 TABLET | Refills: 4 | Status: SHIPPED | OUTPATIENT
Start: 2024-07-16 | End: 2024-07-26

## 2024-07-16 NOTE — PROGRESS NOTES
Subjective:       Patient ID: Lorenzo Cross is a 59 y.o. male.  Chief Complaint: Annual Exam     HPI    Here for routine health maintenance.      Saw Dr Acosta: CT calcium score 50.  Did not feel had hole in septum heart.  MRI not CVA.     Obesity - would like help;      Complains of back tension.  Bends at work.       DVT - on 325 ASA; off Eliquis. Dr Acosta      HTN - controlled. His cuff runs sbp 10 higher.       PND - controlled with Flonase and OTC     LUIS - trying to lose weight     Assessment:       1. Routine physical examination    2. Essential hypertension    3. LUIS (nonalcoholic steatohepatitis)    4. Vaccine counseling    5. Obesity (BMI 30-39.9)    6. Back spasm        Plan:       Routine physical examination  -     CBC Auto Differential; Future; Expected date: 07/16/2024  -     Comprehensive Metabolic Panel; Future; Expected date: 07/16/2024  -     Lipid Panel; Future; Expected date: 07/16/2024  -     PSA, Screening; Future; Expected date: 07/16/2024    Essential hypertension    LUIS (nonalcoholic steatohepatitis)    Vaccine counseling  -     DIPH,PERTUSS(ACEL),TET VAC(PF)(ADULT)(ADACEL)(TDaP)    Obesity (BMI 30-39.9)  -     phentermine (ADIPEX-P) 37.5 mg tablet; Take 1 tablet (37.5 mg total) by mouth before breakfast.  Dispense: 30 tablet; Refill: 0    Back spasm  -     methocarbamoL (ROBAXIN) 750 MG Tab; Take 1 tablet (750 mg total) by mouth 4 (four) times daily as needed (back tension).  Dispense: 40 tablet; Refill: 4            Wellness reviewed    1/3  Home PT  Continue current management and monitor.  Other diagnoses were reviewed and found stable and will continue to monitor.  Counseled on regular exercise, maintenance of a healthy weight, balanced diet rich in fruits/vegetables and lean protein, and avoidance of unhealthy habits like smoking and excessive alcohol intake.   Also, counseled on importance of being compliant with medication, health appointments, diet and exercise.     Follow  "up in about 27 days (around 8/12/2024).      Medication List with Changes/Refills   New Medications    METHOCARBAMOL (ROBAXIN) 750 MG TAB    Take 1 tablet (750 mg total) by mouth 4 (four) times daily as needed (back tension).    PHENTERMINE (ADIPEX-P) 37.5 MG TABLET    Take 1 tablet (37.5 mg total) by mouth before breakfast.   Current Medications    CELECOXIB (CELEBREX) 200 MG CAPSULE    TAKE 1 CAPSULE BY MOUTH ONCE DAILY AS NEEDED    FLUTICASONE PROPIONATE (FLONASE) 50 MCG/ACTUATION NASAL SPRAY    2 sprays (100 mcg total) by Each Nostril route once daily.    LOSARTAN (COZAAR) 50 MG TABLET    Take 1 tablet (50 mg total) by mouth once daily.    PHENTERMINE (ADIPEX-P) 37.5 MG TABLET    Take 1 tablet (37.5 mg total) by mouth before breakfast.    TADALAFIL (CIALIS) 20 MG TAB    Take one tablet by mouth every 36 hours as needed.       BP Readings from Last 3 Encounters:   07/16/24 126/74   02/06/24 122/73   01/17/24 124/79     No results found for: "HGBA1C"  No results found for: "TSH"  Lab Results   Component Value Date    LDLCALC 75.4 09/16/2020    LDLCALC 83.4 08/23/2019    LDLCALC 82.2 01/22/2018     Lab Results   Component Value Date    TRIG 143 09/16/2020    TRIG 108 08/23/2019    TRIG 159 (H) 01/22/2018     Wt Readings from Last 3 Encounters:   07/16/24 105.1 kg (231 lb 11.3 oz)   12/12/23 108.4 kg (239 lb 1.4 oz)   08/02/22 109.2 kg (240 lb 11.9 oz)     Lab Results   Component Value Date    HGB 14.2 09/16/2020    HCT 42.3 09/16/2020    WBC 6.14 09/16/2020    ALT 31 11/20/2023    AST 27 11/20/2023     (L) 11/20/2023    K 3.8 11/20/2023    CREATININE 0.78 (L) 11/20/2023    PSA 1.2 09/16/2020           Review of Systems   Constitutional:  Negative for diaphoresis and fever.   HENT:  Negative for drooling and nosebleeds.    Eyes:  Negative for discharge and redness.   Respiratory:  Negative for apnea and choking.    Cardiovascular:  Negative for chest pain and palpitations.   Gastrointestinal:  Negative for " abdominal pain and nausea.   Musculoskeletal:  Positive for back pain.   Skin:  Negative for color change.   Neurological:  Negative for seizures and syncope.   Psychiatric/Behavioral:  Negative for behavioral problems.            Objective:      Vitals:    07/16/24 1431   BP: 126/74   Pulse: 86   Temp: 97.8 °F (36.6 °C)     Physical Exam  Vitals reviewed.   Eyes:      Conjunctiva/sclera: Conjunctivae normal.   Neck:      Thyroid: No thyromegaly.      Trachea: Trachea normal.   Cardiovascular:      Rate and Rhythm: Normal rate.      Comments: Edema negative  Pulmonary:      Effort: Pulmonary effort is normal.      Breath sounds: Normal breath sounds.   Abdominal:      General: Bowel sounds are normal.      Palpations: Abdomen is soft. There is no hepatomegaly.   Musculoskeletal:      Cervical back: Normal range of motion.   Skin:     General: Skin is warm and dry.   Neurological:      Mental Status: He is alert.      Comments: DTR decreased bilateral   Psychiatric:      Comments: Alert and Oriented

## 2024-07-17 ENCOUNTER — LAB VISIT (OUTPATIENT)
Dept: LAB | Facility: HOSPITAL | Age: 59
End: 2024-07-17
Attending: INTERNAL MEDICINE
Payer: COMMERCIAL

## 2024-07-17 DIAGNOSIS — Z00.00 ROUTINE PHYSICAL EXAMINATION: ICD-10-CM

## 2024-07-17 LAB
ALBUMIN SERPL BCP-MCNC: 4.1 G/DL (ref 3.5–5.2)
ALP SERPL-CCNC: 84 U/L (ref 55–135)
ALT SERPL W/O P-5'-P-CCNC: 25 U/L (ref 10–44)
ANION GAP SERPL CALC-SCNC: 8 MMOL/L (ref 8–16)
AST SERPL-CCNC: 24 U/L (ref 10–40)
BASOPHILS # BLD AUTO: 0.04 K/UL (ref 0–0.2)
BASOPHILS NFR BLD: 0.5 % (ref 0–1.9)
BILIRUB SERPL-MCNC: 0.6 MG/DL (ref 0.1–1)
BUN SERPL-MCNC: 14 MG/DL (ref 6–20)
CALCIUM SERPL-MCNC: 8.8 MG/DL (ref 8.7–10.5)
CHLORIDE SERPL-SCNC: 108 MMOL/L (ref 95–110)
CHOLEST SERPL-MCNC: 154 MG/DL (ref 120–199)
CHOLEST/HDLC SERPL: 3.6 {RATIO} (ref 2–5)
CO2 SERPL-SCNC: 24 MMOL/L (ref 23–29)
COMPLEXED PSA SERPL-MCNC: 2.2 NG/ML (ref 0–4)
CREAT SERPL-MCNC: 0.8 MG/DL (ref 0.5–1.4)
DIFFERENTIAL METHOD BLD: NORMAL
EOSINOPHIL # BLD AUTO: 0.1 K/UL (ref 0–0.5)
EOSINOPHIL NFR BLD: 1.7 % (ref 0–8)
ERYTHROCYTE [DISTWIDTH] IN BLOOD BY AUTOMATED COUNT: 13.9 % (ref 11.5–14.5)
EST. GFR  (NO RACE VARIABLE): >60 ML/MIN/1.73 M^2
GLUCOSE SERPL-MCNC: 100 MG/DL (ref 70–110)
HCT VFR BLD AUTO: 42.8 % (ref 40–54)
HDLC SERPL-MCNC: 43 MG/DL (ref 40–75)
HDLC SERPL: 27.9 % (ref 20–50)
HGB BLD-MCNC: 14.3 G/DL (ref 14–18)
IMM GRANULOCYTES # BLD AUTO: 0.02 K/UL (ref 0–0.04)
IMM GRANULOCYTES NFR BLD AUTO: 0.3 % (ref 0–0.5)
LDLC SERPL CALC-MCNC: 94.8 MG/DL (ref 63–159)
LYMPHOCYTES # BLD AUTO: 2.7 K/UL (ref 1–4.8)
LYMPHOCYTES NFR BLD: 35.9 % (ref 18–48)
MCH RBC QN AUTO: 29.9 PG (ref 27–31)
MCHC RBC AUTO-ENTMCNC: 33.4 G/DL (ref 32–36)
MCV RBC AUTO: 90 FL (ref 82–98)
MONOCYTES # BLD AUTO: 0.6 K/UL (ref 0.3–1)
MONOCYTES NFR BLD: 7.8 % (ref 4–15)
NEUTROPHILS # BLD AUTO: 4.1 K/UL (ref 1.8–7.7)
NEUTROPHILS NFR BLD: 53.8 % (ref 38–73)
NONHDLC SERPL-MCNC: 111 MG/DL
NRBC BLD-RTO: 0 /100 WBC
PLATELET # BLD AUTO: 236 K/UL (ref 150–450)
PMV BLD AUTO: 12.1 FL (ref 9.2–12.9)
POTASSIUM SERPL-SCNC: 4 MMOL/L (ref 3.5–5.1)
PROT SERPL-MCNC: 7.2 G/DL (ref 6–8.4)
RBC # BLD AUTO: 4.78 M/UL (ref 4.6–6.2)
SODIUM SERPL-SCNC: 140 MMOL/L (ref 136–145)
TRIGL SERPL-MCNC: 81 MG/DL (ref 30–150)
WBC # BLD AUTO: 7.57 K/UL (ref 3.9–12.7)

## 2024-07-17 PROCEDURE — 36415 COLL VENOUS BLD VENIPUNCTURE: CPT | Mod: PO | Performed by: INTERNAL MEDICINE

## 2024-07-17 PROCEDURE — 80053 COMPREHEN METABOLIC PANEL: CPT | Performed by: INTERNAL MEDICINE

## 2024-07-17 PROCEDURE — 80061 LIPID PANEL: CPT | Performed by: INTERNAL MEDICINE

## 2024-07-17 PROCEDURE — 85025 COMPLETE CBC W/AUTO DIFF WBC: CPT | Performed by: INTERNAL MEDICINE

## 2024-07-17 PROCEDURE — 84153 ASSAY OF PSA TOTAL: CPT | Performed by: INTERNAL MEDICINE

## 2024-07-23 DIAGNOSIS — I10 ESSENTIAL HYPERTENSION: ICD-10-CM

## 2024-07-23 RX ORDER — LOSARTAN POTASSIUM 50 MG/1
50 TABLET ORAL DAILY
Qty: 90 TABLET | Refills: 3 | Status: SHIPPED | OUTPATIENT
Start: 2024-07-23

## 2024-07-23 NOTE — TELEPHONE ENCOUNTER
No care due was identified.  [facility] Embedded Care Due Messages. Reference number: [id number].   7/23/2024 3:23:55 PM CDT

## 2024-07-24 NOTE — TELEPHONE ENCOUNTER
Refill Decision Note   Lorenzo Cross  is requesting a refill authorization.    Brief Assessment and Rationale for Refill:   Approve       Medication Therapy Plan:         Comments:     Note composed:10:29 PM 07/23/2024

## 2024-08-04 DIAGNOSIS — R09.82 PND (POST-NASAL DRIP): ICD-10-CM

## 2024-08-04 DIAGNOSIS — M25.511 ACUTE PAIN OF RIGHT SHOULDER: ICD-10-CM

## 2024-08-05 RX ORDER — CELECOXIB 200 MG/1
200 CAPSULE ORAL DAILY PRN
Qty: 30 CAPSULE | Refills: 11 | Status: SHIPPED | OUTPATIENT
Start: 2024-08-05

## 2024-08-05 RX ORDER — FLUTICASONE PROPIONATE 50 MCG
2 SPRAY, SUSPENSION (ML) NASAL DAILY
Qty: 48 G | Refills: 3 | Status: SHIPPED | OUTPATIENT
Start: 2024-08-05

## 2024-08-21 ENCOUNTER — OFFICE VISIT (OUTPATIENT)
Dept: FAMILY MEDICINE | Facility: CLINIC | Age: 59
End: 2024-08-21
Payer: COMMERCIAL

## 2024-08-21 VITALS
TEMPERATURE: 98 F | WEIGHT: 219.56 LBS | DIASTOLIC BLOOD PRESSURE: 84 MMHG | BODY MASS INDEX: 30.74 KG/M2 | HEIGHT: 71 IN | OXYGEN SATURATION: 97 % | HEART RATE: 95 BPM | SYSTOLIC BLOOD PRESSURE: 118 MMHG

## 2024-08-21 DIAGNOSIS — J01.00 ACUTE MAXILLARY SINUSITIS, RECURRENCE NOT SPECIFIED: ICD-10-CM

## 2024-08-21 DIAGNOSIS — I10 ESSENTIAL HYPERTENSION: Primary | ICD-10-CM

## 2024-08-21 DIAGNOSIS — E66.9 OBESITY (BMI 30-39.9): ICD-10-CM

## 2024-08-21 PROCEDURE — 1159F MED LIST DOCD IN RCRD: CPT | Mod: CPTII,S$GLB,, | Performed by: INTERNAL MEDICINE

## 2024-08-21 PROCEDURE — 99999 PR PBB SHADOW E&M-EST. PATIENT-LVL IV: CPT | Mod: PBBFAC,,, | Performed by: INTERNAL MEDICINE

## 2024-08-21 PROCEDURE — 99214 OFFICE O/P EST MOD 30 MIN: CPT | Mod: S$GLB,,, | Performed by: INTERNAL MEDICINE

## 2024-08-21 PROCEDURE — 3074F SYST BP LT 130 MM HG: CPT | Mod: CPTII,S$GLB,, | Performed by: INTERNAL MEDICINE

## 2024-08-21 PROCEDURE — 4010F ACE/ARB THERAPY RXD/TAKEN: CPT | Mod: CPTII,S$GLB,, | Performed by: INTERNAL MEDICINE

## 2024-08-21 PROCEDURE — 3008F BODY MASS INDEX DOCD: CPT | Mod: CPTII,S$GLB,, | Performed by: INTERNAL MEDICINE

## 2024-08-21 PROCEDURE — 3079F DIAST BP 80-89 MM HG: CPT | Mod: CPTII,S$GLB,, | Performed by: INTERNAL MEDICINE

## 2024-08-21 RX ORDER — PHENTERMINE HYDROCHLORIDE 37.5 MG/1
37.5 TABLET ORAL
Qty: 30 TABLET | Refills: 0 | Status: SHIPPED | OUTPATIENT
Start: 2024-08-21

## 2024-08-21 RX ORDER — CETIRIZINE HYDROCHLORIDE 10 MG/1
10 TABLET ORAL
COMMUNITY
Start: 2024-08-19

## 2024-08-21 RX ORDER — AMOXICILLIN AND CLAVULANATE POTASSIUM 875; 125 MG/1; MG/1
1 TABLET, FILM COATED ORAL EVERY 12 HOURS
COMMUNITY
Start: 2024-08-19

## 2024-08-21 RX ORDER — METHYLPREDNISOLONE 4 MG/1
TABLET ORAL
COMMUNITY
Start: 2024-08-19

## 2024-08-21 NOTE — PROGRESS NOTES
Subjective:       Patient ID: Lorenzo Cross is a 59 y.o. male.  Chief Complaint: Weight Loss     HPI    Saw Dr Acosta: CT calcium score 50.  Did not feel had hole in septum heart.  MRI not CVA.     Obesity - lost 12 lb on Adipex.       Complains of back tension.  Bends at work.       DVT - on 325 ASA; off Eliquis. Dr Acosta      HTN - controlled. His cuff runs sbp 10 higher.       PND - controlled with Flonase and OTC     LUIS - trying to lose weight     Assessment:       1. Essential hypertension    2. Acute maxillary sinusitis, recurrence not specified    3. Obesity (BMI 30-39.9)        Plan:       Essential hypertension    Acute maxillary sinusitis, recurrence not specified    Obesity (BMI 30-39.9)  -     phentermine (ADIPEX-P) 37.5 mg tablet; Take 1 tablet (37.5 mg total) by mouth before breakfast.  Dispense: 30 tablet; Refill: 0          2/3  Continue current management and monitor.  Other diagnoses were reviewed and found stable and will continue to monitor.  Counseled on regular exercise, maintenance of a healthy weight, balanced diet rich in fruits/vegetables and lean protein, and avoidance of unhealthy habits like smoking and excessive alcohol intake.   Also, counseled on importance of being compliant with medication, health appointments, diet and exercise.     Follow up in about 4 weeks (around 9/18/2024).      Medication List with Changes/Refills   Current Medications    AMOXICILLIN-CLAVULANATE 875-125MG (AUGMENTIN) 875-125 MG PER TABLET    Take 1 tablet by mouth every 12 (twelve) hours.    CELECOXIB (CELEBREX) 200 MG CAPSULE    Take 1 capsule (200 mg total) by mouth daily as needed.    CETIRIZINE (ZYRTEC) 10 MG TABLET    Take 10 mg by mouth.    DIPHTH,PERTUS,ACELL,,TETANUS (BOOSTRIX TDAP) 2.5-8-5 LF-MCG-LF/0.5ML SUSP    Inject into the muscle.    FLUTICASONE PROPIONATE (FLONASE) 50 MCG/ACTUATION NASAL SPRAY    2 sprays (100 mcg total) by Each Nostril route once daily.    LOSARTAN (COZAAR) 50 MG  "TABLET    Take 1 tablet (50 mg total) by mouth once daily.    METHYLPREDNISOLONE (MEDROL DOSEPACK) 4 MG TABLET        TADALAFIL (CIALIS) 20 MG TAB    Take one tablet by mouth every 36 hours as needed.   Changed and/or Refilled Medications    Modified Medication Previous Medication    PHENTERMINE (ADIPEX-P) 37.5 MG TABLET phentermine (ADIPEX-P) 37.5 mg tablet       Take 1 tablet (37.5 mg total) by mouth before breakfast.    Take 1 tablet (37.5 mg total) by mouth before breakfast.       BP Readings from Last 3 Encounters:   08/21/24 118/84   07/16/24 126/74   02/06/24 122/73     No results found for: "HGBA1C"  No results found for: "TSH"  Lab Results   Component Value Date    LDLCALC 94.8 07/17/2024    LDLCALC 75.4 09/16/2020    LDLCALC 83.4 08/23/2019     Lab Results   Component Value Date    TRIG 81 07/17/2024    TRIG 143 09/16/2020    TRIG 108 08/23/2019     Wt Readings from Last 3 Encounters:   08/21/24 99.6 kg (219 lb 9.3 oz)   07/16/24 105.1 kg (231 lb 11.3 oz)   12/12/23 108.4 kg (239 lb 1.4 oz)     Lab Results   Component Value Date    HGB 14.3 07/17/2024    HCT 42.8 07/17/2024    WBC 7.57 07/17/2024    ALT 25 07/17/2024    AST 24 07/17/2024     07/17/2024    K 4.0 07/17/2024    CREATININE 0.8 07/17/2024    PSA 2.2 07/17/2024           Review of Systems        Objective:      Vitals:    08/21/24 1459   BP: 118/84   Pulse: 95   Temp: 97.7 °F (36.5 °C)     Physical Exam              "

## 2024-09-25 ENCOUNTER — OFFICE VISIT (OUTPATIENT)
Dept: FAMILY MEDICINE | Facility: CLINIC | Age: 59
End: 2024-09-25
Payer: COMMERCIAL

## 2024-09-25 VITALS
OXYGEN SATURATION: 97 % | HEIGHT: 71 IN | BODY MASS INDEX: 30.47 KG/M2 | WEIGHT: 217.63 LBS | DIASTOLIC BLOOD PRESSURE: 86 MMHG | SYSTOLIC BLOOD PRESSURE: 124 MMHG | HEART RATE: 91 BPM

## 2024-09-25 DIAGNOSIS — K75.81 NASH (NONALCOHOLIC STEATOHEPATITIS): ICD-10-CM

## 2024-09-25 DIAGNOSIS — E66.9 OBESITY (BMI 30-39.9): ICD-10-CM

## 2024-09-25 DIAGNOSIS — I10 ESSENTIAL HYPERTENSION: Primary | ICD-10-CM

## 2024-09-25 PROCEDURE — 3079F DIAST BP 80-89 MM HG: CPT | Mod: CPTII,S$GLB,, | Performed by: INTERNAL MEDICINE

## 2024-09-25 PROCEDURE — 99214 OFFICE O/P EST MOD 30 MIN: CPT | Mod: S$GLB,,, | Performed by: INTERNAL MEDICINE

## 2024-09-25 PROCEDURE — 3074F SYST BP LT 130 MM HG: CPT | Mod: CPTII,S$GLB,, | Performed by: INTERNAL MEDICINE

## 2024-09-25 PROCEDURE — 1160F RVW MEDS BY RX/DR IN RCRD: CPT | Mod: CPTII,S$GLB,, | Performed by: INTERNAL MEDICINE

## 2024-09-25 PROCEDURE — 3008F BODY MASS INDEX DOCD: CPT | Mod: CPTII,S$GLB,, | Performed by: INTERNAL MEDICINE

## 2024-09-25 PROCEDURE — 1159F MED LIST DOCD IN RCRD: CPT | Mod: CPTII,S$GLB,, | Performed by: INTERNAL MEDICINE

## 2024-09-25 PROCEDURE — 4010F ACE/ARB THERAPY RXD/TAKEN: CPT | Mod: CPTII,S$GLB,, | Performed by: INTERNAL MEDICINE

## 2024-09-25 PROCEDURE — 99999 PR PBB SHADOW E&M-EST. PATIENT-LVL III: CPT | Mod: PBBFAC,,, | Performed by: INTERNAL MEDICINE

## 2024-09-25 RX ORDER — METHOCARBAMOL 750 MG/1
TABLET, FILM COATED ORAL
COMMUNITY
Start: 2024-07-16

## 2024-09-25 RX ORDER — PHENTERMINE HYDROCHLORIDE 37.5 MG/1
37.5 TABLET ORAL
Qty: 30 TABLET | Refills: 0 | Status: SHIPPED | OUTPATIENT
Start: 2024-09-25

## 2024-09-26 DIAGNOSIS — N52.9 ERECTILE DYSFUNCTION, UNSPECIFIED ERECTILE DYSFUNCTION TYPE: ICD-10-CM

## 2024-09-26 RX ORDER — TADALAFIL 20 MG/1
TABLET ORAL
Qty: 30 TABLET | Refills: 11 | Status: SHIPPED | OUTPATIENT
Start: 2024-09-26

## 2024-09-26 NOTE — TELEPHONE ENCOUNTER
No care due was identified.  [facility] Embedded Care Due Messages. Reference number: [id number].   9/26/2024 6:55:39 AM CDT

## 2024-09-27 NOTE — TELEPHONE ENCOUNTER
Refill Decision Note   Lorenzo Cross  is requesting a refill authorization.  Brief Assessment and Rationale for Refill:  Approve     Medication Therapy Plan:        Comments:     Note composed:8:14 PM 09/26/2024

## 2025-01-17 NOTE — TELEPHONE ENCOUNTER
----- Message from Taryn Paris MA sent at 6/27/2022  9:34 AM CDT -----  Type: Needs Medical Advice  Who Called:  pt    Best Call Back Number: [phone] (home)     Additional Information: pt would like to know if his appt at 130p today can be pushed back about 45 min? Please advise--thank you        
Patient was seen at apt time  
No assistance needed

## 2025-01-23 ENCOUNTER — OFFICE VISIT (OUTPATIENT)
Dept: FAMILY MEDICINE | Facility: CLINIC | Age: 60
End: 2025-01-23
Payer: COMMERCIAL

## 2025-01-23 VITALS
BODY MASS INDEX: 31.97 KG/M2 | HEIGHT: 71 IN | DIASTOLIC BLOOD PRESSURE: 84 MMHG | HEART RATE: 90 BPM | WEIGHT: 228.38 LBS | SYSTOLIC BLOOD PRESSURE: 132 MMHG | TEMPERATURE: 98 F | OXYGEN SATURATION: 97 %

## 2025-01-23 DIAGNOSIS — J10.1 INFLUENZA A: Primary | ICD-10-CM

## 2025-01-23 DIAGNOSIS — E66.9 OBESITY (BMI 30-39.9): ICD-10-CM

## 2025-01-23 DIAGNOSIS — I10 ESSENTIAL HYPERTENSION: ICD-10-CM

## 2025-01-23 DIAGNOSIS — M54.16 LUMBAR RADICULOPATHY: ICD-10-CM

## 2025-01-23 LAB
CTP QC/QA: YES
CTP QC/QA: YES
POC MOLECULAR INFLUENZA A AGN: POSITIVE
POC MOLECULAR INFLUENZA B AGN: NEGATIVE
SARS-COV-2 RDRP RESP QL NAA+PROBE: NEGATIVE

## 2025-01-23 PROCEDURE — 87502 INFLUENZA DNA AMP PROBE: CPT | Mod: QW,S$GLB,, | Performed by: INTERNAL MEDICINE

## 2025-01-23 PROCEDURE — 99999 PR PBB SHADOW E&M-EST. PATIENT-LVL V: CPT | Mod: PBBFAC,,, | Performed by: INTERNAL MEDICINE

## 2025-01-23 PROCEDURE — 3079F DIAST BP 80-89 MM HG: CPT | Mod: CPTII,S$GLB,, | Performed by: INTERNAL MEDICINE

## 2025-01-23 PROCEDURE — 3075F SYST BP GE 130 - 139MM HG: CPT | Mod: CPTII,S$GLB,, | Performed by: INTERNAL MEDICINE

## 2025-01-23 PROCEDURE — 1160F RVW MEDS BY RX/DR IN RCRD: CPT | Mod: CPTII,S$GLB,, | Performed by: INTERNAL MEDICINE

## 2025-01-23 PROCEDURE — G2211 COMPLEX E/M VISIT ADD ON: HCPCS | Mod: S$GLB,,, | Performed by: INTERNAL MEDICINE

## 2025-01-23 PROCEDURE — 1159F MED LIST DOCD IN RCRD: CPT | Mod: CPTII,S$GLB,, | Performed by: INTERNAL MEDICINE

## 2025-01-23 PROCEDURE — 3008F BODY MASS INDEX DOCD: CPT | Mod: CPTII,S$GLB,, | Performed by: INTERNAL MEDICINE

## 2025-01-23 PROCEDURE — 87635 SARS-COV-2 COVID-19 AMP PRB: CPT | Mod: QW,S$GLB,, | Performed by: INTERNAL MEDICINE

## 2025-01-23 PROCEDURE — 99214 OFFICE O/P EST MOD 30 MIN: CPT | Mod: S$GLB,,, | Performed by: INTERNAL MEDICINE

## 2025-01-23 RX ORDER — BALOXAVIR MARBOXIL 80 MG/1
80 TABLET, FILM COATED ORAL ONCE
Qty: 1 TABLET | Refills: 0 | Status: SHIPPED | OUTPATIENT
Start: 2025-01-23 | End: 2025-01-23

## 2025-01-23 RX ORDER — BENZONATATE 100 MG/1
CAPSULE ORAL
Qty: 45 CAPSULE | Refills: 0 | Status: SHIPPED | OUTPATIENT
Start: 2025-01-23

## 2025-01-23 RX ORDER — PHENTERMINE HYDROCHLORIDE 37.5 MG/1
37.5 TABLET ORAL
Qty: 30 TABLET | Refills: 0 | Status: SHIPPED | OUTPATIENT
Start: 2025-01-23 | End: 2025-02-22

## 2025-01-23 RX ORDER — CODEINE PHOSPHATE AND GUAIFENESIN 10; 100 MG/5ML; MG/5ML
5 SOLUTION ORAL EVERY 6 HOURS PRN
Qty: 180 ML | Refills: 0 | Status: SHIPPED | OUTPATIENT
Start: 2025-01-23 | End: 2025-02-02

## 2025-01-23 RX ORDER — ASPIRIN 325 MG
325 TABLET ORAL DAILY
COMMUNITY

## 2025-01-23 NOTE — PROGRESS NOTES
Subjective:       Patient ID: Lorenzo Cross is a 59 y.o. male.  Chief Complaint: Cough, Headache, Sore Throat, Sinus Problem, and Fatigue     HPI    C/o sore throat, cough and feeling run down for 2 days.  Grandson stayed with him this past weekend and tested + for Flu.    Saw Dr Acosta: CT calcium score 50.  Did not feel had hole in septum heart.  MRI not CVA.     Obesity - would like help       Complains of back tension.  Bends at work.       DVT - on 325 ASA; off Eliquis. Dr Acosta      HTN - controlled. His cuff runs sbp 10 higher.       PND - controlled with Flonase and OTC     LUIS - lost 2 lb on Adipex.      Assessment:       1. Influenza A    2. Essential hypertension    3. Obesity (BMI 30-39.9)    4. Lumbar radiculopathy        Plan:       Influenza A  -     baloxavir marboxiL (XOFLUZA) 80 mg tablet; Take 1 tablet (80 mg total) by mouth once. for 1 dose  Dispense: 1 tablet; Refill: 0  -     POCT COVID-19 Rapid Screening  -     POCT Influenza A/B Molecular  -     benzonatate (TESSALON) 100 MG capsule; 1 - 2 po every 6 hours prn cough  Dispense: 45 capsule; Refill: 0  -     guaiFENesin-codeine 100-10 mg/5 ml (GUAIFENESIN AC)  mg/5 mL syrup; Take 5 mLs by mouth every 6 (six) hours as needed for Cough.  Dispense: 180 mL; Refill: 0    Essential hypertension    Obesity (BMI 30-39.9)  -     phentermine (ADIPEX-P) 37.5 mg tablet; Take 1 tablet (37.5 mg total) by mouth before breakfast.  Dispense: 30 tablet; Refill: 0    Lumbar radiculopathy  -     Ambulatory referral/consult to Physical/Occupational Therapy; Future; Expected date: 01/30/2025          1/3  Visit today included increased complexity associated with the care of the episodic problem HTN addressed and managing the longitudinal care of the patient due to the serious and/or complex managed problem(s) HTN.  Continue current management and monitor.  Other diagnoses were reviewed and found stable and will continue to monitor.  Counseled on regular  exercise, maintenance of a healthy weight, balanced diet rich in fruits/vegetables and lean protein, and avoidance of unhealthy habits like smoking and excessive alcohol intake.   Also, counseled on importance of being compliant with medication, health appointments, diet and exercise.     Follow up in about 1 month (around 2/24/2025).      Medication List with Changes/Refills   New Medications    BALOXAVIR MARBOXIL (XOFLUZA) 80 MG TABLET    Take 1 tablet (80 mg total) by mouth once. for 1 dose    BENZONATATE (TESSALON) 100 MG CAPSULE    1 - 2 po every 6 hours prn cough    GUAIFENESIN-CODEINE 100-10 MG/5 ML (GUAIFENESIN AC)  MG/5 ML SYRUP    Take 5 mLs by mouth every 6 (six) hours as needed for Cough.    PHENTERMINE (ADIPEX-P) 37.5 MG TABLET    Take 1 tablet (37.5 mg total) by mouth before breakfast.   Current Medications    AMOXICILLIN-CLAVULANATE 875-125MG (AUGMENTIN) 875-125 MG PER TABLET    Take 1 tablet by mouth every 12 (twelve) hours.    ASPIRIN 325 MG TABLET    Take 325 mg by mouth once daily.    CELECOXIB (CELEBREX) 200 MG CAPSULE    Take 1 capsule (200 mg total) by mouth daily as needed.    CETIRIZINE (ZYRTEC) 10 MG TABLET    Take 10 mg by mouth.    DIPHTH,PERTUS,ACELL,,TETANUS (BOOSTRIX TDAP) 2.5-8-5 LF-MCG-LF/0.5ML SUSP    Inject into the muscle.    FLUTICASONE PROPIONATE (FLONASE) 50 MCG/ACTUATION NASAL SPRAY    2 sprays (100 mcg total) by Each Nostril route once daily.    LOSARTAN (COZAAR) 50 MG TABLET    Take 1 tablet (50 mg total) by mouth once daily.    METHOCARBAMOL (ROBAXIN) 750 MG TAB    TAKE ONE TABLET BY MOUTH FOUR TIMES DAILY AS NEEDED FOR BACK TENSION    METHYLPREDNISOLONE (MEDROL DOSEPACK) 4 MG TABLET        PHENTERMINE (ADIPEX-P) 37.5 MG TABLET    Take 1 tablet (37.5 mg total) by mouth before breakfast.    TADALAFIL (CIALIS) 20 MG TAB    Take one tablet by mouth every 36 hours as needed.       BP Readings from Last 3 Encounters:   01/23/25 132/84   09/25/24 124/86   08/21/24 118/84  "    No results found for: "HGBA1C"  No results found for: "TSH"  Lab Results   Component Value Date    LDLCALC 94.8 07/17/2024    LDLCALC 75.4 09/16/2020    LDLCALC 83.4 08/23/2019     Lab Results   Component Value Date    TRIG 81 07/17/2024    TRIG 143 09/16/2020    TRIG 108 08/23/2019     Wt Readings from Last 3 Encounters:   01/23/25 103.6 kg (228 lb 6.3 oz)   09/25/24 98.7 kg (217 lb 9.5 oz)   08/21/24 99.6 kg (219 lb 9.3 oz)     Lab Results   Component Value Date    HGB 14.3 07/17/2024    HCT 42.8 07/17/2024    WBC 7.57 07/17/2024    ALT 25 07/17/2024    AST 24 07/17/2024     07/17/2024    K 4.0 07/17/2024    CREATININE 0.8 07/17/2024    PSA 2.2 07/17/2024           Review of Systems        Objective:      Vitals:    01/23/25 1651   BP: 132/84   Pulse: 90   Temp: 98 °F (36.7 °C)     Physical Exam  Vitals reviewed.   Constitutional:       Appearance: Normal appearance.   Eyes:      Conjunctiva/sclera: Conjunctivae normal.   Cardiovascular:      Rate and Rhythm: Normal rate.   Pulmonary:      Effort: Pulmonary effort is normal.      Breath sounds: Normal breath sounds.   Musculoskeletal:      Cervical back: Normal range of motion.      Comments: Normal ROM bilateral    Skin:     General: Skin is warm and dry.   Neurological:      Mental Status: He is alert.      Cranial Nerves: Cranial nerve deficit: grossly intact.   Psychiatric:      Comments: Alert and orientated                   "

## 2025-01-24 ENCOUNTER — TELEPHONE (OUTPATIENT)
Dept: FAMILY MEDICINE | Facility: CLINIC | Age: 60
End: 2025-01-24
Payer: COMMERCIAL

## 2025-01-24 NOTE — TELEPHONE ENCOUNTER
----- Message from Mylene sent at 1/24/2025  2:16 PM CST -----  Contact: self  Type: Needs Medical Advice  Who Called:  the patient     Best Call Back Number: [phone]  Additional Information: pt is asking nurse to please call him back

## 2025-01-24 NOTE — TELEPHONE ENCOUNTER
Spoke with patient to advise it is to late to take the flu med that was sent to the pharmacy    He will get with the pharmacy to see what he can take with his cold medicine that was called in     Advised if he gets SOB or feels worse over the weekend go to the  PVU     He will also call us on Monday if he needs anything

## 2025-01-24 NOTE — TELEPHONE ENCOUNTER
Pt states is there something else he could take due to medication was 193.00 and he wants to know maybe otc.    He feels like it fell to his chest

## 2025-01-24 NOTE — TELEPHONE ENCOUNTER
----- Message from Wale sent at 1/24/2025  3:14 PM CST -----  Type: Needs Medical Advice    Who Called:  Pt    Pharmacy name and phone #:    [id number] - [place] - [address]  [address]  [place] [address]  Phone: [phone] Fax: [phone]    Best Call Back Number: [phone]    Additional Information: Pt states that he was recently prescribed is too expensive and want alternative. Unsure of the name of it. Please call back to advise, Thanks!

## 2025-02-05 DIAGNOSIS — J10.1 INFLUENZA A: ICD-10-CM

## 2025-02-05 NOTE — TELEPHONE ENCOUNTER
----- Message from Tara sent at 2/5/2025  8:43 AM CST -----  Contact: self  Type:  Needs Medical Advice    Who Called: pt     Would the patient rather a call back or a response via [facility]? Call back     Best Call Back Number: [phone]      Additional Information: needs to speak with office   Please call back to advise. Thanks!

## 2025-02-05 NOTE — TELEPHONE ENCOUNTER
FYI    Pt states he started to get better but then he feels he is getting worse.     States he coughed up green yellow mucus. 2 days ago. Not coughing up any more     Now he is having nose bleeds.     He is not feeling well.     Did not get to take the flu meds cause they were out. So toke the other meds prescribed.    I did schedule a virtual visit for him to discuss with you tomorrow.

## 2025-02-06 ENCOUNTER — OFFICE VISIT (OUTPATIENT)
Dept: FAMILY MEDICINE | Facility: CLINIC | Age: 60
End: 2025-02-06
Payer: COMMERCIAL

## 2025-02-06 VITALS — DIASTOLIC BLOOD PRESSURE: 70 MMHG | SYSTOLIC BLOOD PRESSURE: 120 MMHG

## 2025-02-06 DIAGNOSIS — J10.1 INFLUENZA A: Primary | ICD-10-CM

## 2025-02-06 DIAGNOSIS — I10 ESSENTIAL HYPERTENSION: ICD-10-CM

## 2025-02-06 PROCEDURE — 1160F RVW MEDS BY RX/DR IN RCRD: CPT | Mod: CPTII,95,, | Performed by: INTERNAL MEDICINE

## 2025-02-06 PROCEDURE — 1159F MED LIST DOCD IN RCRD: CPT | Mod: CPTII,95,, | Performed by: INTERNAL MEDICINE

## 2025-02-06 PROCEDURE — 98006 SYNCH AUDIO-VIDEO EST MOD 30: CPT | Mod: 95,,, | Performed by: INTERNAL MEDICINE

## 2025-02-06 PROCEDURE — 3074F SYST BP LT 130 MM HG: CPT | Mod: CPTII,95,, | Performed by: INTERNAL MEDICINE

## 2025-02-06 PROCEDURE — G2211 COMPLEX E/M VISIT ADD ON: HCPCS | Mod: 95,,, | Performed by: INTERNAL MEDICINE

## 2025-02-06 PROCEDURE — 3078F DIAST BP <80 MM HG: CPT | Mod: CPTII,95,, | Performed by: INTERNAL MEDICINE

## 2025-02-06 RX ORDER — DOXYCYCLINE 100 MG/1
100 CAPSULE ORAL 2 TIMES DAILY
Qty: 20 CAPSULE | Refills: 0 | Status: SHIPPED | OUTPATIENT
Start: 2025-02-06 | End: 2025-02-16

## 2025-02-06 RX ORDER — CODEINE PHOSPHATE AND GUAIFENESIN 10; 100 MG/5ML; MG/5ML
5 SOLUTION ORAL EVERY 6 HOURS PRN
Qty: 180 ML | Refills: 0 | Status: SHIPPED | OUTPATIENT
Start: 2025-02-06 | End: 2025-02-06 | Stop reason: SDUPTHER

## 2025-02-06 RX ORDER — BENZONATATE 100 MG/1
CAPSULE ORAL
Qty: 45 CAPSULE | Refills: 0 | Status: SHIPPED | OUTPATIENT
Start: 2025-02-06

## 2025-02-06 NOTE — TELEPHONE ENCOUNTER
----- Message from Alejandra sent at 2/6/2025  1:59 PM CST -----  Contact: pt  Type:  RX Refill Request    Who Called: pt    Refill or New Rx: NEW    RX Name and Strength: guaiFENesin-codeine 100-10 mg/5 ml (GUAIFENESIN AC)  mg/5 mL syrup    How is the patient currently taking it? (ex. 1XDay): as directed    Is this a 30 day or 90 day RX: 30    Preferred Pharmacy with phone number:     [id number] - [place], [place] - [address]  [address] [place] [address]  Phone: [phone] Fax: [phone]    Local or Mail Order:local    Ordering Provider: Deepak    Would the patient rather a call back or a response via [facility]?  Call if questions    Best Call Back Number:[phone]    Additional Information:  does not use [facility].     Please resend to his local pharm in [place]    Thanks

## 2025-02-06 NOTE — PROGRESS NOTES
The patient location is: [place]  The chief complaint leading to consultation is: cough  Visit type: Virtual visit with synchronous audio and video  Total time spent with patient: 10 m  Each patient to whom he or she provides medical services by telemedicine is:  (1) informed of the relationship between the physician and patient and the respective role of any other health care provider with respect to management of the patient; and (2) notified that he or she may decline to receive medical services by telemedicine and may withdraw from such care at any time.    Notes:     Diagnosed with Flu 2 weeks ago.  No change in symptoms.  Now has tan-yellow sputum both nasal and cough.    Recall:   C/o sore throat, cough and feeling run down for 2 days.  Grandson stayed with him this past weekend and tested + for Flu.     Saw Dr Acosta: CT calcium score 50.  Did not feel had hole in septum heart.  MRI not CVA.     Obesity - would like help; has not started Adipex w sick     Complains of back tension.  Bends at work.       DVT - on 325 ASA; off Eliquis. Dr Acosta      HTN - controlled. His cuff runs sbp 10 higher.       PND - controlled with Flonase and OTC     LUIS - lost 2 lb on Adipex.       Review of Systems      Physical Exam      Assessment:       1. Influenza A    2. Essential hypertension        Plan:       Influenza A  -     doxycycline (VIBRAMYCIN) 100 MG Cap; Take 1 capsule (100 mg total) by mouth 2 (two) times daily. for 10 days  Dispense: 20 capsule; Refill: 0  -     benzonatate (TESSALON) 100 MG capsule; 1 - 2 po every 6 hours prn cough  Dispense: 45 capsule; Refill: 0  -     guaiFENesin-codeine 100-10 mg/5 ml (GUAIFENESIN AC)  mg/5 mL syrup; Take 5 mLs by mouth every 6 (six) hours as needed for Cough.  Dispense: 180 mL; Refill: 0    Essential hypertension          Visit today included increased complexity associated with the care of the episodic problem HTN addressed and managing the longitudinal care of the  patient due to the serious and/or complex managed problem(s) HTN.  Medication List with Changes/Refills   New Medications    BENZONATATE (TESSALON) 100 MG CAPSULE    1 - 2 po every 6 hours prn cough    DOXYCYCLINE (VIBRAMYCIN) 100 MG CAP    Take 1 capsule (100 mg total) by mouth 2 (two) times daily. for 10 days    GUAIFENESIN-CODEINE 100-10 MG/5 ML (GUAIFENESIN AC)  MG/5 ML SYRUP    Take 5 mLs by mouth every 6 (six) hours as needed for Cough.   Current Medications    AMOXICILLIN-CLAVULANATE 875-125MG (AUGMENTIN) 875-125 MG PER TABLET    Take 1 tablet by mouth every 12 (twelve) hours.    ASPIRIN 325 MG TABLET    Take 325 mg by mouth once daily.    BENZONATATE (TESSALON) 100 MG CAPSULE    1 - 2 po every 6 hours prn cough    CELECOXIB (CELEBREX) 200 MG CAPSULE    Take 1 capsule (200 mg total) by mouth daily as needed.    CETIRIZINE (ZYRTEC) 10 MG TABLET    Take 10 mg by mouth.    DIPHTH,PERTUS,ACELL,,TETANUS (BOOSTRIX TDAP) 2.5-8-5 LF-MCG-LF/0.5ML SUSP    Inject into the muscle.    FLUTICASONE PROPIONATE (FLONASE) 50 MCG/ACTUATION NASAL SPRAY    2 sprays (100 mcg total) by Each Nostril route once daily.    LOSARTAN (COZAAR) 50 MG TABLET    Take 1 tablet (50 mg total) by mouth once daily.    METHOCARBAMOL (ROBAXIN) 750 MG TAB    TAKE ONE TABLET BY MOUTH FOUR TIMES DAILY AS NEEDED FOR BACK TENSION    METHYLPREDNISOLONE (MEDROL DOSEPACK) 4 MG TABLET        PHENTERMINE (ADIPEX-P) 37.5 MG TABLET    Take 1 tablet (37.5 mg total) by mouth before breakfast.    PHENTERMINE (ADIPEX-P) 37.5 MG TABLET    Take 1 tablet (37.5 mg total) by mouth before breakfast.    TADALAFIL (CIALIS) 20 MG TAB    Take one tablet by mouth every 36 hours as needed.       Continue current management and monitor.    Counseled on regular exercise, maintenance of a healthy weight, balanced diet rich in fruits/vegetables and lean protein, and avoidance of unhealthy habits like smoking and excessive alcohol intake.   Also, counseled on importance of  being compliant with medication, health appointments, diet and exercise.     Follow up in about 5 weeks (around 3/10/2025).    Answers submitted by the patient for this visit:  Cough Questionnaire (Submitted on 2/5/2025)  Chief Complaint: Cough  Chronicity: recurrent  Onset: 1 to 4 weeks ago  Progression since onset: waxing and waning  Frequency: every few hours  Cough characteristics: productive of blood-tinged sputum  ear congestion: No  heartburn: No  hemoptysis: No  nasal congestion: Yes  sweats: No  weight loss: No  Aggravated by: lying down, stress  asthma: No  bronchiectasis: No  bronchitis: No  COPD: No  emphysema: No  pneumonia: No  Improvement on treatment: mild     Flat affect/lethargic/somnolent

## 2025-02-07 RX ORDER — CODEINE PHOSPHATE AND GUAIFENESIN 10; 100 MG/5ML; MG/5ML
5 SOLUTION ORAL EVERY 6 HOURS PRN
Qty: 180 ML | Refills: 0 | Status: SHIPPED | OUTPATIENT
Start: 2025-02-07 | End: 2025-02-17

## 2025-03-07 ENCOUNTER — TELEPHONE (OUTPATIENT)
Dept: FAMILY MEDICINE | Facility: CLINIC | Age: 60
End: 2025-03-07
Payer: COMMERCIAL

## 2025-03-07 NOTE — TELEPHONE ENCOUNTER
----- Message from Brenda sent at 3/7/2025 10:06 AM CST -----  Regarding: Needs return call  Type: Needs Medical AdviceWho Called:  Ruben Call Back Number: [phone] Information: Pt wants to see if theres anyway he can come in later time on Monday, please call to bernardo

## 2025-03-10 ENCOUNTER — TELEPHONE (OUTPATIENT)
Dept: FAMILY MEDICINE | Facility: CLINIC | Age: 60
End: 2025-03-10
Payer: COMMERCIAL

## 2025-03-10 NOTE — TELEPHONE ENCOUNTER
----- Message from Epic[employer] sent at 3/10/2025 12:45 PM CDT -----  Type:  Sooner Apoointment RequestCaller is requesting a sooner appointment.  Caller declined first available appointment listed below.  Caller will not accept being placed on the waitlist and is requesting a message be sent to doctor.Name of Caller:the patientWhen is the first available appointment?Symptoms:weight lossWould the patient rather a call back or a response via [facility]? call back/[facility] Call Back Number:[phone] Additional Information: R/s 3/10 apptTHippocampus Learning Centresks

## 2025-03-26 ENCOUNTER — OFFICE VISIT (OUTPATIENT)
Dept: FAMILY MEDICINE | Facility: CLINIC | Age: 60
End: 2025-03-26
Payer: COMMERCIAL

## 2025-03-26 VITALS
HEART RATE: 86 BPM | HEIGHT: 71 IN | OXYGEN SATURATION: 97 % | TEMPERATURE: 98 F | DIASTOLIC BLOOD PRESSURE: 82 MMHG | SYSTOLIC BLOOD PRESSURE: 128 MMHG | BODY MASS INDEX: 30.65 KG/M2 | WEIGHT: 218.94 LBS

## 2025-03-26 DIAGNOSIS — I10 ESSENTIAL HYPERTENSION: Primary | ICD-10-CM

## 2025-03-26 DIAGNOSIS — E66.9 OBESITY (BMI 30-39.9): ICD-10-CM

## 2025-03-26 PROCEDURE — 3074F SYST BP LT 130 MM HG: CPT | Mod: CPTII,S$GLB,, | Performed by: INTERNAL MEDICINE

## 2025-03-26 PROCEDURE — 99999 PR PBB SHADOW E&M-EST. PATIENT-LVL IV: CPT | Mod: PBBFAC,,, | Performed by: INTERNAL MEDICINE

## 2025-03-26 PROCEDURE — 1159F MED LIST DOCD IN RCRD: CPT | Mod: CPTII,S$GLB,, | Performed by: INTERNAL MEDICINE

## 2025-03-26 PROCEDURE — 1160F RVW MEDS BY RX/DR IN RCRD: CPT | Mod: CPTII,S$GLB,, | Performed by: INTERNAL MEDICINE

## 2025-03-26 PROCEDURE — 3079F DIAST BP 80-89 MM HG: CPT | Mod: CPTII,S$GLB,, | Performed by: INTERNAL MEDICINE

## 2025-03-26 PROCEDURE — 3008F BODY MASS INDEX DOCD: CPT | Mod: CPTII,S$GLB,, | Performed by: INTERNAL MEDICINE

## 2025-03-26 PROCEDURE — 99214 OFFICE O/P EST MOD 30 MIN: CPT | Mod: S$GLB,,, | Performed by: INTERNAL MEDICINE

## 2025-03-26 PROCEDURE — G2211 COMPLEX E/M VISIT ADD ON: HCPCS | Mod: S$GLB,,, | Performed by: INTERNAL MEDICINE

## 2025-03-26 RX ORDER — PHENTERMINE HYDROCHLORIDE 37.5 MG/1
37.5 TABLET ORAL
Qty: 30 TABLET | Refills: 0 | Status: SHIPPED | OUTPATIENT
Start: 2025-03-26

## 2025-03-26 NOTE — PROGRESS NOTES
Subjective:       Patient ID: Lorenzo Cross is a 59 y.o. male.  Chief Complaint: Follow-up (Weight loss) and Hypertension     HPI    Saw Dr Acosta: CT calcium score 50.  Did not feel had hole in septum heart.  MRI not CVA.     Obesity - lost 10 lb w Adipex and good ls changes.  218     Complains of back tension.  Bends at work.       DVT - on 325 ASA; off Eliquis. Dr Acosta      HTN - controlled. His cuff runs sbp 10 higher.       PND - controlled with Flonase and OTC     LUIS - lost 2 lb on Adipex.       Some back and shoulder pain related to grilling and leaning.  Improves with massage    Assessment:       1. Essential hypertension    2. Obesity (BMI 30-39.9)        Plan:       Essential hypertension    Obesity (BMI 30-39.9)  -     phentermine (ADIPEX-P) 37.5 mg tablet; Take 1 tablet (37.5 mg total) by mouth before breakfast.  Dispense: 30 tablet; Refill: 0          2/3    Visit today included increased complexity associated with the care of the episodic problem HTN addressed and managing the longitudinal care of the patient due to the serious and/or complex managed problem(s) HTN.  Continue current management and monitor.  Other diagnoses were reviewed and found stable and will continue to monitor.  Counseled on regular exercise, maintenance of a healthy weight, balanced diet rich in fruits/vegetables and lean protein, and avoidance of unhealthy habits like smoking and excessive alcohol intake.   Also, counseled on importance of being compliant with medication, health appointments, diet and exercise.     Follow up in about 29 days (around 4/24/2025).      Medication List with Changes/Refills   Current Medications    AMOXICILLIN-CLAVULANATE 875-125MG (AUGMENTIN) 875-125 MG PER TABLET    Take 1 tablet by mouth every 12 (twelve) hours.    ASPIRIN 325 MG TABLET    Take 325 mg by mouth once daily.    BENZONATATE (TESSALON) 100 MG CAPSULE    1 - 2 po every 6 hours prn cough    BENZONATATE (TESSALON) 100 MG CAPSULE  "   Take 1 to 2 capsules by mouth every 6 hours as needed for cough    CELECOXIB (CELEBREX) 200 MG CAPSULE    Take 1 capsule (200 mg total) by mouth daily as needed.    CETIRIZINE (ZYRTEC) 10 MG TABLET    Take 10 mg by mouth.    DIPHTH,PERTUS,ACELL,,TETANUS (BOOSTRIX TDAP) 2.5-8-5 LF-MCG-LF/0.5ML SUSP    Inject into the muscle.    FLUTICASONE PROPIONATE (FLONASE) 50 MCG/ACTUATION NASAL SPRAY    2 sprays (100 mcg total) by Each Nostril route once daily.    LOSARTAN (COZAAR) 50 MG TABLET    Take 1 tablet (50 mg total) by mouth once daily.    METHOCARBAMOL (ROBAXIN) 750 MG TAB        METHYLPREDNISOLONE (MEDROL DOSEPACK) 4 MG TABLET        TADALAFIL (CIALIS) 20 MG TAB    Take one tablet by mouth every 36 hours as needed.   Changed and/or Refilled Medications    Modified Medication Previous Medication    PHENTERMINE (ADIPEX-P) 37.5 MG TABLET phentermine (ADIPEX-P) 37.5 mg tablet       Take 1 tablet (37.5 mg total) by mouth before breakfast.    Take 1 tablet (37.5 mg total) by mouth before breakfast.       BP Readings from Last 3 Encounters:   03/26/25 128/82   02/06/25 120/70   01/23/25 132/84     No results found for: "HGBA1C"  No results found for: "TSH"  Lab Results   Component Value Date    LDLCALC 94.8 07/17/2024    LDLCALC 75.4 09/16/2020    LDLCALC 83.4 08/23/2019     Lab Results   Component Value Date    TRIG 81 07/17/2024    TRIG 143 09/16/2020    TRIG 108 08/23/2019     Wt Readings from Last 3 Encounters:   03/26/25 99.3 kg (218 lb 14.7 oz)   01/23/25 103.6 kg (228 lb 6.3 oz)   09/25/24 98.7 kg (217 lb 9.5 oz)     Lab Results   Component Value Date    HGB 14.3 07/17/2024    HCT 42.8 07/17/2024    WBC 7.57 07/17/2024    ALT 25 07/17/2024    AST 24 07/17/2024     07/17/2024    K 4.0 07/17/2024    CREATININE 0.8 07/17/2024    PSA 2.2 07/17/2024           Review of Systems        Objective:      Vitals:    03/26/25 1455   BP: 128/82   Pulse: 86   Temp: 97.8 °F (36.6 °C)     Physical Exam  Vitals reviewed. "   Constitutional:       Appearance: Normal appearance.   Eyes:      Conjunctiva/sclera: Conjunctivae normal.   Cardiovascular:      Rate and Rhythm: Normal rate.   Pulmonary:      Effort: Pulmonary effort is normal.      Breath sounds: Normal breath sounds.   Musculoskeletal:      Cervical back: Normal range of motion.      Comments: Normal ROM bilateral    Skin:     General: Skin is warm and dry.   Neurological:      Mental Status: He is alert.      Cranial Nerves: Cranial nerve deficit: grossly intact.   Psychiatric:      Comments: Alert and orientated

## 2025-05-12 ENCOUNTER — OFFICE VISIT (OUTPATIENT)
Dept: FAMILY MEDICINE | Facility: CLINIC | Age: 60
End: 2025-05-12
Payer: COMMERCIAL

## 2025-05-12 VITALS
WEIGHT: 213.88 LBS | DIASTOLIC BLOOD PRESSURE: 82 MMHG | SYSTOLIC BLOOD PRESSURE: 126 MMHG | TEMPERATURE: 98 F | OXYGEN SATURATION: 97 % | HEIGHT: 71 IN | BODY MASS INDEX: 29.94 KG/M2 | HEART RATE: 84 BPM

## 2025-05-12 DIAGNOSIS — I10 ESSENTIAL HYPERTENSION: Primary | ICD-10-CM

## 2025-05-12 DIAGNOSIS — E66.9 OBESITY (BMI 30-39.9): ICD-10-CM

## 2025-05-12 DIAGNOSIS — Z00.00 ROUTINE PHYSICAL EXAMINATION: ICD-10-CM

## 2025-05-12 PROCEDURE — 1159F MED LIST DOCD IN RCRD: CPT | Mod: CPTII,S$GLB,, | Performed by: INTERNAL MEDICINE

## 2025-05-12 PROCEDURE — 99214 OFFICE O/P EST MOD 30 MIN: CPT | Mod: S$GLB,,, | Performed by: INTERNAL MEDICINE

## 2025-05-12 PROCEDURE — G2211 COMPLEX E/M VISIT ADD ON: HCPCS | Mod: S$GLB,,, | Performed by: INTERNAL MEDICINE

## 2025-05-12 PROCEDURE — 99999 PR PBB SHADOW E&M-EST. PATIENT-LVL II: CPT | Mod: PBBFAC,,, | Performed by: INTERNAL MEDICINE

## 2025-05-12 PROCEDURE — 1160F RVW MEDS BY RX/DR IN RCRD: CPT | Mod: CPTII,S$GLB,, | Performed by: INTERNAL MEDICINE

## 2025-05-12 RX ORDER — PHENTERMINE HYDROCHLORIDE 37.5 MG/1
37.5 TABLET ORAL
Qty: 30 TABLET | Refills: 0 | Status: SHIPPED | OUTPATIENT
Start: 2025-05-12

## 2025-05-12 NOTE — PROGRESS NOTES
Subjective:       Patient ID: Lorenzo Cross is a 60 y.o. male.  Chief Complaint: No chief complaint on file.     HPI    Saw Dr Acosta: CT calcium score 50.  Did not feel had hole in septum heart.  MRI not CVA.     Obesity - lost 4 lb w Adipex and good ls changes.  217     DVT - on 325 ASA; off Eliquis. Dr Acosta      HTN - controlled. His cuff runs sbp 10 higher.       PND - controlled with Flonase and OTC     LUIS - lost 2 lb on Adipex.        Some back and shoulder pain related to grilling and leaning.  Improves with massage          Assessment:       1. Essential hypertension    2. Obesity (BMI 30-39.9)    3. Routine physical examination        Plan:       Essential hypertension    Obesity (BMI 30-39.9)  -     phentermine (ADIPEX-P) 37.5 mg tablet; Take 1 tablet (37.5 mg total) by mouth before breakfast.  Dispense: 30 tablet; Refill: 0    Routine physical examination  -     CBC Auto Differential; Future; Expected date: 09/09/2025  -     Comprehensive Metabolic Panel; Future; Expected date: 09/09/2025  -     Hemoglobin A1C; Future; Expected date: 09/09/2025  -     Lipid Panel; Future; Expected date: 09/09/2025  -     PSA, Screening; Future; Expected date: 09/09/2025              3/3    Visit today included increased complexity associated with the care of the episodic problem HTN addressed and managing the longitudinal care of the patient due to the serious and/or complex managed problem(s) HTN.  Continue current management and monitor.  Other diagnoses were reviewed and found stable and will continue to monitor.  Counseled on regular exercise, maintenance of a healthy weight, balanced diet rich in fruits/vegetables and lean protein, and avoidance of unhealthy habits like smoking and excessive alcohol intake.   Also, counseled on importance of being compliant with medication, health appointments, diet and exercise.     Follow up in about 4 months (around 9/12/2025). Annual       Medication List with  "Changes/Refills   Current Medications    AMOXICILLIN-CLAVULANATE 875-125MG (AUGMENTIN) 875-125 MG PER TABLET    Take 1 tablet by mouth every 12 (twelve) hours.    ASPIRIN 325 MG TABLET    Take 325 mg by mouth once daily.    BENZONATATE (TESSALON) 100 MG CAPSULE    1 - 2 po every 6 hours prn cough    BENZONATATE (TESSALON) 100 MG CAPSULE    Take 1 to 2 capsules by mouth every 6 hours as needed for cough    CELECOXIB (CELEBREX) 200 MG CAPSULE    Take 1 capsule (200 mg total) by mouth daily as needed.    CETIRIZINE (ZYRTEC) 10 MG TABLET    Take 10 mg by mouth.    DIPHTH,PERTUS,ACELL,,TETANUS (BOOSTRIX TDAP) 2.5-8-5 LF-MCG-LF/0.5ML SUSP    Inject into the muscle.    FLUTICASONE PROPIONATE (FLONASE) 50 MCG/ACTUATION NASAL SPRAY    2 sprays (100 mcg total) by Each Nostril route once daily.    LOSARTAN (COZAAR) 50 MG TABLET    Take 1 tablet (50 mg total) by mouth once daily.    METHOCARBAMOL (ROBAXIN) 750 MG TAB        METHYLPREDNISOLONE (MEDROL DOSEPACK) 4 MG TABLET        PHENTERMINE (ADIPEX-P) 37.5 MG TABLET    Take 1 tablet (37.5 mg total) by mouth before breakfast.    TADALAFIL (CIALIS) 20 MG TAB    Take one tablet by mouth every 36 hours as needed.       BP Readings from Last 3 Encounters:   03/26/25 128/82   02/06/25 120/70   01/23/25 132/84     No results found for: "HGBA1C"  No results found for: "TSH"  Lab Results   Component Value Date    LDLCALC 94.8 07/17/2024    LDLCALC 75.4 09/16/2020    LDLCALC 83.4 08/23/2019     Lab Results   Component Value Date    TRIG 81 07/17/2024    TRIG 143 09/16/2020    TRIG 108 08/23/2019     Wt Readings from Last 3 Encounters:   03/26/25 99.3 kg (218 lb 14.7 oz)   01/23/25 103.6 kg (228 lb 6.3 oz)   09/25/24 98.7 kg (217 lb 9.5 oz)     Lab Results   Component Value Date    HGB 14.3 07/17/2024    HCT 42.8 07/17/2024    WBC 7.57 07/17/2024    ALT 25 07/17/2024    AST 24 07/17/2024     07/17/2024    K 4.0 07/17/2024    CREATININE 0.8 07/17/2024    PSA 2.2 07/17/2024 "           Review of Systems        Objective:      There were no vitals filed for this visit.  Physical Exam